# Patient Record
Sex: FEMALE | Race: BLACK OR AFRICAN AMERICAN | NOT HISPANIC OR LATINO | URBAN - METROPOLITAN AREA
[De-identification: names, ages, dates, MRNs, and addresses within clinical notes are randomized per-mention and may not be internally consistent; named-entity substitution may affect disease eponyms.]

---

## 2017-11-10 ENCOUNTER — EMERGENCY (EMERGENCY)
Facility: HOSPITAL | Age: 32
LOS: 1 days | Discharge: HOME | End: 2017-11-10
Attending: EMERGENCY MEDICINE
Payer: COMMERCIAL

## 2017-11-10 VITALS
DIASTOLIC BLOOD PRESSURE: 66 MMHG | TEMPERATURE: 98 F | OXYGEN SATURATION: 98 % | RESPIRATION RATE: 18 BRPM | HEART RATE: 72 BPM | SYSTOLIC BLOOD PRESSURE: 128 MMHG

## 2017-11-10 VITALS
OXYGEN SATURATION: 100 % | TEMPERATURE: 98 F | SYSTOLIC BLOOD PRESSURE: 146 MMHG | RESPIRATION RATE: 19 BRPM | DIASTOLIC BLOOD PRESSURE: 81 MMHG | HEART RATE: 89 BPM

## 2017-11-10 LAB
ALBUMIN SERPL ELPH-MCNC: 3.7 G/DL — SIGNIFICANT CHANGE UP (ref 3.5–5)
ALP SERPL-CCNC: 89 U/L — SIGNIFICANT CHANGE UP (ref 40–120)
ALT FLD-CCNC: 27 U/L DA — SIGNIFICANT CHANGE UP (ref 10–60)
ANION GAP SERPL CALC-SCNC: 7 MMOL/L — SIGNIFICANT CHANGE UP (ref 5–17)
AST SERPL-CCNC: 18 U/L — SIGNIFICANT CHANGE UP (ref 10–40)
BASOPHILS # BLD AUTO: 0.2 K/UL — SIGNIFICANT CHANGE UP (ref 0–0.2)
BASOPHILS NFR BLD AUTO: 2.2 % — HIGH (ref 0–2)
BILIRUB SERPL-MCNC: 0.2 MG/DL — SIGNIFICANT CHANGE UP (ref 0.2–1.2)
BUN SERPL-MCNC: 9 MG/DL — SIGNIFICANT CHANGE UP (ref 7–18)
CALCIUM SERPL-MCNC: 9.1 MG/DL — SIGNIFICANT CHANGE UP (ref 8.4–10.5)
CHLORIDE SERPL-SCNC: 101 MMOL/L — SIGNIFICANT CHANGE UP (ref 96–108)
CO2 SERPL-SCNC: 30 MMOL/L — SIGNIFICANT CHANGE UP (ref 22–31)
CREAT SERPL-MCNC: 0.74 MG/DL — SIGNIFICANT CHANGE UP (ref 0.5–1.3)
EOSINOPHIL # BLD AUTO: 0.3 K/UL — SIGNIFICANT CHANGE UP (ref 0–0.5)
EOSINOPHIL NFR BLD AUTO: 4.2 % — SIGNIFICANT CHANGE UP (ref 0–6)
GLUCOSE SERPL-MCNC: 96 MG/DL — SIGNIFICANT CHANGE UP (ref 70–99)
HCG UR QL: NEGATIVE — SIGNIFICANT CHANGE UP
HCT VFR BLD CALC: 37.9 % — SIGNIFICANT CHANGE UP (ref 34.5–45)
HGB BLD-MCNC: 12.1 G/DL — SIGNIFICANT CHANGE UP (ref 11.5–15.5)
LIDOCAIN IGE QN: 135 U/L — SIGNIFICANT CHANGE UP (ref 73–393)
LYMPHOCYTES # BLD AUTO: 3.1 K/UL — SIGNIFICANT CHANGE UP (ref 1–3.3)
LYMPHOCYTES # BLD AUTO: 40.4 % — SIGNIFICANT CHANGE UP (ref 13–44)
MCHC RBC-ENTMCNC: 28.5 PG — SIGNIFICANT CHANGE UP (ref 27–34)
MCHC RBC-ENTMCNC: 32 GM/DL — SIGNIFICANT CHANGE UP (ref 32–36)
MCV RBC AUTO: 88.9 FL — SIGNIFICANT CHANGE UP (ref 80–100)
MONOCYTES # BLD AUTO: 0.8 K/UL — SIGNIFICANT CHANGE UP (ref 0–0.9)
MONOCYTES NFR BLD AUTO: 10.4 % — SIGNIFICANT CHANGE UP (ref 2–14)
NEUTROPHILS # BLD AUTO: 3.3 K/UL — SIGNIFICANT CHANGE UP (ref 1.8–7.4)
NEUTROPHILS NFR BLD AUTO: 42.8 % — LOW (ref 43–77)
PLATELET # BLD AUTO: 311 K/UL — SIGNIFICANT CHANGE UP (ref 150–400)
POTASSIUM SERPL-MCNC: 4.1 MMOL/L — SIGNIFICANT CHANGE UP (ref 3.5–5.3)
POTASSIUM SERPL-SCNC: 4.1 MMOL/L — SIGNIFICANT CHANGE UP (ref 3.5–5.3)
PROT SERPL-MCNC: 8.2 G/DL — SIGNIFICANT CHANGE UP (ref 6–8.3)
RBC # BLD: 4.26 M/UL — SIGNIFICANT CHANGE UP (ref 3.8–5.2)
RBC # FLD: 13.1 % — SIGNIFICANT CHANGE UP (ref 10.3–14.5)
SODIUM SERPL-SCNC: 138 MMOL/L — SIGNIFICANT CHANGE UP (ref 135–145)
TROPONIN I SERPL-MCNC: <0.015 NG/ML — SIGNIFICANT CHANGE UP (ref 0–0.04)
WBC # BLD: 7.7 K/UL — SIGNIFICANT CHANGE UP (ref 3.8–10.5)
WBC # FLD AUTO: 7.7 K/UL — SIGNIFICANT CHANGE UP (ref 3.8–10.5)

## 2017-11-10 PROCEDURE — 99284 EMERGENCY DEPT VISIT MOD MDM: CPT | Mod: 25

## 2017-11-10 PROCEDURE — 99284 EMERGENCY DEPT VISIT MOD MDM: CPT

## 2017-11-10 PROCEDURE — 71020: CPT | Mod: 26

## 2017-11-10 PROCEDURE — 93010 ELECTROCARDIOGRAM REPORT: CPT

## 2017-11-10 PROCEDURE — 93005 ELECTROCARDIOGRAM TRACING: CPT

## 2017-11-10 PROCEDURE — 85027 COMPLETE CBC AUTOMATED: CPT

## 2017-11-10 PROCEDURE — 71046 X-RAY EXAM CHEST 2 VIEWS: CPT

## 2017-11-10 PROCEDURE — 96374 THER/PROPH/DIAG INJ IV PUSH: CPT

## 2017-11-10 PROCEDURE — 80053 COMPREHEN METABOLIC PANEL: CPT

## 2017-11-10 PROCEDURE — 81025 URINE PREGNANCY TEST: CPT

## 2017-11-10 PROCEDURE — 96375 TX/PRO/DX INJ NEW DRUG ADDON: CPT

## 2017-11-10 PROCEDURE — 83690 ASSAY OF LIPASE: CPT

## 2017-11-10 PROCEDURE — 84484 ASSAY OF TROPONIN QUANT: CPT

## 2017-11-10 RX ORDER — KETOROLAC TROMETHAMINE 30 MG/ML
30 SYRINGE (ML) INJECTION ONCE
Refills: 0 | Status: DISCONTINUED | OUTPATIENT
Start: 2017-11-10 | End: 2017-11-10

## 2017-11-10 RX ORDER — FAMOTIDINE 10 MG/ML
20 INJECTION INTRAVENOUS ONCE
Refills: 0 | Status: COMPLETED | OUTPATIENT
Start: 2017-11-10 | End: 2017-11-10

## 2017-11-10 RX ORDER — FAMOTIDINE 10 MG/ML
1 INJECTION INTRAVENOUS
Qty: 7 | Refills: 0
Start: 2017-11-10 | End: 2017-11-17

## 2017-11-10 RX ADMIN — Medication 30 MILLILITER(S): at 17:48

## 2017-11-10 RX ADMIN — FAMOTIDINE 20 MILLIGRAM(S): 10 INJECTION INTRAVENOUS at 17:49

## 2017-11-10 RX ADMIN — Medication 30 MILLIGRAM(S): at 18:43

## 2017-11-10 NOTE — ED PROVIDER NOTE - PROGRESS NOTE DETAILS
No improvement of chest pain with medications. Will give Toradol. Patient reports that has had a normal echo and stress test done in July 2017. Currently pain free. History and findings non-concerning for ACS. Patient already has GI and endoscopy follow up. Will give Pepcid Rx and patient can follow up on Monday with PMD. ECG NSR. CXR NAD. Pt is well appearing walking with steady gait, stable for discharge and follow up without fail with medical doctor. I had a detailed discussion with the patient and/or guardian regarding the historical points, exam findings, and any diagnostic results supporting the discharge diagnosis. Pt educated on care and need for follow up. Strict return instructions and red flag signs and symptoms discussed with patient. Questions answered. Pt shows understanding of discharge information and agrees to follow.

## 2017-11-10 NOTE — ED ADULT NURSE NOTE - OBJECTIVE STATEMENT
pt is here for chest pain.  Denied chest pain at this time but pain comes and goes 8/10.  pt calm at this time, denied sob, no N/V, denied headache.

## 2017-11-10 NOTE — ED PROVIDER NOTE - ATTENDING CONTRIBUTION TO CARE
Patient with atypical chest pain that is worse after eating food, had recent cardiac work up and stress test. Exam noted, RRR, lungs cta, no edema, no calf tenderness, pt is not tachy or hypoxic, abd soft, nt, no RUQ tenderness. Labs xray noted. Will dc with meds to follow up with pmd for further testing. Precautions reviewed.

## 2017-11-10 NOTE — ED PROVIDER NOTE - OBJECTIVE STATEMENT
33 y/o F pt w/ PMHx of HTN, DM presents to the ED c/o  continuos chest pain today. Pt reports that she feels pain when swallowing solids. Denies fever, chills, cough, palpitations, shortness of breath, birth control use, or any other complaints. NKDA. 31 y/o F pt w/ PMHx of HTN, DM, obesity, presents to the ED c/o continuous chest pain today. Pain is continuous with intermittent episodes of worsening pain lasting few seconds. No alleviating factors. Pt reports that she feels pain when swallowing solids. Denies fever, chills, cough, palpitations, shortness of breath, feeling of food getting stuck, dizziness, N/V, LE swelling/pain or any other complaints. NKDA.

## 2017-11-10 NOTE — ED PROVIDER NOTE - MEDICAL DECISION MAKING DETAILS
33 y/o F pt w/ chest pain. Well appearing, vitals normal and afebrile. Will obtain EKG, blood work, pain meds and reassess.

## 2017-11-11 ENCOUNTER — EMERGENCY (EMERGENCY)
Facility: HOSPITAL | Age: 32
LOS: 1 days | Discharge: HOME | End: 2017-11-11
Attending: EMERGENCY MEDICINE
Payer: COMMERCIAL

## 2017-11-11 VITALS
HEART RATE: 99 BPM | SYSTOLIC BLOOD PRESSURE: 125 MMHG | WEIGHT: 287.04 LBS | TEMPERATURE: 100 F | OXYGEN SATURATION: 98 % | RESPIRATION RATE: 16 BRPM | DIASTOLIC BLOOD PRESSURE: 72 MMHG

## 2017-11-11 DIAGNOSIS — R07.89 OTHER CHEST PAIN: ICD-10-CM

## 2017-11-11 DIAGNOSIS — E11.9 TYPE 2 DIABETES MELLITUS WITHOUT COMPLICATIONS: ICD-10-CM

## 2017-11-11 DIAGNOSIS — I10 ESSENTIAL (PRIMARY) HYPERTENSION: ICD-10-CM

## 2017-11-11 DIAGNOSIS — R07.9 CHEST PAIN, UNSPECIFIED: ICD-10-CM

## 2017-11-11 LAB
ALBUMIN SERPL ELPH-MCNC: 3.6 G/DL — SIGNIFICANT CHANGE UP (ref 3.5–5)
ALP SERPL-CCNC: 89 U/L — SIGNIFICANT CHANGE UP (ref 40–120)
ALT FLD-CCNC: 25 U/L DA — SIGNIFICANT CHANGE UP (ref 10–60)
ANION GAP SERPL CALC-SCNC: 7 MMOL/L — SIGNIFICANT CHANGE UP (ref 5–17)
APTT BLD: 32.3 SEC — SIGNIFICANT CHANGE UP (ref 27.5–37.4)
AST SERPL-CCNC: 14 U/L — SIGNIFICANT CHANGE UP (ref 10–40)
BASOPHILS # BLD AUTO: 0.1 K/UL — SIGNIFICANT CHANGE UP (ref 0–0.2)
BASOPHILS NFR BLD AUTO: 1.4 % — SIGNIFICANT CHANGE UP (ref 0–2)
BILIRUB SERPL-MCNC: 0.2 MG/DL — SIGNIFICANT CHANGE UP (ref 0.2–1.2)
BUN SERPL-MCNC: 10 MG/DL — SIGNIFICANT CHANGE UP (ref 7–18)
CALCIUM SERPL-MCNC: 9 MG/DL — SIGNIFICANT CHANGE UP (ref 8.4–10.5)
CHLORIDE SERPL-SCNC: 102 MMOL/L — SIGNIFICANT CHANGE UP (ref 96–108)
CO2 SERPL-SCNC: 30 MMOL/L — SIGNIFICANT CHANGE UP (ref 22–31)
CREAT SERPL-MCNC: 0.8 MG/DL — SIGNIFICANT CHANGE UP (ref 0.5–1.3)
EOSINOPHIL # BLD AUTO: 0.4 K/UL — SIGNIFICANT CHANGE UP (ref 0–0.5)
EOSINOPHIL NFR BLD AUTO: 4 % — SIGNIFICANT CHANGE UP (ref 0–6)
GLUCOSE SERPL-MCNC: 120 MG/DL — HIGH (ref 70–99)
HCT VFR BLD CALC: 38.1 % — SIGNIFICANT CHANGE UP (ref 34.5–45)
HGB BLD-MCNC: 12.1 G/DL — SIGNIFICANT CHANGE UP (ref 11.5–15.5)
INR BLD: 0.96 RATIO — SIGNIFICANT CHANGE UP (ref 0.88–1.16)
LIDOCAIN IGE QN: 85 U/L — SIGNIFICANT CHANGE UP (ref 73–393)
LYMPHOCYTES # BLD AUTO: 3.4 K/UL — HIGH (ref 1–3.3)
LYMPHOCYTES # BLD AUTO: 36.2 % — SIGNIFICANT CHANGE UP (ref 13–44)
MCHC RBC-ENTMCNC: 27.5 PG — SIGNIFICANT CHANGE UP (ref 27–34)
MCHC RBC-ENTMCNC: 31.6 GM/DL — LOW (ref 32–36)
MCV RBC AUTO: 87 FL — SIGNIFICANT CHANGE UP (ref 80–100)
MONOCYTES # BLD AUTO: 0.6 K/UL — SIGNIFICANT CHANGE UP (ref 0–0.9)
MONOCYTES NFR BLD AUTO: 6.7 % — SIGNIFICANT CHANGE UP (ref 2–14)
NEUTROPHILS # BLD AUTO: 4.9 K/UL — SIGNIFICANT CHANGE UP (ref 1.8–7.4)
NEUTROPHILS NFR BLD AUTO: 51.8 % — SIGNIFICANT CHANGE UP (ref 43–77)
PLATELET # BLD AUTO: 323 K/UL — SIGNIFICANT CHANGE UP (ref 150–400)
POTASSIUM SERPL-MCNC: 3.8 MMOL/L — SIGNIFICANT CHANGE UP (ref 3.5–5.3)
POTASSIUM SERPL-SCNC: 3.8 MMOL/L — SIGNIFICANT CHANGE UP (ref 3.5–5.3)
PROT SERPL-MCNC: 8.1 G/DL — SIGNIFICANT CHANGE UP (ref 6–8.3)
PROTHROM AB SERPL-ACNC: 10.4 SEC — SIGNIFICANT CHANGE UP (ref 9.8–12.7)
RBC # BLD: 4.38 M/UL — SIGNIFICANT CHANGE UP (ref 3.8–5.2)
RBC # FLD: 13 % — SIGNIFICANT CHANGE UP (ref 10.3–14.5)
SODIUM SERPL-SCNC: 139 MMOL/L — SIGNIFICANT CHANGE UP (ref 135–145)
TROPONIN I SERPL-MCNC: <0.015 NG/ML — SIGNIFICANT CHANGE UP (ref 0–0.04)
WBC # BLD: 9.4 K/UL — SIGNIFICANT CHANGE UP (ref 3.8–10.5)
WBC # FLD AUTO: 9.4 K/UL — SIGNIFICANT CHANGE UP (ref 3.8–10.5)

## 2017-11-11 PROCEDURE — 99285 EMERGENCY DEPT VISIT HI MDM: CPT

## 2017-11-11 RX ORDER — PANTOPRAZOLE SODIUM 20 MG/1
40 TABLET, DELAYED RELEASE ORAL ONCE
Refills: 0 | Status: COMPLETED | OUTPATIENT
Start: 2017-11-11 | End: 2017-11-11

## 2017-11-11 RX ORDER — LIDOCAINE 4 G/100G
10 CREAM TOPICAL ONCE
Refills: 0 | Status: COMPLETED | OUTPATIENT
Start: 2017-11-11 | End: 2017-11-11

## 2017-11-11 RX ADMIN — LIDOCAINE 10 MILLILITER(S): 4 CREAM TOPICAL at 22:12

## 2017-11-11 RX ADMIN — Medication 30 MILLILITER(S): at 22:12

## 2017-11-11 NOTE — ED PROVIDER NOTE - MEDICAL DECISION MAKING DETAILS
Labs, imaging, ekg, meds, observation. Labs, imaging, ekg, meds, observation.  ADDENDUM by Liza Johnson MD: Received signoff from Dr. Cameron. Prev healthy lady with chest burning associated with food, second presentation and uncomfortable, I was to ensure no pathology on CT and dispo per patient comfort. On my assessment pt is well appearing, reports unrelieved symptoms. Offered admission, which may include GI consult and scope, to which she initially agreed, but then later declined it and stated she would not stay as I was not able to ensure she would get further testing as an inpatient. Discharged with instructions for f/u.

## 2017-11-11 NOTE — ED PROVIDER NOTE - OBJECTIVE STATEMENT
32 year old female that has a history of diabetes and was seen in the ED yesterday returned because she feels pain in her mid chest that is worse when she swallows anything. She is able to swallow but the pain is so bad that she has been avoiding eating or drinking. No fever, no abd pain, no sob, no vomiting, no headache, no weakness, no fever, no chills, no history of GERD. She has had these symptoms for the last 2 days, She has not had steak or chicken before this pain began. 32 year old female that has a history of diabetes and was seen in the ED yesterday returned because she feels pain in her mid chest that is worse when she swallows anything. She is able to swallow but the pain is so bad that she has been avoiding eating or drinking. No fever, no abd pain, no sob, no vomiting, no headache, no weakness, no fever, no chills, no history of GERD. She has had these symptoms for the last 2 days, She has not had steak or chicken before this pain began. She had recent cardiac work up that included stress test.

## 2017-11-11 NOTE — ED PROVIDER NOTE - MUSCULOSKELETAL, MLM
Spine appears normal, range of motion is not limited, no muscle or joint tenderness, no edema, no calf tenderness

## 2017-11-12 VITALS
RESPIRATION RATE: 16 BRPM | HEART RATE: 89 BPM | DIASTOLIC BLOOD PRESSURE: 76 MMHG | TEMPERATURE: 98 F | OXYGEN SATURATION: 100 % | SYSTOLIC BLOOD PRESSURE: 133 MMHG

## 2017-11-12 PROCEDURE — 84484 ASSAY OF TROPONIN QUANT: CPT

## 2017-11-12 PROCEDURE — 96374 THER/PROPH/DIAG INJ IV PUSH: CPT

## 2017-11-12 PROCEDURE — 85379 FIBRIN DEGRADATION QUANT: CPT

## 2017-11-12 PROCEDURE — 99284 EMERGENCY DEPT VISIT MOD MDM: CPT | Mod: 25

## 2017-11-12 PROCEDURE — 71250 CT THORAX DX C-: CPT

## 2017-11-12 PROCEDURE — 80053 COMPREHEN METABOLIC PANEL: CPT

## 2017-11-12 PROCEDURE — 85027 COMPLETE CBC AUTOMATED: CPT

## 2017-11-12 PROCEDURE — 93005 ELECTROCARDIOGRAM TRACING: CPT

## 2017-11-12 PROCEDURE — 71250 CT THORAX DX C-: CPT | Mod: 26

## 2017-11-12 PROCEDURE — 83690 ASSAY OF LIPASE: CPT

## 2017-11-12 PROCEDURE — 85730 THROMBOPLASTIN TIME PARTIAL: CPT

## 2017-11-12 PROCEDURE — 85610 PROTHROMBIN TIME: CPT

## 2017-11-12 RX ADMIN — PANTOPRAZOLE SODIUM 40 MILLIGRAM(S): 20 TABLET, DELAYED RELEASE ORAL at 00:02

## 2019-11-05 ENCOUNTER — APPOINTMENT (OUTPATIENT)
Dept: ANTEPARTUM | Facility: CLINIC | Age: 34
End: 2019-11-05
Payer: COMMERCIAL

## 2019-11-05 ENCOUNTER — ASOB RESULT (OUTPATIENT)
Age: 34
End: 2019-11-05

## 2019-11-05 PROCEDURE — 76813 OB US NUCHAL MEAS 1 GEST: CPT

## 2019-11-05 PROCEDURE — 76801 OB US < 14 WKS SINGLE FETUS: CPT

## 2019-11-05 PROCEDURE — 36416 COLLJ CAPILLARY BLOOD SPEC: CPT

## 2019-11-15 ENCOUNTER — EMERGENCY (EMERGENCY)
Facility: HOSPITAL | Age: 34
LOS: 1 days | Discharge: HOME | End: 2019-11-15
Attending: STUDENT IN AN ORGANIZED HEALTH CARE EDUCATION/TRAINING PROGRAM
Payer: COMMERCIAL

## 2019-11-15 VITALS
DIASTOLIC BLOOD PRESSURE: 91 MMHG | TEMPERATURE: 98 F | HEART RATE: 79 BPM | SYSTOLIC BLOOD PRESSURE: 148 MMHG | RESPIRATION RATE: 16 BRPM

## 2019-11-15 LAB
ALBUMIN SERPL ELPH-MCNC: 4 G/DL — SIGNIFICANT CHANGE UP (ref 3.3–5)
ALP SERPL-CCNC: 52 U/L — SIGNIFICANT CHANGE UP (ref 40–120)
ALT FLD-CCNC: 8 U/L — SIGNIFICANT CHANGE UP (ref 4–33)
ANION GAP SERPL CALC-SCNC: 12 MMO/L — SIGNIFICANT CHANGE UP (ref 7–14)
APPEARANCE UR: CLEAR — SIGNIFICANT CHANGE UP
AST SERPL-CCNC: 11 U/L — SIGNIFICANT CHANGE UP (ref 4–32)
BILIRUB SERPL-MCNC: < 0.2 MG/DL — LOW (ref 0.2–1.2)
BILIRUB UR-MCNC: NEGATIVE — SIGNIFICANT CHANGE UP
BLOOD UR QL VISUAL: NEGATIVE — SIGNIFICANT CHANGE UP
BUN SERPL-MCNC: 10 MG/DL — SIGNIFICANT CHANGE UP (ref 7–23)
CALCIUM SERPL-MCNC: 9.7 MG/DL — SIGNIFICANT CHANGE UP (ref 8.4–10.5)
CHLORIDE SERPL-SCNC: 101 MMOL/L — SIGNIFICANT CHANGE UP (ref 98–107)
CO2 SERPL-SCNC: 23 MMOL/L — SIGNIFICANT CHANGE UP (ref 22–31)
COLOR SPEC: SIGNIFICANT CHANGE UP
CREAT SERPL-MCNC: 0.59 MG/DL — SIGNIFICANT CHANGE UP (ref 0.5–1.3)
GLUCOSE SERPL-MCNC: 82 MG/DL — SIGNIFICANT CHANGE UP (ref 70–99)
GLUCOSE UR-MCNC: NEGATIVE — SIGNIFICANT CHANGE UP
HCT VFR BLD CALC: 30.9 % — LOW (ref 34.5–45)
HGB BLD-MCNC: 10 G/DL — LOW (ref 11.5–15.5)
KETONES UR-MCNC: SIGNIFICANT CHANGE UP
LDH SERPL L TO P-CCNC: 125 U/L — LOW (ref 135–225)
LEUKOCYTE ESTERASE UR-ACNC: NEGATIVE — SIGNIFICANT CHANGE UP
MCHC RBC-ENTMCNC: 26.3 PG — LOW (ref 27–34)
MCHC RBC-ENTMCNC: 32.4 % — SIGNIFICANT CHANGE UP (ref 32–36)
MCV RBC AUTO: 81.3 FL — SIGNIFICANT CHANGE UP (ref 80–100)
NITRITE UR-MCNC: NEGATIVE — SIGNIFICANT CHANGE UP
NRBC # FLD: 0 K/UL — SIGNIFICANT CHANGE UP (ref 0–0)
PH UR: 6 — SIGNIFICANT CHANGE UP (ref 5–8)
PLATELET # BLD AUTO: 327 K/UL — SIGNIFICANT CHANGE UP (ref 150–400)
PMV BLD: 10.6 FL — SIGNIFICANT CHANGE UP (ref 7–13)
POTASSIUM SERPL-MCNC: 3.8 MMOL/L — SIGNIFICANT CHANGE UP (ref 3.5–5.3)
POTASSIUM SERPL-SCNC: 3.8 MMOL/L — SIGNIFICANT CHANGE UP (ref 3.5–5.3)
PROT SERPL-MCNC: 7.4 G/DL — SIGNIFICANT CHANGE UP (ref 6–8.3)
PROT UR-MCNC: NEGATIVE — SIGNIFICANT CHANGE UP
RBC # BLD: 3.8 M/UL — SIGNIFICANT CHANGE UP (ref 3.8–5.2)
RBC # FLD: 15 % — HIGH (ref 10.3–14.5)
SODIUM SERPL-SCNC: 136 MMOL/L — SIGNIFICANT CHANGE UP (ref 135–145)
SP GR SPEC: 1.02 — SIGNIFICANT CHANGE UP (ref 1–1.04)
URATE SERPL-MCNC: 5.2 MG/DL — SIGNIFICANT CHANGE UP (ref 2.5–7)
UROBILINOGEN FLD QL: NORMAL — SIGNIFICANT CHANGE UP
WBC # BLD: 8.28 K/UL — SIGNIFICANT CHANGE UP (ref 3.8–10.5)
WBC # FLD AUTO: 8.28 K/UL — SIGNIFICANT CHANGE UP (ref 3.8–10.5)

## 2019-11-15 PROCEDURE — 99283 EMERGENCY DEPT VISIT LOW MDM: CPT

## 2019-11-15 RX ORDER — HYDRALAZINE HCL 50 MG
1 TABLET ORAL
Qty: 30 | Refills: 0
Start: 2019-11-15 | End: 2019-12-14

## 2019-11-15 NOTE — ED PROVIDER NOTE - PROGRESS NOTE DETAILS
The patient has no active symptoms, normal LDH/Uric acid, no signs of proteinuria or UTI, patient explained results, unwilling to wait for form GYN consult evaluation. The patient wishes to f/u with her Primary OB, patient understands risks and benefits of foregoing formal consult evaluation. Patient to be prescribed hydralazine for HTN with plan for f/u.

## 2019-11-15 NOTE — ED PROVIDER NOTE - CLINICAL SUMMARY MEDICAL DECISION MAKING FREE TEXT BOX
33 y/o F 14 weeks pregnant p/w asymptomatic HTN. Since pt is less than 20 weeks gestation, it is very unlikely that she has preeclampsia. Will review basic labs, including LDH, uric acid and UA and will dispo likely w/ OB consult. 33 y/o F 14 weeks pregnant p/w asymptomatic HTN. Since pt is less than 20 weeks gestation, it is very unlikely that she has preeclampsia. Will review basic labs, including LDH, uric acid and UA and will dispo likely w/ OB consult.  Patient wishes to not wait for OB consult, no current symptoms at this time, labs wnl, low concern for pre eclampsia given lab results and lack of exam findings, patient to f/u with her Primary and understands strict return precautions.

## 2019-11-15 NOTE — ED ADULT NURSE NOTE - OBJECTIVE STATEMENT
33 yo female, , 13 weeks pregnant, c/o HTN. Pt was hypertensive prior to pregnancy , but meds switched once pt became pregnant. pt is taking Labetolol 1600mg daily with no improvement in BP. pt reports getting headaches when pressure goes too high. pt denies blurry vision. pt reports tingling to arms over last few days. pt reports SOB since pregnancy started. pt denies fevers, active chest pain, n/v/d. 20g iv insert to right ac. labs sent.

## 2019-11-15 NOTE — ED ADULT TRIAGE NOTE - CHIEF COMPLAINT QUOTE
13 weeks pregnant pt with hx of HTN states that her BP has been high since she found out that she was pregnant despite Labetalol TID.  Pt offers no complaints at this time, denies HA, CP or SOB.  PMH HTN, sickle cell trait, asthma

## 2019-11-15 NOTE — ED PROVIDER NOTE - PATIENT PORTAL LINK FT
You can access the FollowMyHealth Patient Portal offered by [facility] by registering at the following website: [email or web address]. By joining [employer]’s FollowMyHealth portal, you will also be able to view your health information using other applications (apps) compatible with our system.

## 2019-11-15 NOTE — ED PROVIDER NOTE - OBJECTIVE STATEMENT
33 y/o F, 14 weeks pregnant, p/w c/o high blood pressure. Pt was sent in by her primary OB doctor (Dr. Rachel) for concerns of high blood pressure. Pt states normally HTN is controlled but since pregnancy, has been transitioned to Labetalol and reports taking up to 1600mg daily. Pt's PMD sent her in for evaluation and workup. Denies any headache, nausea, vomiting, abdominal pain or vaginal bleeding. Pt has had a confirmatory IUP.

## 2019-11-17 LAB
BACTERIA UR CULT: SIGNIFICANT CHANGE UP
SPECIMEN SOURCE: SIGNIFICANT CHANGE UP

## 2019-11-22 ENCOUNTER — ASOB RESULT (OUTPATIENT)
Age: 34
End: 2019-11-22

## 2019-11-22 ENCOUNTER — APPOINTMENT (OUTPATIENT)
Dept: MATERNAL FETAL MEDICINE | Facility: CLINIC | Age: 34
End: 2019-11-22
Payer: COMMERCIAL

## 2019-11-22 VITALS — BODY MASS INDEX: 42.49 KG/M2 | WEIGHT: 255 LBS | HEIGHT: 65 IN

## 2019-11-22 DIAGNOSIS — Z87.42 PERSONAL HISTORY OF OTHER DISEASES OF THE FEMALE GENITAL TRACT: ICD-10-CM

## 2019-11-22 DIAGNOSIS — Z86.39 PERSONAL HISTORY OF OTHER ENDOCRINE, NUTRITIONAL AND METABOLIC DISEASE: ICD-10-CM

## 2019-11-22 PROCEDURE — G0108 DIAB MANAGE TRN  PER INDIV: CPT

## 2019-12-03 ENCOUNTER — APPOINTMENT (OUTPATIENT)
Dept: MATERNAL FETAL MEDICINE | Facility: CLINIC | Age: 34
End: 2019-12-03
Payer: COMMERCIAL

## 2019-12-03 ENCOUNTER — APPOINTMENT (OUTPATIENT)
Dept: ANTEPARTUM | Facility: CLINIC | Age: 34
End: 2019-12-03

## 2019-12-03 ENCOUNTER — ASOB RESULT (OUTPATIENT)
Age: 34
End: 2019-12-03

## 2019-12-03 VITALS
RESPIRATION RATE: 16 BRPM | BODY MASS INDEX: 42.9 KG/M2 | SYSTOLIC BLOOD PRESSURE: 120 MMHG | OXYGEN SATURATION: 98 % | HEIGHT: 65 IN | HEART RATE: 77 BPM | WEIGHT: 257.5 LBS | DIASTOLIC BLOOD PRESSURE: 70 MMHG

## 2019-12-03 DIAGNOSIS — N62 HYPERTROPHY OF BREAST: ICD-10-CM

## 2019-12-03 PROCEDURE — 99242 OFF/OP CONSLTJ NEW/EST SF 20: CPT | Mod: 25

## 2019-12-03 RX ORDER — METFORMIN HYDROCHLORIDE 500 MG/1
500 TABLET, COATED ORAL
Refills: 0 | Status: ACTIVE | COMMUNITY

## 2019-12-03 RX ORDER — FAMOTIDINE 20 MG/1
20 TABLET, FILM COATED ORAL
Refills: 0 | Status: ACTIVE | COMMUNITY

## 2019-12-03 RX ORDER — VITAMIN C, CALCIUM, IRON, VITAMIN D3, VITAMIN E, VITAMIN B1, VITAMIN B2, VITAMIN B3, VITAMIN B6, FOLIC ACID, IODINE, ZINC, COPPER, DOCUSATE SODIUM, DOCOSAHEXAENOIC ACID (DHA) 27-1-50 MG
KIT ORAL
Refills: 0 | Status: ACTIVE | COMMUNITY

## 2019-12-03 RX ORDER — NIFEDIPINE 30 MG/1
30 TABLET, FILM COATED, EXTENDED RELEASE ORAL
Refills: 0 | Status: ACTIVE | COMMUNITY

## 2019-12-03 RX ORDER — LABETALOL HYDROCHLORIDE 300 MG/1
TABLET, FILM COATED ORAL
Refills: 0 | Status: ACTIVE | COMMUNITY

## 2019-12-03 RX ORDER — FOLIC ACID 1 MG/1
1 TABLET ORAL
Refills: 0 | Status: ACTIVE | COMMUNITY

## 2019-12-03 RX ORDER — ASPIRIN 81 MG
81 TABLET, DELAYED RELEASE (ENTERIC COATED) ORAL
Refills: 0 | Status: ACTIVE | COMMUNITY

## 2019-12-07 ENCOUNTER — APPOINTMENT (OUTPATIENT)
Dept: ANTEPARTUM | Facility: CLINIC | Age: 34
End: 2019-12-07

## 2019-12-07 ENCOUNTER — APPOINTMENT (OUTPATIENT)
Dept: MATERNAL FETAL MEDICINE | Facility: CLINIC | Age: 34
End: 2019-12-07
Payer: COMMERCIAL

## 2019-12-07 ENCOUNTER — ASOB RESULT (OUTPATIENT)
Age: 34
End: 2019-12-07

## 2019-12-07 PROCEDURE — 99214 OFFICE O/P EST MOD 30 MIN: CPT

## 2019-12-16 ENCOUNTER — OUTPATIENT (OUTPATIENT)
Dept: INPATIENT UNIT | Facility: HOSPITAL | Age: 34
LOS: 1 days | Discharge: HOME | End: 2019-12-16
Payer: COMMERCIAL

## 2019-12-16 VITALS
DIASTOLIC BLOOD PRESSURE: 82 MMHG | SYSTOLIC BLOOD PRESSURE: 157 MMHG | TEMPERATURE: 98 F | HEART RATE: 88 BPM | RESPIRATION RATE: 16 BRPM

## 2019-12-16 VITALS — HEART RATE: 75 BPM | SYSTOLIC BLOOD PRESSURE: 118 MMHG | DIASTOLIC BLOOD PRESSURE: 58 MMHG

## 2019-12-16 DIAGNOSIS — Z3A.00 WEEKS OF GESTATION OF PREGNANCY NOT SPECIFIED: ICD-10-CM

## 2019-12-16 DIAGNOSIS — O26.899 OTHER SPECIFIED PREGNANCY RELATED CONDITIONS, UNSPECIFIED TRIMESTER: ICD-10-CM

## 2019-12-16 DIAGNOSIS — Z98.84 BARIATRIC SURGERY STATUS: Chronic | ICD-10-CM

## 2019-12-16 DIAGNOSIS — Z98.890 OTHER SPECIFIED POSTPROCEDURAL STATES: Chronic | ICD-10-CM

## 2019-12-16 LAB
APPEARANCE UR: CLEAR — SIGNIFICANT CHANGE UP
BILIRUB UR-MCNC: NEGATIVE — SIGNIFICANT CHANGE UP
BLOOD UR QL VISUAL: NEGATIVE — SIGNIFICANT CHANGE UP
COLOR SPEC: YELLOW — SIGNIFICANT CHANGE UP
GLUCOSE UR-MCNC: NEGATIVE — SIGNIFICANT CHANGE UP
KETONES UR-MCNC: NEGATIVE — SIGNIFICANT CHANGE UP
LEUKOCYTE ESTERASE UR-ACNC: NEGATIVE — SIGNIFICANT CHANGE UP
NITRITE UR-MCNC: NEGATIVE — SIGNIFICANT CHANGE UP
PH UR: 6 — SIGNIFICANT CHANGE UP (ref 5–8)
PROT UR-MCNC: NEGATIVE — SIGNIFICANT CHANGE UP
SP GR SPEC: 1.02 — SIGNIFICANT CHANGE UP (ref 1–1.04)
UROBILINOGEN FLD QL: NORMAL — SIGNIFICANT CHANGE UP

## 2019-12-16 PROCEDURE — 99215 OFFICE O/P EST HI 40 MIN: CPT | Mod: 25

## 2019-12-16 PROCEDURE — 76817 TRANSVAGINAL US OBSTETRIC: CPT | Mod: 26

## 2019-12-16 PROCEDURE — 76815 OB US LIMITED FETUS(S): CPT | Mod: 26

## 2019-12-16 NOTE — OB PROVIDER TRIAGE NOTE - HISTORY OF PRESENT ILLNESS
33yo -American female  @ 18.2 wks SLIUP with hx PCOS and CHTN here co two gushes of clear fluid at 1:00pm this afternoon after she got out of the shower. Pt denies VB/ctx's. No fever/chills/nausea/vomitting.    Pmhx-CHTN x several yrs; DMII that reversed after weight loss  Pshx/Hosp-bariatric sx/gastric sleeve; R rotator cuffs sx  Meds-PNV; metformin; labetalol 400mg BID; Procardia 30mg QD  NKDA  Past pb-denies  Gyn-hx abnl PAP with colpo        -PCOS

## 2019-12-16 NOTE — OB PROVIDER TRIAGE NOTE - NS_PLACENTALOCAL_OBGYN_ALL_OB
Avoid NSAIDs like Naproxen (Aleeve, Naprosyn) and Ibuprofen (Motrin, Advil)    Chronic Kidney Disease   AMBULATORY CARE:   Chronic kidney disease (CKD)  is the gradual and permanent loss of kidney function  Normally, the kidneys remove fluid, chemicals, and waste from your blood  These wastes are turned into urine by your kidneys  CKD may worsen over time and lead to kidney failure  Common symptoms include the following:   · Changes in how often you need to urinate    · Swelling in your arms, legs, or feet    · Shortness of breath    · Fatigue or weakness    · Bad or bitter taste in your mouth    · Nausea, vomiting, or loss of appetite  Seek care immediately if:   · You are confused and very drowsy  · You have a seizure  · You have shortness of breath  Contact your healthcare provider if:   · You suddenly gain or lose more weight than your healthcare provider has told you is okay  · You have itchy skin or a rash  · You urinate more or less than you normally do  · You have blood in your urine  · You have nausea and repeated vomiting  · You have fatigue or muscle weakness  · You have hiccups that will not stop  · You have questions or concerns about your condition or care  Treatment for CKD:  Medicines may be given to decrease blood pressure and get rid of extra fluid  You may also receive medicine to manage health conditions that may occur with CKD  Dialysis is a treatment to remove chemicals and waste from your blood when your kidneys can no longer do this  Surgery may be needed to create an arteriovenous fistula (AVF) in your arm or insert a catheter into your abdomen so that you can receive dialysis  A kidney transplant may be done if your CKD becomes severe  Manage CKD:   · Maintain a healthy weight  Ask your healthcare provider how much you should weigh  Ask him to help you create a weight loss plan if you are overweight      · Exercise 30 to 60 minutes a day, 4 to 7 times a week, or as directed  Ask about the best exercise plan for you  Regular exercise can help you manage CKD, high blood pressure, and diabetes  · Follow your healthcare provider's advice about what to eat and drink  He may tell you to eat food low in sodium (salt), potassium, phosphorus, or protein  You may need to see a dietitian if you need help planning meals  Ask how much liquid to drink each day and which liquids are best for you  · Limit alcohol  Ask how much alcohol is safe for you to drink  A drink of alcohol is 12 ounces of beer, 5 ounces of wine, or 1½ ounces of liquor  · Do not smoke  Nicotine and other chemicals in cigarettes and cigars can cause lung and kidney damage  Ask your healthcare provider for information if you currently smoke and need help to quit  E-cigarettes or smokeless tobacco still contain nicotine  Talk to your healthcare provider before you use these products  · Ask your healthcare provider if you need vaccines  Infections such as pneumonia, influenza, and hepatitis can be more harmful or more likely to occur in a person who has CKD  Vaccines reduce your risk of infection with these viruses  Follow up with your healthcare provider as directed:  Write down your questions so you remember to ask them during your visits  © 2017 [facility] Information is for End User's use only and may not be sold, redistributed or otherwise used for commercial purposes  All illustrations and images included in CareNotes® are the copyrighted property of [employer] , [employer]  or José Miguel Clay  The above information is an  only  It is not intended as medical advice for individual conditions or treatments  Talk to your doctor, nurse or pharmacist before following any medical regimen to see if it is safe and effective for you      DASH Eating Plan   AMBULATORY CARE:   The DASH (Dietary Approaches to Stop Hypertension) Eating Plan  is designed to help prevent or lower high blood pressure  It can also help to lower LDL (bad) cholesterol and decrease your risk for heart disease  The plan is low in sodium, sugar, unhealthy fats, and total fat  It is high in potassium, calcium, magnesium, and fiber  These nutrients are added when you eat more fruits, vegetables, and whole grains  Your sodium limit each day: Your dietitian will tell you how much sodium is safe for you to have each day  People with high blood pressure should have no more than 1,500 to 2,300 mg of sodium in a day  A teaspoon (tsp) of salt has 2,300 mg of sodium  This may seem like a difficult goal, but small changes to the foods you eat can make a big difference  Your healthcare provider or dietitian can help you create a meal plan that follows your sodium limit  How to limit sodium:   · Read food labels  Food labels can help you choose foods that are low in sodium  The amount of sodium is listed in milligrams (mg)  The % Daily Value (DV) column tells you how much of your daily needs are met by 1 serving of the food for each nutrient listed  Choose foods that have less than 5% of the DV of sodium  These foods are considered low in sodium  Foods that have 20% or more of the DV of sodium are considered high in sodium  Avoid foods that have more than 300 mg of sodium in each serving  Choose foods that say low-sodium, reduced-sodium, or no salt added on the food label  · Avoid salt  Do not salt food at the table, and add very little salt to foods during cooking  Use herbs and spices, such as onions, garlic, and salt-free seasonings to add flavor to foods  Try lemon or lime juice or vinegar to give foods a tart flavor  Use hot peppers or a small amount of hot pepper sauce to add a spicy flavor to foods  · Ask about salt substitutes  Ask your healthcare provider if you may use salt substitutes   Some salt substitutes have ingredients that can be harmful if you have certain health conditions  · Choose foods carefully at restaurants  Meals from restaurants, especially fast food restaurants, are often high in sodium  Some restaurants have nutrition information that tells you the amount of sodium in their foods  Ask to have your food prepared with less, or no salt  What you need to know about fats:   · Include healthy fats  Examples are unsaturated fats and omega-3 fatty acids  Unsaturated fats are found in soybean, canola, olive, or sunflower oil, and liquid and soft tub margarines  Omega-3 fatty acids are found in fatty fish, such as salmon, tuna, mackerel, and sardines  It is also found in flaxseed oil and ground flaxseed  · Avoid unhealthy fats  Do not eat unhealthy fats, such as saturated fats and trans fats  Saturated fats are found in foods that contain fat from animals  Examples are fatty meats, whole milk, butter, cream, and other dairy foods  It is also found in shortening, stick margarine, palm oil, and coconut oil  Trans fats are found in fried foods, crackers, chips, and baked goods made with margarine or shortening  Foods to include: With the DASH eating plan, you need to eat a certain number of servings from each food group  This will help you get enough of certain nutrients and limit others  The amount of servings you should eat depends on how many calories you need  Your dietitian can tell you how many calories you need  The number of servings listed next to the food groups below are for people who need about 2,000 calories each day    · Grains:  6 to 8 servings (3 of these servings should be whole-grain foods)    ¨ 1 slice of whole-grain bread     ¨ 1 ounce of dry cereal    ¨ ½ cup of cooked cereal, pasta, or brown rice    · Vegetables and fruits:  4 to 5 servings of fruits and 4 to 5 servings of vegetables    ¨ 1 medium fruit    ¨ ½ cup of frozen, canned (no added salt), or chopped fresh vegetables     ¨ ½ cup of fresh, frozen, dried, or canned fruit (canned in light syrup or fruit juice)    ¨ ½ cup of vegetable or fruit juice    · Dairy:  2 to 3 servings    ¨ 1 cup of nonfat (skim) or 1% milk    ¨ 1½ ounces of fat-free or low-fat cheese    ¨ 6 ounces of nonfat or low-fat yogurt    · Lean meat, poultry, and fish:  6 ounces or less    [employer] (chicken, turkey) with no skin    ¨ Fish (especially fatty fish, such as salmon, fresh tuna, or mackerel)    ¨ Lean beef and pork (loin, round, extra lean hamburger)    ¨ Egg whites and egg substitutes    · Nuts, seeds, and legumes:  4 to 5 servings each week    ¨ ½ cup of cooked beans and peas    ¨ 1½ ounces of unsalted nuts    ¨ 2 tablespoons of peanut butter or seeds    · Sweets and added sugars:  5 or less each week    ¨ 1 tablespoon of sugar, jelly, or jam    ¨ ½ cup of sorbet or gelatin    ¨ 1 cup of lemonade    · Fats:  2 to 3 servings each week    ¨ 1 teaspoon of soft margarine or vegetable oil    ¨ 1 tablespoon of mayonnaise    ¨ 2 tablespoons of salad dressing  Foods to avoid:   · Grains:      [employer], such as doughnuts, pastries, cookies, and biscuits (high in fat and sugar)    ¨ Mixes for cornbread and biscuits, packaged foods, such as bread stuffing, rice and pasta mixes, macaroni and cheese, and instant cereals (high in sodium)    · Fruits and vegetables:      ¨ Regular, canned vegetables (high in sodium)    ¨ Sauerkraut, pickled vegetables, and other foods prepared in brine (high in sodium)    ¨ Fried vegetables or vegetables in butter or high-fat sauces    ¨ Fruit in cream or butter sauce (high in fat)    · Dairy:      ¨ Whole milk, 2% milk, and cream (high in fat)    ¨ Regular cheese and processed cheese (high in fat and sodium)    · Meats and protein foods:      ¨ Smoked or cured meat, such as corned beef, velez, ham, hot dogs, and sausage (high in fat and sodium)    ¨ Canned beans and canned meats or spreads, such as potted meats, sardines, anchovies, and imitation seafood (high in sodium)    ¨ Deli or lunch meats, such as bologna, ham, turkey, and roast beef (high in sodium)    ¨ High-fat meat (T-bone steak, regular hamburger, and ribs)    ¨ Whole eggs and egg yolks (high in fat)    · Other:      ¨ Seasonings made with salt, such as garlic salt, celery salt, onion salt, seasoned salt, meat tenderizers, and monosodium glutamate (MSG)    ¨ Miso soup and canned or dried soup mixes (high in sodium)    ¨ Regular soy sauce, barbecue sauce, teriyaki sauce, steak sauce, Worcestershire sauce, and most flavored vinegars (high in sodium)    ¨ Regular condiments, such as mustard, ketchup, and salad dressings (high in sodium)    ¨ Gravy and sauces, such as Jeison or cheese sauces (high in sodium and fat)    ¨ Drinks high in sugar, such as soda or fruit drinks    [employer] foods, such as salted chips, popcorn, pretzels, pork rinds, salted crackers, and salted nuts    ¨ Frozen foods, such as dinners, entrees, vegetables with sauces, and breaded meats (high in sodium)  Other guidelines to follow:   · Maintain a healthy weight  Your risk for heart disease is higher if you are overweight  Your healthcare provider may suggest that you lose weight if you are overweight  You can lose weight by eating fewer calories and foods that have added sugars and fat  The DASH meal plan can help you do this  Decrease calories by eating smaller portions at each meal and fewer snacks  Ask your healthcare provider for more information about how to lose weight  · Exercise regularly  Regular exercise can help you reach or maintain a healthy weight  Regular exercise can also help decrease your blood pressure and improve your cholesterol levels  Get 30 minutes or more of moderate exercise each day of the week  To lose weight, get at least 60 minutes of exercise  Talk to your healthcare provider about the best exercise program for you  · Limit alcohol  Women should limit alcohol to 1 drink a day  Men should limit alcohol to 2 drinks a day   A drink of alcohol is 12 ounces of beer, 5 ounces of wine, or 1½ ounces of liquor  © 2017 [address] [employer] Information is for End User's use only and may not be sold, redistributed or otherwise used for commercial purposes  All illustrations and images included in CareNotes® are the copyrighted property of [employer] [facility]  [employer]  or José Miguel [employer]  The above information is an  only  It is not intended as medical advice for individual conditions or treatments  Talk to your doctor, nurse or pharmacist before following any medical regimen to see if it is safe and effective for you  Anterior

## 2019-12-16 NOTE — OB PROVIDER TRIAGE NOTE - NSOBPROVIDERNOTE_OBGYN_ALL_OB_FT
33yo -American female  @ 18.2 wks SLIUP with hx PCOS and CHTN here for rule out ROM  -SSE negative for rupture  -TVS is @ 4.51cm  -UA sent  -initial BP was elevated before exam; now is normalized after positive outcome 35yo -American female  @ 18.2 wks SLIUP with hx PCOS and CHTN here for rule out ROM  -SSE negative for rupture  -TVS is @ 4.51cm  -UA sent and is negative  -initial BP was elevated before exam; now is normalized after positive outcome  -pt was oscar Wilson. Pt was dc home with instructions to keep her Level II anatomy scan for .

## 2019-12-16 NOTE — OB PROVIDER TRIAGE NOTE - NSHPPHYSICALEXAM_GEN_ALL_CORE
Gen: A&O x 3; NAD; obese  Vitals: BP-115/67; P-77; T-36.9    Abd exam-soft and nontender; gravid  TAS-vtx; AAFI; SLIUP; anterior placenta  SSE-os appears closed; scant leukorrhea in posterior vault; Negative pooling/negative nitrazine/ negative ferning  TVS-4.51 cm with no funnel or dynamic change

## 2019-12-16 NOTE — OB RN TRIAGE NOTE - PSH
H/O bariatric surgery  2018 gastric sleeve 2018  History of rotator cuff surgery  right shoulder 2019

## 2019-12-16 NOTE — OB RN TRIAGE NOTE - PMH
Asthma  only related to URI  Hypertension  controlled by medication  PCOS (polycystic ovarian syndrome)

## 2019-12-17 ENCOUNTER — APPOINTMENT (OUTPATIENT)
Dept: MATERNAL FETAL MEDICINE | Facility: CLINIC | Age: 34
End: 2019-12-17

## 2019-12-23 PROBLEM — E28.2 POLYCYSTIC OVARIAN SYNDROME: Chronic | Status: ACTIVE | Noted: 2019-12-16

## 2019-12-23 PROBLEM — I10 ESSENTIAL (PRIMARY) HYPERTENSION: Chronic | Status: ACTIVE | Noted: 2019-12-16

## 2019-12-23 PROBLEM — J45.909 UNSPECIFIED ASTHMA, UNCOMPLICATED: Chronic | Status: ACTIVE | Noted: 2019-12-16

## 2019-12-24 ENCOUNTER — ASOB RESULT (OUTPATIENT)
Age: 34
End: 2019-12-24

## 2019-12-24 ENCOUNTER — APPOINTMENT (OUTPATIENT)
Dept: ANTEPARTUM | Facility: CLINIC | Age: 34
End: 2019-12-24
Payer: COMMERCIAL

## 2019-12-24 PROCEDURE — 76811 OB US DETAILED SNGL FETUS: CPT

## 2019-12-31 ENCOUNTER — ASOB RESULT (OUTPATIENT)
Age: 34
End: 2019-12-31

## 2019-12-31 ENCOUNTER — APPOINTMENT (OUTPATIENT)
Dept: MATERNAL FETAL MEDICINE | Facility: CLINIC | Age: 34
End: 2019-12-31

## 2019-12-31 ENCOUNTER — APPOINTMENT (OUTPATIENT)
Dept: ANTEPARTUM | Facility: CLINIC | Age: 34
End: 2019-12-31

## 2019-12-31 ENCOUNTER — APPOINTMENT (OUTPATIENT)
Dept: MATERNAL FETAL MEDICINE | Facility: CLINIC | Age: 34
End: 2019-12-31
Payer: COMMERCIAL

## 2019-12-31 VITALS
HEIGHT: 65 IN | WEIGHT: 266 LBS | DIASTOLIC BLOOD PRESSURE: 82 MMHG | HEART RATE: 84 BPM | SYSTOLIC BLOOD PRESSURE: 136 MMHG | BODY MASS INDEX: 44.32 KG/M2

## 2019-12-31 DIAGNOSIS — Z3A.16 16 WEEKS GESTATION OF PREGNANCY: ICD-10-CM

## 2019-12-31 PROCEDURE — 99214 OFFICE O/P EST MOD 30 MIN: CPT

## 2019-12-31 RX ORDER — LABETALOL HYDROCHLORIDE 200 MG/1
200 TABLET, FILM COATED ORAL
Qty: 60 | Refills: 2 | Status: ACTIVE | COMMUNITY
Start: 2019-12-31 | End: 1900-01-01

## 2019-12-31 RX ORDER — LABETALOL HYDROCHLORIDE 300 MG/1
300 TABLET, FILM COATED ORAL
Qty: 60 | Refills: 2 | Status: ACTIVE | COMMUNITY
Start: 2019-12-31 | End: 1900-01-01

## 2020-01-08 ENCOUNTER — APPOINTMENT (OUTPATIENT)
Dept: PEDIATRIC CARDIOLOGY | Facility: CLINIC | Age: 35
End: 2020-01-08
Payer: COMMERCIAL

## 2020-01-08 PROCEDURE — 76827 ECHO EXAM OF FETAL HEART: CPT

## 2020-01-08 PROCEDURE — 93325 DOPPLER ECHO COLOR FLOW MAPG: CPT | Mod: 59

## 2020-01-08 PROCEDURE — 76825 ECHO EXAM OF FETAL HEART: CPT

## 2020-01-08 PROCEDURE — 99241 OFFICE CONSULTATION NEW/ESTAB PATIENT 15 MIN: CPT | Mod: 25

## 2020-01-10 ENCOUNTER — APPOINTMENT (OUTPATIENT)
Dept: MATERNAL FETAL MEDICINE | Facility: CLINIC | Age: 35
End: 2020-01-10

## 2020-01-20 ENCOUNTER — APPOINTMENT (OUTPATIENT)
Dept: ANTEPARTUM | Facility: CLINIC | Age: 35
End: 2020-01-20
Payer: COMMERCIAL

## 2020-01-20 ENCOUNTER — APPOINTMENT (OUTPATIENT)
Dept: MATERNAL FETAL MEDICINE | Facility: CLINIC | Age: 35
End: 2020-01-20
Payer: COMMERCIAL

## 2020-01-20 ENCOUNTER — ASOB RESULT (OUTPATIENT)
Age: 35
End: 2020-01-20

## 2020-01-20 VITALS
HEIGHT: 65 IN | HEART RATE: 88 BPM | WEIGHT: 271 LBS | DIASTOLIC BLOOD PRESSURE: 78 MMHG | BODY MASS INDEX: 45.15 KG/M2 | SYSTOLIC BLOOD PRESSURE: 156 MMHG

## 2020-01-20 DIAGNOSIS — D57.3 SICKLE-CELL TRAIT: ICD-10-CM

## 2020-01-20 LAB
BASOPHILS # BLD AUTO: 0.02 K/UL
BASOPHILS NFR BLD AUTO: 0.2 %
EOSINOPHIL # BLD AUTO: 0.32 K/UL
EOSINOPHIL NFR BLD AUTO: 3.7 %
HCT VFR BLD CALC: 29.8 %
HGB BLD-MCNC: 9.8 G/DL
IMM GRANULOCYTES NFR BLD AUTO: 0.2 %
LYMPHOCYTES # BLD AUTO: 1.98 K/UL
LYMPHOCYTES NFR BLD AUTO: 22.8 %
MAN DIFF?: NORMAL
MCHC RBC-ENTMCNC: 27.7 PG
MCHC RBC-ENTMCNC: 32.9 GM/DL
MCV RBC AUTO: 84.2 FL
MONOCYTES # BLD AUTO: 0.75 K/UL
MONOCYTES NFR BLD AUTO: 8.6 %
NEUTROPHILS # BLD AUTO: 5.6 K/UL
NEUTROPHILS NFR BLD AUTO: 64.5 %
PLATELET # BLD AUTO: 331 K/UL
RBC # BLD: 3.54 M/UL
RBC # FLD: 13.4 %
WBC # FLD AUTO: 8.69 K/UL

## 2020-01-20 PROCEDURE — 76816 OB US FOLLOW-UP PER FETUS: CPT

## 2020-01-20 PROCEDURE — 99243 OFF/OP CNSLTJ NEW/EST LOW 30: CPT

## 2020-01-20 NOTE — DISCUSSION/SUMMARY
[FreeTextEntry1] : Please see the previous consultation for the patient's medical and obstetrical history. Patient is currently 23 weeks pregnant with type 2 diabetes, chronic hypertension, status post gastric sleeve surgery, Sickle Cell trait and maternal obesity. She is on metformin 500 mg twice a day, labetalol 500 mg twice a day, 30 mg of nifedipine twice a day and baby aspirin one tablet twice a day.\par \par The patient did not bring her diabetic flow sheets. She states that her fasting blood sugars are mostly below 90 and that the majority of her postprandial blood sugars were also found to be within normal limits. A growth scan was performed today which reveals a single viable intrauterine gestation with estimated fetal weight 1 lb. 7 oz. consistent with the 74th percentile. Vertex presentation with an anterior placenta is seen and a normal amniotic fluid index is noted. Vital signs today reveal a blood pressure 156/78, she weighs 271 pounds which is a 5 pound weight gain from the evaluation done on . This is consistent with a BMI of 45.10KG.\par \par Management of her pregnancy was discussed. At this time the patient will continue on her current medications and current dosages. A hemoglobin A1c was drawn today. In addition a CBC with platelet count and ferritin level were also drawn today. The patient is sickle cell trait and the father of this pregnancy is not available for testing. She will return in one month for a growth scan and follow up maternal fetal medicine consultation. She was advised should her fasting blood sugars be elevated and/or her postprandial blood sugars be elevated prior to consultation to call the office to discuss management. In addition she was advised to obtain a large cuff for home blood pressure monitoring. She was reassured that she is doing well with this pregnancy and at this time the pregnancy is going along appropriately. The possible increased risk for superimposed preeclampsia was discussed. In addition the possibility of a  delivery was also discussed. At this time she was reassured. All the above was discussed with the patient and all her questions were answered.\par \par I spent a total of 40 minutes of which greater than 50% was counseling and coordinating care.\par \par Recommendations;\par \par #1. Metformin 500 mg p.o. twice a day.\par #2. Labetalol 500 mg p.o. twice a day.\par #3. Nifedipine 30 mg p.o. twice a day.\par #4. Baby aspirin 81 mg p.o. twice a day.\par #5. Growth scan and maternal fetal medicine consultation one month is recommended.\par #6. Hemoglobin A1c drawn today.\par #7. CBC with platelet count and ferritin level drawn today.

## 2020-01-21 ENCOUNTER — APPOINTMENT (OUTPATIENT)
Dept: ANTEPARTUM | Facility: CLINIC | Age: 35
End: 2020-01-21

## 2020-01-21 LAB
ESTIMATED AVERAGE GLUCOSE: 105 MG/DL
FERRITIN SERPL-MCNC: 15 NG/ML
HBA1C MFR BLD HPLC: 5.3 %

## 2020-02-19 ENCOUNTER — APPOINTMENT (OUTPATIENT)
Dept: ANTEPARTUM | Facility: CLINIC | Age: 35
End: 2020-02-19

## 2020-02-19 ENCOUNTER — APPOINTMENT (OUTPATIENT)
Dept: MATERNAL FETAL MEDICINE | Facility: CLINIC | Age: 35
End: 2020-02-19
Payer: COMMERCIAL

## 2020-02-19 ENCOUNTER — ASOB RESULT (OUTPATIENT)
Age: 35
End: 2020-02-19

## 2020-02-19 ENCOUNTER — APPOINTMENT (OUTPATIENT)
Dept: ANTEPARTUM | Facility: CLINIC | Age: 35
End: 2020-02-19
Payer: COMMERCIAL

## 2020-02-19 ENCOUNTER — APPOINTMENT (OUTPATIENT)
Dept: MATERNAL FETAL MEDICINE | Facility: CLINIC | Age: 35
End: 2020-02-19

## 2020-02-19 VITALS
HEART RATE: 88 BPM | WEIGHT: 278.19 LBS | HEIGHT: 65 IN | RESPIRATION RATE: 18 BRPM | DIASTOLIC BLOOD PRESSURE: 70 MMHG | BODY MASS INDEX: 46.35 KG/M2 | SYSTOLIC BLOOD PRESSURE: 125 MMHG | OXYGEN SATURATION: 99 %

## 2020-02-19 PROCEDURE — 76816 OB US FOLLOW-UP PER FETUS: CPT

## 2020-02-19 PROCEDURE — 76820 UMBILICAL ARTERY ECHO: CPT

## 2020-02-19 PROCEDURE — 99244 OFF/OP CNSLTJ NEW/EST MOD 40: CPT

## 2020-02-19 RX ORDER — GLUC/MSM/COLGN2/HYAL/ANTIARTH3 375-375-20
TABLET ORAL
Refills: 0 | Status: ACTIVE | COMMUNITY

## 2020-02-19 NOTE — DATA REVIEWED
[FreeTextEntry1] : Sonogram today shows good interval growth at the 53rd percentile. \par \par Essence has been having difficulties getting supplies due to insurance issues. She seems to have it all sorted out, and understands the diet. She reports feeling well with no complaints of hypo of hyperglycemia. \par \par She is testing fasting vaslues only, and they range from .  She is tolerating the Metformin well\par \par Her BPs at home remain well controlled as well, and she denies any headache or visual changes.

## 2020-02-19 NOTE — DISCUSSION/SUMMARY
[FreeTextEntry1] : Continue metformin as ordered\par \par Continue Labetolol and procardia as ordered.\par \par Weekly fetal testing\par \par Followup appointment with medical nutrition in 2 weeks. \par \par Repeat sonogram for growth and MFM consultation in 4 weeks.

## 2020-02-19 NOTE — HISTORY OF PRESENT ILLNESS
[FreeTextEntry1] : Essence presents today for interval growth scan. She is taking Labetolol and Procardia for her hypertension, as well as Metformin for her diabetes.

## 2020-02-26 ENCOUNTER — APPOINTMENT (OUTPATIENT)
Dept: ANTEPARTUM | Facility: CLINIC | Age: 35
End: 2020-02-26
Payer: COMMERCIAL

## 2020-02-26 ENCOUNTER — ASOB RESULT (OUTPATIENT)
Age: 35
End: 2020-02-26

## 2020-02-26 PROCEDURE — 76818 FETAL BIOPHYS PROFILE W/NST: CPT

## 2020-03-04 ENCOUNTER — ASOB RESULT (OUTPATIENT)
Age: 35
End: 2020-03-04

## 2020-03-04 ENCOUNTER — APPOINTMENT (OUTPATIENT)
Dept: ANTEPARTUM | Facility: CLINIC | Age: 35
End: 2020-03-04
Payer: COMMERCIAL

## 2020-03-04 ENCOUNTER — APPOINTMENT (OUTPATIENT)
Dept: MATERNAL FETAL MEDICINE | Facility: CLINIC | Age: 35
End: 2020-03-04
Payer: COMMERCIAL

## 2020-03-04 VITALS — HEIGHT: 65 IN | BODY MASS INDEX: 46.65 KG/M2 | WEIGHT: 280 LBS

## 2020-03-04 DIAGNOSIS — Z90.3 ACQUIRED ABSENCE OF STOMACH [PART OF]: ICD-10-CM

## 2020-03-04 DIAGNOSIS — O24.419 GESTATIONAL DIABETES MELLITUS IN PREGNANCY, UNSPECIFIED CONTROL: ICD-10-CM

## 2020-03-04 PROCEDURE — 93976 VASCULAR STUDY: CPT

## 2020-03-04 PROCEDURE — 76818 FETAL BIOPHYS PROFILE W/NST: CPT

## 2020-03-04 PROCEDURE — 76820 UMBILICAL ARTERY ECHO: CPT

## 2020-03-04 PROCEDURE — G0108 DIAB MANAGE TRN  PER INDIV: CPT

## 2020-03-11 ENCOUNTER — APPOINTMENT (OUTPATIENT)
Dept: ANTEPARTUM | Facility: CLINIC | Age: 35
End: 2020-03-11
Payer: COMMERCIAL

## 2020-03-11 ENCOUNTER — ASOB RESULT (OUTPATIENT)
Age: 35
End: 2020-03-11

## 2020-03-11 PROCEDURE — 93976 VASCULAR STUDY: CPT

## 2020-03-11 PROCEDURE — 76818 FETAL BIOPHYS PROFILE W/NST: CPT

## 2020-03-11 PROCEDURE — 76820 UMBILICAL ARTERY ECHO: CPT

## 2020-03-18 ENCOUNTER — APPOINTMENT (OUTPATIENT)
Dept: ANTEPARTUM | Facility: CLINIC | Age: 35
End: 2020-03-18
Payer: COMMERCIAL

## 2020-03-18 ENCOUNTER — ASOB RESULT (OUTPATIENT)
Age: 35
End: 2020-03-18

## 2020-03-18 ENCOUNTER — APPOINTMENT (OUTPATIENT)
Dept: MATERNAL FETAL MEDICINE | Facility: CLINIC | Age: 35
End: 2020-03-18
Payer: COMMERCIAL

## 2020-03-18 VITALS
DIASTOLIC BLOOD PRESSURE: 70 MMHG | HEIGHT: 65 IN | HEART RATE: 90 BPM | BODY MASS INDEX: 46.48 KG/M2 | SYSTOLIC BLOOD PRESSURE: 126 MMHG | WEIGHT: 279 LBS

## 2020-03-18 DIAGNOSIS — O10.919 UNSPECIFIED PRE-EXISTING HYPERTENSION COMPLICATING PREGNANCY, UNSPECIFIED TRIMESTER: ICD-10-CM

## 2020-03-18 DIAGNOSIS — O24.819: ICD-10-CM

## 2020-03-18 DIAGNOSIS — E66.01 OBESITY COMPLICATING PREGNANCY, SECOND TRIMESTER: ICD-10-CM

## 2020-03-18 DIAGNOSIS — O09.512 SUPERVISION OF ELDERLY PRIMIGRAVIDA, SECOND TRIMESTER: ICD-10-CM

## 2020-03-18 DIAGNOSIS — Z86.79 PERSONAL HISTORY OF OTHER DISEASES OF THE CIRCULATORY SYSTEM: ICD-10-CM

## 2020-03-18 DIAGNOSIS — O99.212 OBESITY COMPLICATING PREGNANCY, SECOND TRIMESTER: ICD-10-CM

## 2020-03-18 PROCEDURE — 76820 UMBILICAL ARTERY ECHO: CPT

## 2020-03-18 PROCEDURE — 76816 OB US FOLLOW-UP PER FETUS: CPT

## 2020-03-18 PROCEDURE — 99213 OFFICE O/P EST LOW 20 MIN: CPT | Mod: 25

## 2020-03-18 PROCEDURE — 76818 FETAL BIOPHYS PROFILE W/NST: CPT

## 2020-03-18 NOTE — REASON FOR VISIT
[Nursing Assessment] : Nursing Assessment [FreeTextEntry2] : F/U [facility] to discuss T2DM and CHTN in pregnancy. BS attached to chart. Pt is currently taking Metformin 500mg PO BID.     Pt is not testing consistently and reports she has been "stress eating." Importance of testing and ADA diet encouraged. Support offered. Pt is currently taking Labetalol 500mg PO AM/HS 400mg Midday. And Procardia 30mg Daily. Pt denies HA/Visual disturbances/RUQ pain. No edema noted. Pt reports good FM. Pt reports occ lower abdominal pains. Pt denies ctx/cramping/vaginal bleeding/leaking.

## 2020-03-18 NOTE — DISCUSSION/SUMMARY
[FreeTextEntry1] : Continue weekly testing\par \par Continue current Metformin\par \par Continue current Labetolol/Procardia\par \par Medical nutrition consult is scheduled in 2 weeks. \par \par Delivery at 37-38 weeks.

## 2020-03-24 ENCOUNTER — EMERGENCY (EMERGENCY)
Facility: HOSPITAL | Age: 35
LOS: 1 days | Discharge: HOME | End: 2020-03-24
Attending: EMERGENCY MEDICINE | Admitting: EMERGENCY MEDICINE
Payer: COMMERCIAL

## 2020-03-24 VITALS
RESPIRATION RATE: 16 BRPM | HEART RATE: 117 BPM | SYSTOLIC BLOOD PRESSURE: 150 MMHG | TEMPERATURE: 99 F | DIASTOLIC BLOOD PRESSURE: 90 MMHG | OXYGEN SATURATION: 100 %

## 2020-03-24 VITALS
OXYGEN SATURATION: 100 % | DIASTOLIC BLOOD PRESSURE: 84 MMHG | TEMPERATURE: 99 F | HEART RATE: 97 BPM | SYSTOLIC BLOOD PRESSURE: 147 MMHG | RESPIRATION RATE: 18 BRPM

## 2020-03-24 DIAGNOSIS — R06.02 SHORTNESS OF BREATH: ICD-10-CM

## 2020-03-24 DIAGNOSIS — Z98.84 BARIATRIC SURGERY STATUS: Chronic | ICD-10-CM

## 2020-03-24 DIAGNOSIS — Z98.890 OTHER SPECIFIED POSTPROCEDURAL STATES: Chronic | ICD-10-CM

## 2020-03-24 LAB
ALBUMIN SERPL ELPH-MCNC: 3.4 G/DL — SIGNIFICANT CHANGE UP (ref 3.3–5)
ALP SERPL-CCNC: 114 U/L — SIGNIFICANT CHANGE UP (ref 40–120)
ALT FLD-CCNC: 7 U/L — SIGNIFICANT CHANGE UP (ref 4–33)
ANION GAP SERPL CALC-SCNC: 12 MMO/L — SIGNIFICANT CHANGE UP (ref 7–14)
APPEARANCE UR: CLEAR — SIGNIFICANT CHANGE UP
AST SERPL-CCNC: 12 U/L — SIGNIFICANT CHANGE UP (ref 4–32)
B PERT DNA SPEC QL NAA+PROBE: NOT DETECTED — SIGNIFICANT CHANGE UP
BASOPHILS # BLD AUTO: 0.02 K/UL — SIGNIFICANT CHANGE UP (ref 0–0.2)
BASOPHILS NFR BLD AUTO: 0.3 % — SIGNIFICANT CHANGE UP (ref 0–2)
BILIRUB SERPL-MCNC: < 0.2 MG/DL — LOW (ref 0.2–1.2)
BILIRUB UR-MCNC: NEGATIVE — SIGNIFICANT CHANGE UP
BLOOD UR QL VISUAL: NEGATIVE — SIGNIFICANT CHANGE UP
BUN SERPL-MCNC: 4 MG/DL — LOW (ref 7–23)
C PNEUM DNA SPEC QL NAA+PROBE: NOT DETECTED — SIGNIFICANT CHANGE UP
CALCIUM SERPL-MCNC: 9.8 MG/DL — SIGNIFICANT CHANGE UP (ref 8.4–10.5)
CHLORIDE SERPL-SCNC: 102 MMOL/L — SIGNIFICANT CHANGE UP (ref 98–107)
CO2 SERPL-SCNC: 20 MMOL/L — LOW (ref 22–31)
COLOR SPEC: SIGNIFICANT CHANGE UP
CREAT SERPL-MCNC: 0.61 MG/DL — SIGNIFICANT CHANGE UP (ref 0.5–1.3)
EOSINOPHIL # BLD AUTO: 0.09 K/UL — SIGNIFICANT CHANGE UP (ref 0–0.5)
EOSINOPHIL NFR BLD AUTO: 1.3 % — SIGNIFICANT CHANGE UP (ref 0–6)
FLUAV H1 2009 PAND RNA SPEC QL NAA+PROBE: NOT DETECTED — SIGNIFICANT CHANGE UP
FLUAV H1 RNA SPEC QL NAA+PROBE: NOT DETECTED — SIGNIFICANT CHANGE UP
FLUAV H3 RNA SPEC QL NAA+PROBE: NOT DETECTED — SIGNIFICANT CHANGE UP
FLUAV SUBTYP SPEC NAA+PROBE: NOT DETECTED — SIGNIFICANT CHANGE UP
FLUBV RNA SPEC QL NAA+PROBE: NOT DETECTED — SIGNIFICANT CHANGE UP
GLUCOSE SERPL-MCNC: 111 MG/DL — HIGH (ref 70–99)
GLUCOSE UR-MCNC: NEGATIVE — SIGNIFICANT CHANGE UP
HADV DNA SPEC QL NAA+PROBE: NOT DETECTED — SIGNIFICANT CHANGE UP
HCOV PNL SPEC NAA+PROBE: SIGNIFICANT CHANGE UP
HCT VFR BLD CALC: 30.6 % — LOW (ref 34.5–45)
HGB BLD-MCNC: 10.3 G/DL — LOW (ref 11.5–15.5)
HMPV RNA SPEC QL NAA+PROBE: NOT DETECTED — SIGNIFICANT CHANGE UP
HPIV1 RNA SPEC QL NAA+PROBE: NOT DETECTED — SIGNIFICANT CHANGE UP
HPIV2 RNA SPEC QL NAA+PROBE: NOT DETECTED — SIGNIFICANT CHANGE UP
HPIV3 RNA SPEC QL NAA+PROBE: NOT DETECTED — SIGNIFICANT CHANGE UP
HPIV4 RNA SPEC QL NAA+PROBE: NOT DETECTED — SIGNIFICANT CHANGE UP
IMM GRANULOCYTES NFR BLD AUTO: 0.4 % — SIGNIFICANT CHANGE UP (ref 0–1.5)
KETONES UR-MCNC: NEGATIVE — SIGNIFICANT CHANGE UP
LEUKOCYTE ESTERASE UR-ACNC: NEGATIVE — SIGNIFICANT CHANGE UP
LYMPHOCYTES # BLD AUTO: 0.65 K/UL — LOW (ref 1–3.3)
LYMPHOCYTES # BLD AUTO: 9.2 % — LOW (ref 13–44)
MCHC RBC-ENTMCNC: 28 PG — SIGNIFICANT CHANGE UP (ref 27–34)
MCHC RBC-ENTMCNC: 33.7 % — SIGNIFICANT CHANGE UP (ref 32–36)
MCV RBC AUTO: 83.2 FL — SIGNIFICANT CHANGE UP (ref 80–100)
MONOCYTES # BLD AUTO: 1.23 K/UL — HIGH (ref 0–0.9)
MONOCYTES NFR BLD AUTO: 17.4 % — HIGH (ref 2–14)
NEUTROPHILS # BLD AUTO: 5.05 K/UL — SIGNIFICANT CHANGE UP (ref 1.8–7.4)
NEUTROPHILS NFR BLD AUTO: 71.4 % — SIGNIFICANT CHANGE UP (ref 43–77)
NITRITE UR-MCNC: NEGATIVE — SIGNIFICANT CHANGE UP
NRBC # FLD: 0 K/UL — SIGNIFICANT CHANGE UP (ref 0–0)
PH UR: 7 — SIGNIFICANT CHANGE UP (ref 5–8)
PLATELET # BLD AUTO: 236 K/UL — SIGNIFICANT CHANGE UP (ref 150–400)
PMV BLD: 11 FL — SIGNIFICANT CHANGE UP (ref 7–13)
POTASSIUM SERPL-MCNC: 4 MMOL/L — SIGNIFICANT CHANGE UP (ref 3.5–5.3)
POTASSIUM SERPL-SCNC: 4 MMOL/L — SIGNIFICANT CHANGE UP (ref 3.5–5.3)
PROT SERPL-MCNC: 7.1 G/DL — SIGNIFICANT CHANGE UP (ref 6–8.3)
PROT UR-MCNC: NEGATIVE — SIGNIFICANT CHANGE UP
RBC # BLD: 3.68 M/UL — LOW (ref 3.8–5.2)
RBC # FLD: 14 % — SIGNIFICANT CHANGE UP (ref 10.3–14.5)
RSV RNA SPEC QL NAA+PROBE: NOT DETECTED — SIGNIFICANT CHANGE UP
RV+EV RNA SPEC QL NAA+PROBE: NOT DETECTED — SIGNIFICANT CHANGE UP
SODIUM SERPL-SCNC: 134 MMOL/L — LOW (ref 135–145)
SP GR SPEC: 1.01 — SIGNIFICANT CHANGE UP (ref 1–1.04)
UROBILINOGEN FLD QL: NORMAL — SIGNIFICANT CHANGE UP
WBC # BLD: 7.07 K/UL — SIGNIFICANT CHANGE UP (ref 3.8–10.5)
WBC # FLD AUTO: 7.07 K/UL — SIGNIFICANT CHANGE UP (ref 3.8–10.5)

## 2020-03-24 PROCEDURE — 99284 EMERGENCY DEPT VISIT MOD MDM: CPT

## 2020-03-24 PROCEDURE — 71045 X-RAY EXAM CHEST 1 VIEW: CPT | Mod: 26

## 2020-03-24 RX ORDER — ALBUTEROL 90 UG/1
4 AEROSOL, METERED ORAL ONCE
Refills: 0 | Status: COMPLETED | OUTPATIENT
Start: 2020-03-24 | End: 2020-03-24

## 2020-03-24 RX ORDER — ACETAMINOPHEN 500 MG
975 TABLET ORAL ONCE
Refills: 0 | Status: COMPLETED | OUTPATIENT
Start: 2020-03-24 | End: 2020-03-24

## 2020-03-24 RX ORDER — SODIUM CHLORIDE 9 MG/ML
1000 INJECTION INTRAMUSCULAR; INTRAVENOUS; SUBCUTANEOUS ONCE
Refills: 0 | Status: COMPLETED | OUTPATIENT
Start: 2020-03-24 | End: 2020-03-24

## 2020-03-24 RX ADMIN — SODIUM CHLORIDE 1000 MILLILITER(S): 9 INJECTION INTRAMUSCULAR; INTRAVENOUS; SUBCUTANEOUS at 06:00

## 2020-03-24 RX ADMIN — Medication 975 MILLIGRAM(S): at 05:10

## 2020-03-24 RX ADMIN — Medication 975 MILLIGRAM(S): at 06:00

## 2020-03-24 RX ADMIN — ALBUTEROL 4 PUFF(S): 90 AEROSOL, METERED ORAL at 05:10

## 2020-03-24 RX ADMIN — SODIUM CHLORIDE 1000 MILLILITER(S): 9 INJECTION INTRAMUSCULAR; INTRAVENOUS; SUBCUTANEOUS at 05:10

## 2020-03-24 NOTE — ED PROVIDER NOTE - CAS EDP CONSULT REGARDING 1

## 2020-03-24 NOTE — ED ADULT NURSE NOTE - HOW OFTEN DO YOU HAVE A DRINK CONTAINING ALCOHOL?
Test Reason : 

Blood Pressure : ***/*** mmHG

Vent. Rate : 076 BPM     Atrial Rate : 076 BPM

   P-R Int : 160 ms          QRS Dur : 094 ms

    QT Int : 376 ms       P-R-T Axes : 081 082 073 degrees

   QTc Int : 423 ms

 

SINUS RHYTHM WITH PREMATURE ATRIAL COMPLEXES

OTHERWISE NORMAL ECG

NO PREVIOUS ECGS AVAILABLE

Confirmed by ANTOINETTE CASTRO MD ([id number]) on 4/8/2017 4:13:12 PM

 

Referred By:             Confirmed By:ANTOINETTE CASTRO MD Never

## 2020-03-24 NOTE — CONSULT NOTE ADULT - SUBJECTIVE AND OBJECTIVE BOX
R2 GYN ED CONSULT NOTE    SUBJECTIVE:    33yo  @ 32wks pmh of cHTN, Asthsma, GDMA1, PCOS presenting w/ fever at home to 101.2, SOB, cough, and abd pain. The cough started a few days ago. Fever happened last night. She gets epigastric/RUQ/LLQ pain for a few seconds and then it goes away. she said she doesn't have difficulty breathing at rest just when ambulating. Endorse good FM. Denies LOF/Ctx/VB. she is otherwise asymptomatic.    Pt denies fever, chills, nausea, vomiting, diarrhea, headache, constipation, dizzyness, syncope, chest pain, palpitations, dysuria, urgency, frequency, vaginal bleeding/discharge/odor/pain/itching, breast lumps/bumps, dyspareunia.    Primary OB/GYN: Scarantino  OBH: G1  GYNH: denies hx of fibroids, abnl paps, sti  +PCOS  PMSH: Asthma, cHTN, gastric sleeve, rotator cuff surgery  MEDS: labetalol, Procardia, Albuterol PRN  ALL: nkda  SOCH: denies t/e/d; safe at home  FAMH: denies fam hx of breast/uterine/ovarian/colon cancer  ROS: negative except per hpi    OBJECTIVE:    VITAL SIGNS:  Vital Signs Last 24 Hrs  T(C): 37.3 (24 Mar 2020 05:27), Max: 37.3 (24 Mar 2020 03:17)  T(F): 99.2 (24 Mar 2020 05:27), Max: 99.2 (24 Mar 2020 05:27)  HR: 97 (24 Mar 2020 05:27) (97 - 117)  BP: 147/84 (24 Mar 2020 05:27) (147/84 - 150/90)  BP(mean): --  RR: 18 (24 Mar 2020 05:27) (16 - 18)  SpO2: 100% (24 Mar 2020 05:27) (100% - 100%)    CAPILLARY BLOOD GLUCOSE      PHYSICAL EXAM:  GEN: NAD, A&Ox3  ABD: soft, NT, gravid uterus, no guarding/rebound  EXT: WWP, no edema/TTP    LABS:                        10.3   7.07  )-----------( 236      ( 24 Mar 2020 05:14 )             30.6   baso 0.3    eos 1.3    imm gran 0.4    lymph 9.2    mono 17.4   poly 71.4           134<L>  |  102  |  4<L>  ----------------------------<  111<H>  4.0   |  20<L>  |  0.61    Ca    9.8      24 Mar 2020 05:14    TPro  7.1  /  Alb  3.4  /  TBili  < 0.2<L>  /  DBili  x   /  AST  12  /  ALT  7   /  AlkPhos  114        Urinalysis Basic - ( 24 Mar 2020 05:14 )    Color: LIGHT YELLOW / Appearance: CLEAR / S.013 / pH: 7.0  Gluc: NEGATIVE / Ketone: NEGATIVE  / Bili: NEGATIVE / Urobili: NORMAL   Blood: NEGATIVE / Protein: NEGATIVE / Nitrite: NEGATIVE   Leuk Esterase: NEGATIVE / RBC: x / WBC x   Sq Epi: x / Non Sq Epi: x / Bacteria: x

## 2020-03-24 NOTE — CONSULT NOTE ADULT - ASSESSMENT
ASSESSMENT: Pt is a 33YO  @ 32wks presenting w/ fever, SOB, cough, vague abdominal pain. Vitals have been stable. patient satting well on RA. HELLP Labs wnl. Pain is likely from reflux vs. gravid uterus. No obstetrical complaints at this time.

## 2020-03-24 NOTE — ED PROVIDER NOTE - PHYSICAL EXAMINATION
General: A&Ox3, well nourished, no acute distress  HENT: NC/AT. Posterior oropharynx clear. Patent airway  Eyes: PERRL, EOMI  CV: RRR, no m/r/g. 2+ peripheral pulses. Extremities are warm and well perfused.  Respiratory: CTAB no w/r/r. No respiratory distress.  Abdominal: gravid just 5 cm below the costal margin, minimal RLQ ttp, no rebound, guarding, or rigidity. No CVA ttp.  Neuro: No focal deficits  Skin: no rashes  Psych: normal mood and affect

## 2020-03-24 NOTE — ED PROVIDER NOTE - NSFOLLOWUPINSTRUCTIONS_ED_ALL_ED_FT
Hospital Discharge Follow-Up 
  
  
[facility] and discharged on 1/3/19 for CAD, left heart cath, ESRD, DM. The patient attended an appointment with Dr. Bryon Suresh on 1/9/19. No medication changes noted. Patient was referred for a sleep study. Goals Addressed This Visit's Progress  Attends follow-up appointments as directed. On track Plan:  Monitor for attendance at apts and assist as needed to schedule 1/9/19-attended apt with Dr. Bryon Suresh Please call your obgyn for follow up. Take a copy of your results with you. Please return if symptoms worsen or if you have nausea/vomiting/ difficulty staying hydrated or worsening right sided pain/ vaginal bleeding/ or any new concerns.     You can take acetaminophen (aka tylenol, 650mg every 4-6 hours) for pain. Do not exceed 4000 mg acetaminophen (tylenol) in a 24 hour period. Be aware if any of your other medications contain acetaminophen so as not to exceed the maximum daily dose.    We are recommending a 14 day quarantine. At the end of that time, you must meet all of the following criteria:  - You have been without fever for at least 24 hours, without the use of medication to suppress fever  - Your other symptoms are improving  - If you tested positive for COVID-19, it has been at least 7 days since that test was performed    If any of the above are not true for you after the 14 day period (e.g. continued fevers) call your primary care doctor or the emergency department for additional guidance.    You should restrict the number of people in your home except for those who must absolutely be present (caregivers, children, spouse, etc). Pay special attention to avoid contact with those over the age of 65 or with significant chronic medical conditions (see below).    Illness severe enough to warrant to hospitalization can happen sometimes, even in people who were initially well enough to monitor themselves at home. You should return to the hospital immediately if you experience worsening shortness of breath, confusion, or any other new or concerning symptoms. If you are over the age of 65, or have chronic medical conditions such as chronic heart disease, asthma, COPD, kidney or liver problems, cancer, or take any drugs that suppress the immune system, you should have a low threshold for returning to the emergency department.    Acute Abdominal Pain    WHAT YOU NEED TO KNOW:    The cause of your abdominal pain may not be found. If a cause is found, treatment will depend on what the cause is.     DISCHARGE INSTRUCTIONS:    Return to the emergency department if:     You vomit blood or cannot stop vomiting.      You have blood in your bowel movement or it looks like tar.       You have bleeding from your rectum.       Your abdomen is larger than usual, more painful, and hard.       You have severe pain in your abdomen.       You stop passing gas and having bowel movements.       You feel weak, dizzy, or faint.    Contact your healthcare provider if:     You have a fever.      You have new signs and symptoms.      Your symptoms do not get better with treatment.       You have questions or concerns about your condition or care.    Medicines may be given to decrease pain, treat an infection, and manage your symptoms. Take your medicine as directed. Call your healthcare provider if you think your medicine is not helping or if you have side effects. Tell him if you are allergic to any medicine. Keep a list of the medicines, vitamins, and herbs you take. Include the amounts, and when and why you take them. Bring the list or the pill bottles to follow-up visits. Carry your medicine list with you in case of an emergency.    Manage your symptoms:     Apply heat on your abdomen for 20 to 30 minutes every 2 hours for as many days as directed. Heat helps decrease pain and muscle spasms.       Manage your stress. Stress may cause abdominal pain. Your healthcare provider may recommend relaxation techniques and deep breathing exercises to help decrease your stress. Your healthcare provider may recommend you talk to someone about your stress or anxiety, such as a counselor or a trusted friend. Get plenty of sleep and exercise regularly.       Limit or do not drink alcohol. Alcohol can make your abdominal pain worse. Ask your healthcare provider if it is safe for you to drink alcohol. Also ask how much is safe for you to drink.       Do not smoke. Nicotine and other chemicals in cigarettes can damage your esophagus and stomach. Ask your healthcare provider for information if you currently smoke and need help to quit. E-cigarettes or smokeless tobacco still contain nicotine. Talk to your healthcare provider before you use these products.     Make changes to the food you eat as directed: Do not eat foods that cause abdominal pain or other symptoms. Eat small meals more often.     Eat more high-fiber foods if you are constipated. High-fiber foods include fruits, vegetables, whole-grain foods, and legumes.       Do not eat foods that cause gas if you have bloating. Examples include broccoli, cabbage, and cauliflower. Do not drink soda or carbonated drinks, because these may also cause gas.       Do not eat foods or drinks that contain sorbitol or fructose if you have diarrhea and bloating. Some examples are fruit juices, candy, jelly, and sugar-free gum.       Do not eat high-fat foods, such as fried foods, cheeseburgers, hot dogs, and desserts.      Limit or do not drink caffeine. Caffeine may make symptoms, such as heart burn or nausea, worse.       Drink plenty of liquids to prevent dehydration from diarrhea or vomiting. Ask your healthcare provider how much liquid to drink each day and which liquids are best for you.     Follow up with your healthcare provider as directed: Write down your questions so you remember to ask them during your visits.

## 2020-03-24 NOTE — ED PROVIDER NOTE - ADDITIONAL NOTES AND INSTRUCTIONS:
Patient has been evaluated by a physician and determined that she should be self quarantined and abstain from work for 14 days from when respiratory symptoms and fever have improved. At minimum 2 weeks.

## 2020-03-24 NOTE — ED ADULT TRIAGE NOTE - CHIEF COMPLAINT QUOTE
alert 32 weeks pregnant  generalized sharp abd pain also c/o fever wheezing and back pain  hx gestational diabetes HTN asthma   spoke to Yee in L and D to be seen in ED

## 2020-03-24 NOTE — CONSULT NOTE ADULT - PROBLEM SELECTOR RECOMMENDATION 9
RECOMMENDATIONS:  -Hypertension in pregnancy labs  -CXR  -monitor vitals  -patient will need NSt  -If NST reactive, the patient is cleared for discharge from OB perspective.  -f/u as scheduled w/ OBGYN    Paul Rivas PGY-2 d/w Dr. Wilson

## 2020-03-24 NOTE — ED PROVIDER NOTE - PATIENT PORTAL LINK FT
You can access the FollowMyHealth Patient Portal offered by [facility] by registering at the following website: [email or web address]. By joining [employer]’s FollowMyHealth portal, you will also be able to view your health information using other applications (apps) compatible with our system.

## 2020-03-24 NOTE — ED PROVIDER NOTE - PROGRESS NOTE DETAILS
Jonathan Weil, PGY3 - bedside ultrasound: FHR 140s, positive movement. Davis PGY3: pt without ttp, abd soft, appropriately gravid, states her pain is pressurelike/achey only at the right lower quadrant when her bladder fills up, otherwise has also b/l low back pain, suspect possible pressure from the uterus on the bladder, will check a bedside post void, ucx pending in lab, obgyn coming down to eval her. Davis PGY3: pt well appearing, still no reproducible pain, does not need any meds at this time, eval'd by obgyn, cleared for dc as NST normal, pt states she has hx of htn, noted bp high and given pre-eclampsia precautions, all ppwkl and work note provided, pt endorsed understanding of return precautions

## 2020-03-24 NOTE — ED PROVIDER NOTE - CLINICAL SUMMARY MEDICAL DECISION MAKING FREE TEXT BOX
Jonathan Weil, PGY3 - probable viral respiratory tract infection, COVID included on the differential, but w/o signs of significant pulmonary compromise. Plan for labs, RVP, COVID swab, CXR, then to L&D for eval of abd pain if no concerning findings.

## 2020-03-24 NOTE — ED ADULT NURSE NOTE - OBJECTIVE STATEMENT
Pt presents to the ED AxOx4, ambulatory, c/o asthma exacerbation, back pain, and fevers. Pt 32 weeks pregnant. Pt states that she started becoming short of breath yesterday morning and it was not getting better. Pt states that she had a slight fever that started this afternoon around 1pm. As per pt, she began having lower back pain that started on Sunday. Pt states that she has been having a decrease in urine output and feels as though she has a UTI. Denies foul smelling urine or pain upon urination. Denies chest pain, nausea, vomiting, diarrhea. Respirations even and unlabored,. Abdomen soft and nontender. Skin warm, dry and intact. 20 gauge IV placed in left antecubital. Blood drawn and sent to lab. Comfort measures provided. Awaiting further orders. Will continue to monitor.

## 2020-03-24 NOTE — ED PROVIDER NOTE - OBJECTIVE STATEMENT
34F hx HTN  @ 32wks gestation c/b gestation DM p/w complaints of abdominal pain, fever, and dyspnea. For the past two days she has had an intermittent sharp non-focal abdominal pain, localizing to several distinct areas in the LUQ, epigastrum, and RUQ, lasting seconds at a time, but also w/ more constant RLQ pain. For the same period of time she has had a fever, cough, and wheezing. +Hx asthma, tried her home inhaler a few times as well. 34F hx HTN  @ 32wks gestation c/b gestation DM p/w complaints of abdominal pain, fever, and dyspnea. For the past two days she has had an intermittent sharp non-focal abdominal pain, localizing to several distinct areas in the LUQ, epigastrum, and RUQ, lasting seconds at a time, but also w/ more constant RLQ pain. For the same period of time she has had a fever, cough, and wheezing. +Hx asthma, tried her home inhaler a few times as well.    Attendinyo female presents with cough, fever, runny nose, congestion.  also with intermittent abdominal pain which is in different areas of the abdomen.

## 2020-03-25 ENCOUNTER — APPOINTMENT (OUTPATIENT)
Dept: ANTEPARTUM | Facility: CLINIC | Age: 35
End: 2020-03-25

## 2020-03-25 LAB
CULTURE RESULTS: SIGNIFICANT CHANGE UP
SARS-COV-2 RNA SPEC QL NAA+PROBE: DETECTED
SPECIMEN SOURCE: SIGNIFICANT CHANGE UP

## 2020-03-26 ENCOUNTER — TRANSCRIPTION ENCOUNTER (OUTPATIENT)
Age: 35
End: 2020-03-26

## 2020-03-26 NOTE — ED POST DISCHARGE NOTE - RESULT SUMMARY
BRET Peacock: UCX contaminated, pt is pregnant. Pt also tested positive for COVID. Spoke w/ pt, feeling better, no shortness of breath or fever, mild cough. WIll repeat her UA/UC w/ her PMD in a week.

## 2020-04-01 ENCOUNTER — APPOINTMENT (OUTPATIENT)
Dept: ANTEPARTUM | Facility: CLINIC | Age: 35
End: 2020-04-01

## 2020-04-01 ENCOUNTER — APPOINTMENT (OUTPATIENT)
Dept: MATERNAL FETAL MEDICINE | Facility: CLINIC | Age: 35
End: 2020-04-01

## 2020-04-08 ENCOUNTER — APPOINTMENT (OUTPATIENT)
Dept: ANTEPARTUM | Facility: CLINIC | Age: 35
End: 2020-04-08

## 2020-04-14 ENCOUNTER — EMERGENCY (EMERGENCY)
Facility: HOSPITAL | Age: 35
LOS: 1 days | Discharge: HOME | End: 2020-04-14
Admitting: EMERGENCY MEDICINE
Payer: COMMERCIAL

## 2020-04-14 VITALS
RESPIRATION RATE: 16 BRPM | SYSTOLIC BLOOD PRESSURE: 143 MMHG | OXYGEN SATURATION: 100 % | TEMPERATURE: 99 F | HEART RATE: 80 BPM | DIASTOLIC BLOOD PRESSURE: 81 MMHG

## 2020-04-14 DIAGNOSIS — Z98.890 OTHER SPECIFIED POSTPROCEDURAL STATES: Chronic | ICD-10-CM

## 2020-04-14 DIAGNOSIS — Z98.84 BARIATRIC SURGERY STATUS: Chronic | ICD-10-CM

## 2020-04-14 PROCEDURE — 99282 EMERGENCY DEPT VISIT SF MDM: CPT

## 2020-04-14 NOTE — ED PROVIDER NOTE - OBJECTIVE STATEMENT
33 y/o F requesting return to work note. States all of her symptoms resolved 4/10. Diagnosed on 3/24. Denies fever, chills, bodyaches, cough, chest pain, abdominal pain, nausea, vomiting, diarrhea. Appears well. O2 sat 100% on RA. Afebrile.

## 2020-04-14 NOTE — ED PROVIDER NOTE - CLINICAL SUMMARY MEDICAL DECISION MAKING FREE TEXT BOX
33 y/o F requesting return to work note. States all of her symptoms resolved 4/10. Diagnosed on 3/24. Denies fever, chills, bodyaches, cough, chest pain, abdominal pain, nausea, vomiting, diarrhea. Appears well. O2 sat 100% on RA. Afebrile. Return to work note provided.

## 2020-04-14 NOTE — ED PROVIDER NOTE - PATIENT PORTAL LINK FT
You can access the FollowMyHealth Patient Portal offered by  by registering at the following website: [email or web address]. By joining [employer]’s FollowMyHealth portal, you will also be able to view your health information using other applications (apps) compatible with our system.

## 2020-04-14 NOTE — ED PROVIDER NOTE - CARE PLAN
Principal Discharge DX:	COVID-19 virus detected  Secondary Diagnosis:	Examination for, medical, general

## 2020-04-14 NOTE — ED PROVIDER NOTE - NSFOLLOWUPINSTRUCTIONS_ED_ALL_ED_FT
Follow up with your PCP as needed.   Wear a mask while at work for 2 more weeks.  Wash your hands.  Worsening, continued or ANY new concerning symptoms return to the emergency department.

## 2020-04-15 ENCOUNTER — APPOINTMENT (OUTPATIENT)
Dept: ANTEPARTUM | Facility: CLINIC | Age: 35
End: 2020-04-15

## 2020-04-16 ENCOUNTER — APPOINTMENT (OUTPATIENT)
Dept: ANTEPARTUM | Facility: HOSPITAL | Age: 35
End: 2020-04-16

## 2020-04-16 ENCOUNTER — ASOB RESULT (OUTPATIENT)
Age: 35
End: 2020-04-16

## 2020-04-16 ENCOUNTER — APPOINTMENT (OUTPATIENT)
Dept: ANTEPARTUM | Facility: CLINIC | Age: 35
End: 2020-04-16
Payer: COMMERCIAL

## 2020-04-16 ENCOUNTER — OUTPATIENT (OUTPATIENT)
Dept: OUTPATIENT SERVICES | Facility: HOSPITAL | Age: 35
LOS: 1 days | End: 2020-04-16

## 2020-04-16 DIAGNOSIS — Z98.890 OTHER SPECIFIED POSTPROCEDURAL STATES: Chronic | ICD-10-CM

## 2020-04-16 DIAGNOSIS — Z98.84 BARIATRIC SURGERY STATUS: Chronic | ICD-10-CM

## 2020-04-16 PROCEDURE — 76818 FETAL BIOPHYS PROFILE W/NST: CPT | Mod: 26

## 2020-04-16 PROCEDURE — 76816 OB US FOLLOW-UP PER FETUS: CPT

## 2020-04-22 ENCOUNTER — APPOINTMENT (OUTPATIENT)
Dept: ANTEPARTUM | Facility: HOSPITAL | Age: 35
End: 2020-04-22

## 2020-04-22 ENCOUNTER — APPOINTMENT (OUTPATIENT)
Dept: ANTEPARTUM | Facility: CLINIC | Age: 35
End: 2020-04-22

## 2020-04-22 ENCOUNTER — OUTPATIENT (OUTPATIENT)
Dept: OUTPATIENT SERVICES | Facility: HOSPITAL | Age: 35
LOS: 1 days | End: 2020-04-22

## 2020-04-22 ENCOUNTER — ASOB RESULT (OUTPATIENT)
Age: 35
End: 2020-04-22

## 2020-04-22 ENCOUNTER — APPOINTMENT (OUTPATIENT)
Dept: ANTEPARTUM | Facility: CLINIC | Age: 35
End: 2020-04-22
Payer: COMMERCIAL

## 2020-04-22 DIAGNOSIS — Z98.890 OTHER SPECIFIED POSTPROCEDURAL STATES: Chronic | ICD-10-CM

## 2020-04-22 DIAGNOSIS — Z98.84 BARIATRIC SURGERY STATUS: Chronic | ICD-10-CM

## 2020-04-22 PROCEDURE — 76819 FETAL BIOPHYS PROFIL W/O NST: CPT

## 2020-04-23 ENCOUNTER — APPOINTMENT (OUTPATIENT)
Dept: ANTEPARTUM | Facility: HOSPITAL | Age: 35
End: 2020-04-23

## 2020-04-23 ENCOUNTER — APPOINTMENT (OUTPATIENT)
Dept: ANTEPARTUM | Facility: CLINIC | Age: 35
End: 2020-04-23

## 2020-04-26 ENCOUNTER — INPATIENT (INPATIENT)
Facility: HOSPITAL | Age: 35
LOS: 4 days | Discharge: HOME HEALTH | End: 2020-05-01
Attending: OBSTETRICS & GYNECOLOGY | Admitting: OBSTETRICS & GYNECOLOGY
Payer: COMMERCIAL

## 2020-04-26 VITALS — HEART RATE: 86 BPM | DIASTOLIC BLOOD PRESSURE: 100 MMHG | SYSTOLIC BLOOD PRESSURE: 188 MMHG

## 2020-04-26 DIAGNOSIS — O13.9 GESTATIONAL [PREGNANCY-INDUCED] HYPERTENSION WITHOUT SIGNIFICANT PROTEINURIA, UNSPECIFIED TRIMESTER: ICD-10-CM

## 2020-04-26 DIAGNOSIS — Z98.890 OTHER SPECIFIED POSTPROCEDURAL STATES: Chronic | ICD-10-CM

## 2020-04-26 DIAGNOSIS — Z98.84 BARIATRIC SURGERY STATUS: Chronic | ICD-10-CM

## 2020-04-26 LAB
ALBUMIN SERPL ELPH-MCNC: 3.7 G/DL — SIGNIFICANT CHANGE UP (ref 3.3–5)
ALP SERPL-CCNC: 176 U/L — HIGH (ref 40–120)
ALT FLD-CCNC: 10 U/L — SIGNIFICANT CHANGE UP (ref 4–33)
ANION GAP SERPL CALC-SCNC: 15 MMO/L — HIGH (ref 7–14)
APPEARANCE UR: CLEAR — SIGNIFICANT CHANGE UP
APTT BLD: 27.7 SEC — SIGNIFICANT CHANGE UP (ref 27.5–36.3)
AST SERPL-CCNC: 16 U/L — SIGNIFICANT CHANGE UP (ref 4–32)
BASOPHILS # BLD AUTO: 0.02 K/UL — SIGNIFICANT CHANGE UP (ref 0–0.2)
BASOPHILS NFR BLD AUTO: 0.2 % — SIGNIFICANT CHANGE UP (ref 0–2)
BILIRUB SERPL-MCNC: 0.2 MG/DL — SIGNIFICANT CHANGE UP (ref 0.2–1.2)
BILIRUB UR-MCNC: NEGATIVE — SIGNIFICANT CHANGE UP
BLD GP AB SCN SERPL QL: NEGATIVE — SIGNIFICANT CHANGE UP
BLOOD UR QL VISUAL: NEGATIVE — SIGNIFICANT CHANGE UP
BUN SERPL-MCNC: 8 MG/DL — SIGNIFICANT CHANGE UP (ref 7–23)
CALCIUM SERPL-MCNC: 9.8 MG/DL — SIGNIFICANT CHANGE UP (ref 8.4–10.5)
CHLORIDE SERPL-SCNC: 103 MMOL/L — SIGNIFICANT CHANGE UP (ref 98–107)
CO2 SERPL-SCNC: 20 MMOL/L — LOW (ref 22–31)
COLOR SPEC: SIGNIFICANT CHANGE UP
CREAT ?TM UR-MCNC: 57 MG/DL — SIGNIFICANT CHANGE UP
CREAT SERPL-MCNC: 0.62 MG/DL — SIGNIFICANT CHANGE UP (ref 0.5–1.3)
EOSINOPHIL # BLD AUTO: 0.34 K/UL — SIGNIFICANT CHANGE UP (ref 0–0.5)
EOSINOPHIL NFR BLD AUTO: 3.9 % — SIGNIFICANT CHANGE UP (ref 0–6)
FIBRINOGEN PPP-MCNC: 682 MG/DL — HIGH (ref 300–520)
GLUCOSE BLDC GLUCOMTR-MCNC: 111 MG/DL — HIGH (ref 70–99)
GLUCOSE BLDC GLUCOMTR-MCNC: 76 MG/DL — SIGNIFICANT CHANGE UP (ref 70–99)
GLUCOSE SERPL-MCNC: 73 MG/DL — SIGNIFICANT CHANGE UP (ref 70–99)
GLUCOSE UR-MCNC: NEGATIVE — SIGNIFICANT CHANGE UP
HCT VFR BLD CALC: 34.6 % — SIGNIFICANT CHANGE UP (ref 34.5–45)
HGB BLD-MCNC: 11.7 G/DL — SIGNIFICANT CHANGE UP (ref 11.5–15.5)
IMM GRANULOCYTES NFR BLD AUTO: 0.9 % — SIGNIFICANT CHANGE UP (ref 0–1.5)
INR BLD: 0.92 — SIGNIFICANT CHANGE UP (ref 0.88–1.17)
KETONES UR-MCNC: HIGH
LDH SERPL L TO P-CCNC: 193 U/L — SIGNIFICANT CHANGE UP (ref 135–225)
LEUKOCYTE ESTERASE UR-ACNC: NEGATIVE — SIGNIFICANT CHANGE UP
LYMPHOCYTES # BLD AUTO: 2.21 K/UL — SIGNIFICANT CHANGE UP (ref 1–3.3)
LYMPHOCYTES # BLD AUTO: 25 % — SIGNIFICANT CHANGE UP (ref 13–44)
MCHC RBC-ENTMCNC: 28.1 PG — SIGNIFICANT CHANGE UP (ref 27–34)
MCHC RBC-ENTMCNC: 33.8 % — SIGNIFICANT CHANGE UP (ref 32–36)
MCV RBC AUTO: 83 FL — SIGNIFICANT CHANGE UP (ref 80–100)
MONOCYTES # BLD AUTO: 0.77 K/UL — SIGNIFICANT CHANGE UP (ref 0–0.9)
MONOCYTES NFR BLD AUTO: 8.7 % — SIGNIFICANT CHANGE UP (ref 2–14)
NEUTROPHILS # BLD AUTO: 5.41 K/UL — SIGNIFICANT CHANGE UP (ref 1.8–7.4)
NEUTROPHILS NFR BLD AUTO: 61.3 % — SIGNIFICANT CHANGE UP (ref 43–77)
NITRITE UR-MCNC: NEGATIVE — SIGNIFICANT CHANGE UP
NRBC # FLD: 0 K/UL — SIGNIFICANT CHANGE UP (ref 0–0)
PH UR: 6.5 — SIGNIFICANT CHANGE UP (ref 5–8)
PLATELET # BLD AUTO: 237 K/UL — SIGNIFICANT CHANGE UP (ref 150–400)
PMV BLD: 11.7 FL — SIGNIFICANT CHANGE UP (ref 7–13)
POTASSIUM SERPL-MCNC: 4.2 MMOL/L — SIGNIFICANT CHANGE UP (ref 3.5–5.3)
POTASSIUM SERPL-SCNC: 4.2 MMOL/L — SIGNIFICANT CHANGE UP (ref 3.5–5.3)
PROT SERPL-MCNC: 7.9 G/DL — SIGNIFICANT CHANGE UP (ref 6–8.3)
PROT UR-MCNC: 9.3 MG/DL — SIGNIFICANT CHANGE UP
PROT UR-MCNC: NEGATIVE — SIGNIFICANT CHANGE UP
PROTHROM AB SERPL-ACNC: 10.6 SEC — SIGNIFICANT CHANGE UP (ref 9.8–13.1)
RBC # BLD: 4.17 M/UL — SIGNIFICANT CHANGE UP (ref 3.8–5.2)
RBC # FLD: 15 % — HIGH (ref 10.3–14.5)
RH IG SCN BLD-IMP: POSITIVE — SIGNIFICANT CHANGE UP
RH IG SCN BLD-IMP: POSITIVE — SIGNIFICANT CHANGE UP
SODIUM SERPL-SCNC: 138 MMOL/L — SIGNIFICANT CHANGE UP (ref 135–145)
SP GR SPEC: 1.01 — SIGNIFICANT CHANGE UP (ref 1–1.04)
URATE SERPL-MCNC: 6.7 MG/DL — SIGNIFICANT CHANGE UP (ref 2.5–7)
UROBILINOGEN FLD QL: NORMAL — SIGNIFICANT CHANGE UP
WBC # BLD: 8.83 K/UL — SIGNIFICANT CHANGE UP (ref 3.8–10.5)
WBC # FLD AUTO: 8.83 K/UL — SIGNIFICANT CHANGE UP (ref 3.8–10.5)

## 2020-04-26 RX ORDER — HYDRALAZINE HCL 50 MG
10 TABLET ORAL ONCE
Refills: 0 | Status: COMPLETED | OUTPATIENT
Start: 2020-04-26 | End: 2020-04-26

## 2020-04-26 RX ORDER — LABETALOL HCL 100 MG
500 TABLET ORAL EVERY 8 HOURS
Refills: 0 | Status: DISCONTINUED | OUTPATIENT
Start: 2020-04-27 | End: 2020-04-28

## 2020-04-26 RX ORDER — INSULIN HUMAN 100 [IU]/ML
1 INJECTION, SOLUTION SUBCUTANEOUS
Qty: 100 | Refills: 0 | Status: DISCONTINUED | OUTPATIENT
Start: 2020-04-26 | End: 2020-04-26

## 2020-04-26 RX ORDER — LABETALOL HCL 100 MG
400 TABLET ORAL
Refills: 0 | Status: DISCONTINUED | OUTPATIENT
Start: 2020-04-26 | End: 2020-04-26

## 2020-04-26 RX ORDER — NIFEDIPINE 30 MG
30 TABLET, EXTENDED RELEASE 24 HR ORAL AT BEDTIME
Refills: 0 | Status: DISCONTINUED | OUTPATIENT
Start: 2020-04-26 | End: 2020-04-29

## 2020-04-26 RX ORDER — OXYTOCIN 10 UNIT/ML
333.33 VIAL (ML) INJECTION
Qty: 20 | Refills: 0 | Status: DISCONTINUED | OUTPATIENT
Start: 2020-04-26 | End: 2020-04-29

## 2020-04-26 RX ORDER — LABETALOL HCL 100 MG
500 TABLET ORAL EVERY 12 HOURS
Refills: 0 | Status: DISCONTINUED | OUTPATIENT
Start: 2020-04-26 | End: 2020-04-26

## 2020-04-26 RX ORDER — SODIUM CHLORIDE 9 MG/ML
1000 INJECTION, SOLUTION INTRAVENOUS
Refills: 0 | Status: DISCONTINUED | OUTPATIENT
Start: 2020-04-26 | End: 2020-04-29

## 2020-04-26 RX ORDER — SODIUM CHLORIDE 9 MG/ML
1000 INJECTION INTRAMUSCULAR; INTRAVENOUS; SUBCUTANEOUS
Refills: 0 | Status: DISCONTINUED | OUTPATIENT
Start: 2020-04-26 | End: 2020-04-29

## 2020-04-26 RX ORDER — MAGNESIUM SULFATE 500 MG/ML
2 VIAL (ML) INJECTION
Qty: 40 | Refills: 0 | Status: DISCONTINUED | OUTPATIENT
Start: 2020-04-26 | End: 2020-04-29

## 2020-04-26 RX ORDER — MAGNESIUM SULFATE 500 MG/ML
4 VIAL (ML) INJECTION ONCE
Refills: 0 | Status: COMPLETED | OUTPATIENT
Start: 2020-04-26 | End: 2020-04-26

## 2020-04-26 RX ORDER — ALBUTEROL 90 UG/1
2 AEROSOL, METERED ORAL EVERY 6 HOURS
Refills: 0 | Status: DISCONTINUED | OUTPATIENT
Start: 2020-04-26 | End: 2020-04-29

## 2020-04-26 RX ADMIN — Medication 400 MILLIGRAM(S): at 16:39

## 2020-04-26 RX ADMIN — Medication 50 GM/HR: at 23:19

## 2020-04-26 RX ADMIN — Medication 300 GRAM(S): at 16:45

## 2020-04-26 RX ADMIN — SODIUM CHLORIDE 50 MILLILITER(S): 9 INJECTION INTRAMUSCULAR; INTRAVENOUS; SUBCUTANEOUS at 16:56

## 2020-04-26 RX ADMIN — Medication 10 MILLIGRAM(S): at 16:42

## 2020-04-26 RX ADMIN — Medication 30 MILLIGRAM(S): at 23:20

## 2020-04-26 RX ADMIN — Medication 500 MILLIGRAM(S): at 23:21

## 2020-04-26 RX ADMIN — Medication 50 GM/HR: at 17:08

## 2020-04-26 NOTE — OB PROVIDER H&P - HISTORY OF PRESENT ILLNESS
Patient is a 35yo G1 at 37w1d with h/o asthma, cHTN, T2DM and COVID infection presenting for scheduled induction due to super-imposed PEC with polyhydramnios and unstable fetal lie. Patient states on Thursday she had an ultrasound in which fetus was vertex and EMA was 26. Patient has cHTN for which she has been taking labetalol 500 BID (10AM/PM), labetalol 400 (4PM), procardia XL 30mg (10PM), however she was diagnosed with super-imposed PEC during pregnancy due to an elevated 24hr urine protein collection (322mg, 350mg on two occasions). Patient was diagnosed with T2DM 2 years ago and glucose levels were well-controlled with lifestyle changes. However, she has been taking metformin 500mg BID for PCOS. Patient had symptomatic COVID infection on 3/26 but has been asymptomatic for over 2 weeks. Patient reports good FM, denies VB, LOF or CTX.    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), chlamydia infection s/p treatment with test of cure 2019, denies h/o fibroids  PMH: T2DM, cHTN, asthma  PSH: gastric sleeve 4/2018, rotator cuff repair 6/2019  Meds: labetalol 500mg BID (10AM/PM) and 400mg once a day (4PM), procardia XL 30 (10PM), metformin 500mg BID, famotidine, zyrtec  All: NKDA  Social: denies alcohol, drug, tobacco use in pregnancy    Vitals: /100, HR 84, O2 99% RA  SSE: no herpes lesions, physiologic discharge  SVE: 0/0/-3  EFM: 140bpm/mod simone/-decels  TOCO: no ctx  Sono: vertex

## 2020-04-26 NOTE — OB PROVIDER H&P - NSHPPHYSICALEXAM_GEN_ALL_CORE
Vitals: /100, HR 84, O2 99% RA  SSE: no herpes lesions, physiologic discharge  SVE: 0/0/-3  EFM: 140bpm/mod simone/-decels  TOCO: no ctx  Sono: vertex

## 2020-04-26 NOTE — CHART NOTE - NSCHARTNOTEFT_GEN_A_CORE
R2 Progress Note    Patient seen and examined at bedside for abdominal sono. Patient c/o feeling large fetal movement, concerned no longer vertex presentation.    NAD  Vital Signs Last 24 Hrs  T(C): 37 (26 Apr 2020 18:29), Max: 37 (26 Apr 2020 18:29)  T(F): 98.6 (26 Apr 2020 18:29), Max: 98.6 (26 Apr 2020 18:29)  HR: 83 (26 Apr 2020 19:57) (73 - 103)  BP: 142/76 (26 Apr 2020 19:55) (119/56 - 188/100)  BP(mean): --  RR: 16 (26 Apr 2020 18:29) (16 - 16)  SpO2: 94% (26 Apr 2020 19:54) (92% - 99%)    SVE deferred  /mod variability/+accels/-decels, discontinuous  TOCO ctx irreg  BSS vertex    HELLP Labs: 04-26-20 @ 16:36                 11.7    8.83 >----------------< 237                      34.6      Fibrinogen: 682.0  PT: 10.6  pTT: 27.7  INR: 0.92  AST: 16  ALT: 10  Creatinine: 0.62  LDH: 193  Uric Acid: 6.7        P/C Ratio 04-26-20 @ 17:50  Urine Protein: 9.3  Urine Creatinine: 57.00    P/C: 0.16    POCT  Blood Glucose (04.26.20 @ 16:48)    POCT Blood Glucose.: 76: RN Notified Readback mg/dL        A/P 34y G 1P0 at 37w1d IOL T2DM, siPEC, poly     - labor: c/w vaginal cytotec, consider cervical balloon. Adjust toco to assess contraction pattern  - fetus: cat 1 FHT, discontinuous. Adjust doppler.  - gbs: neg  - pain: no complaints  - covid: pos, asymptomatic, VS wnl. C/w droplet precautions.  - siPEC: BPs currently well-controlled. C/w labetalol, procardia. HELLP labs wnl 430p, repeat q shift  - T2DM: Euglycemic. C/w FSG and rotating fluids per protocol  - Unstable lie: vertex confirmed 8p      Virginia White PGY2  [id number]

## 2020-04-26 NOTE — OB RN PATIENT PROFILE - CURRENT PREGNANCY COMPLICATIONS, OB PROFILE
Abnormal Amniotic Fluid Volume/Unstable Lie/Preeclampsia/Hypertensive Disorder/Gestational Diabetes/Maternal Medical Condition/Polyhydramnios

## 2020-04-26 NOTE — OB PROVIDER H&P - PROBLEM SELECTOR PLAN 1
- Admit to L&D  - BP on admission in the severe range, hydralazine 10mg IVP administered. Patient meets criteria for siPEC with severe features and will be started on magnesium gtt.  - Will continue home BP meds  - HELLP labs q6h  - Fingerstick on admission 77, will discuss if patient needs to be on insulin gtt per protocol for T2DM  - Plan to scan patient with exams given unstable lie and polyhydramnios  - IOL with vaginal cytotec and cervical balloon  - Monitor O2 sat given COVID positive status    D/w Dr. Steve Branch, PGY1

## 2020-04-26 NOTE — OB RN PATIENT PROFILE - NAME OF FATHER, OB PROFILE
not involved pt reports recent troubles in relationship with FOB, unsure what level of future involvement will be

## 2020-04-26 NOTE — OB PROVIDER H&P - CURRENT PREGNANCY COMPLICATIONS, OB PROFILE
Abnormal Amniotic Fluid Volume/Hypertensive Disorder/Preeclampsia/Unstable Lie/Maternal Medical Condition/Polyhydramnios

## 2020-04-26 NOTE — CHART NOTE - NSCHARTNOTEFT_GEN_A_CORE
NP note    Pt seen for placement of vaginal cytotec #1    HR: 91 (26 Apr 2020 17:26) (86 - 103)  BP: 135/78 (26 Apr 2020 17:25) (135/78 - 188/100)  BP(mean): --  SpO2: 97% (26 Apr 2020 17:26) (93% - 99%)  /moderate variability/+ accels/no decels  [place] q2-5min  SVE 0/0/-3    Vaginal cytotec #1 placed without incident. Pt tolerated well.     -Re-evaluate in 4 hours  -Assess for possible cervical balloon placement next exam    RANDY ramirez

## 2020-04-26 NOTE — OB PROVIDER H&P - PMH
Asthma  only related to URI  COVID-19 virus infection    Diabetes mellitus, type 2    Hypertension  controlled by medication  PCOS (polycystic ovarian syndrome)

## 2020-04-26 NOTE — OB PROVIDER H&P - ASSESSMENT
A/P: Patient is a 35yo G1 at 37w1d with h/o asthma, cHTN, T2DM and COVID infection presenting for scheduled induction due to super-imposed PEC with polyhydramnios and unstable fetal lie.

## 2020-04-26 NOTE — OB PROVIDER H&P - NS_OBGYNHISTORY_OBGYN_ALL_OB_FT
Pob: current  Pgyn: +HSV (last outbreak 2019), chlamydia infection s/p treatment with test of cure 2019, denies h/o fibroids

## 2020-04-27 LAB
ALBUMIN SERPL ELPH-MCNC: 3.5 G/DL — SIGNIFICANT CHANGE UP (ref 3.3–5)
ALBUMIN SERPL ELPH-MCNC: 3.6 G/DL — SIGNIFICANT CHANGE UP (ref 3.3–5)
ALBUMIN SERPL ELPH-MCNC: 3.6 G/DL — SIGNIFICANT CHANGE UP (ref 3.3–5)
ALBUMIN SERPL ELPH-MCNC: 3.8 G/DL — SIGNIFICANT CHANGE UP (ref 3.3–5)
ALP SERPL-CCNC: 168 U/L — HIGH (ref 40–120)
ALP SERPL-CCNC: 172 U/L — HIGH (ref 40–120)
ALP SERPL-CCNC: 174 U/L — HIGH (ref 40–120)
ALP SERPL-CCNC: 178 U/L — HIGH (ref 40–120)
ALT FLD-CCNC: 11 U/L — SIGNIFICANT CHANGE UP (ref 4–33)
ALT FLD-CCNC: 12 U/L — SIGNIFICANT CHANGE UP (ref 4–33)
ALT FLD-CCNC: 12 U/L — SIGNIFICANT CHANGE UP (ref 4–33)
ALT FLD-CCNC: 8 U/L — SIGNIFICANT CHANGE UP (ref 4–33)
ANION GAP SERPL CALC-SCNC: 12 MMO/L — SIGNIFICANT CHANGE UP (ref 7–14)
ANION GAP SERPL CALC-SCNC: 12 MMO/L — SIGNIFICANT CHANGE UP (ref 7–14)
ANION GAP SERPL CALC-SCNC: 14 MMO/L — SIGNIFICANT CHANGE UP (ref 7–14)
ANION GAP SERPL CALC-SCNC: 14 MMO/L — SIGNIFICANT CHANGE UP (ref 7–14)
APTT BLD: 26.2 SEC — LOW (ref 27.5–36.3)
APTT BLD: 27.1 SEC — LOW (ref 27.5–36.3)
APTT BLD: 28.2 SEC — SIGNIFICANT CHANGE UP (ref 27.5–36.3)
APTT BLD: 28.6 SEC — SIGNIFICANT CHANGE UP (ref 27.5–36.3)
AST SERPL-CCNC: 14 U/L — SIGNIFICANT CHANGE UP (ref 4–32)
AST SERPL-CCNC: 18 U/L — SIGNIFICANT CHANGE UP (ref 4–32)
BASOPHILS # BLD AUTO: 0.01 K/UL — SIGNIFICANT CHANGE UP (ref 0–0.2)
BASOPHILS # BLD AUTO: 0.01 K/UL — SIGNIFICANT CHANGE UP (ref 0–0.2)
BASOPHILS # BLD AUTO: 0.03 K/UL — SIGNIFICANT CHANGE UP (ref 0–0.2)
BASOPHILS # BLD AUTO: 0.03 K/UL — SIGNIFICANT CHANGE UP (ref 0–0.2)
BASOPHILS NFR BLD AUTO: 0.1 % — SIGNIFICANT CHANGE UP (ref 0–2)
BASOPHILS NFR BLD AUTO: 0.1 % — SIGNIFICANT CHANGE UP (ref 0–2)
BASOPHILS NFR BLD AUTO: 0.3 % — SIGNIFICANT CHANGE UP (ref 0–2)
BASOPHILS NFR BLD AUTO: 0.3 % — SIGNIFICANT CHANGE UP (ref 0–2)
BILIRUB SERPL-MCNC: 0.2 MG/DL — SIGNIFICANT CHANGE UP (ref 0.2–1.2)
BILIRUB SERPL-MCNC: 0.2 MG/DL — SIGNIFICANT CHANGE UP (ref 0.2–1.2)
BILIRUB SERPL-MCNC: < 0.2 MG/DL — LOW (ref 0.2–1.2)
BILIRUB SERPL-MCNC: < 0.2 MG/DL — LOW (ref 0.2–1.2)
BUN SERPL-MCNC: 7 MG/DL — SIGNIFICANT CHANGE UP (ref 7–23)
BUN SERPL-MCNC: 8 MG/DL — SIGNIFICANT CHANGE UP (ref 7–23)
CALCIUM SERPL-MCNC: 8.5 MG/DL — SIGNIFICANT CHANGE UP (ref 8.4–10.5)
CALCIUM SERPL-MCNC: 8.6 MG/DL — SIGNIFICANT CHANGE UP (ref 8.4–10.5)
CALCIUM SERPL-MCNC: 8.6 MG/DL — SIGNIFICANT CHANGE UP (ref 8.4–10.5)
CALCIUM SERPL-MCNC: 9 MG/DL — SIGNIFICANT CHANGE UP (ref 8.4–10.5)
CHLORIDE SERPL-SCNC: 100 MMOL/L — SIGNIFICANT CHANGE UP (ref 98–107)
CHLORIDE SERPL-SCNC: 100 MMOL/L — SIGNIFICANT CHANGE UP (ref 98–107)
CHLORIDE SERPL-SCNC: 101 MMOL/L — SIGNIFICANT CHANGE UP (ref 98–107)
CHLORIDE SERPL-SCNC: 102 MMOL/L — SIGNIFICANT CHANGE UP (ref 98–107)
CO2 SERPL-SCNC: 18 MMOL/L — LOW (ref 22–31)
CO2 SERPL-SCNC: 19 MMOL/L — LOW (ref 22–31)
CO2 SERPL-SCNC: 19 MMOL/L — LOW (ref 22–31)
CO2 SERPL-SCNC: 20 MMOL/L — LOW (ref 22–31)
CREAT SERPL-MCNC: 0.6 MG/DL — SIGNIFICANT CHANGE UP (ref 0.5–1.3)
CREAT SERPL-MCNC: 0.65 MG/DL — SIGNIFICANT CHANGE UP (ref 0.5–1.3)
CREAT SERPL-MCNC: 0.66 MG/DL — SIGNIFICANT CHANGE UP (ref 0.5–1.3)
CREAT SERPL-MCNC: 0.71 MG/DL — SIGNIFICANT CHANGE UP (ref 0.5–1.3)
EOSINOPHIL # BLD AUTO: 0.1 K/UL — SIGNIFICANT CHANGE UP (ref 0–0.5)
EOSINOPHIL # BLD AUTO: 0.13 K/UL — SIGNIFICANT CHANGE UP (ref 0–0.5)
EOSINOPHIL # BLD AUTO: 0.14 K/UL — SIGNIFICANT CHANGE UP (ref 0–0.5)
EOSINOPHIL # BLD AUTO: 0.22 K/UL — SIGNIFICANT CHANGE UP (ref 0–0.5)
EOSINOPHIL NFR BLD AUTO: 1 % — SIGNIFICANT CHANGE UP (ref 0–6)
EOSINOPHIL NFR BLD AUTO: 1.4 % — SIGNIFICANT CHANGE UP (ref 0–6)
EOSINOPHIL NFR BLD AUTO: 1.5 % — SIGNIFICANT CHANGE UP (ref 0–6)
EOSINOPHIL NFR BLD AUTO: 2.5 % — SIGNIFICANT CHANGE UP (ref 0–6)
FIBRINOGEN PPP-MCNC: 619 MG/DL — HIGH (ref 300–520)
FIBRINOGEN PPP-MCNC: 621 MG/DL — HIGH (ref 300–520)
FIBRINOGEN PPP-MCNC: 652 MG/DL — HIGH (ref 300–520)
GLUCOSE BLDC GLUCOMTR-MCNC: 102 MG/DL — HIGH (ref 70–99)
GLUCOSE BLDC GLUCOMTR-MCNC: 103 MG/DL — HIGH (ref 70–99)
GLUCOSE BLDC GLUCOMTR-MCNC: 111 MG/DL — HIGH (ref 70–99)
GLUCOSE BLDC GLUCOMTR-MCNC: 114 MG/DL — HIGH (ref 70–99)
GLUCOSE BLDC GLUCOMTR-MCNC: 95 MG/DL — SIGNIFICANT CHANGE UP (ref 70–99)
GLUCOSE BLDC GLUCOMTR-MCNC: 96 MG/DL — SIGNIFICANT CHANGE UP (ref 70–99)
GLUCOSE SERPL-MCNC: 101 MG/DL — HIGH (ref 70–99)
GLUCOSE SERPL-MCNC: 107 MG/DL — HIGH (ref 70–99)
GLUCOSE SERPL-MCNC: 113 MG/DL — HIGH (ref 70–99)
GLUCOSE SERPL-MCNC: 90 MG/DL — SIGNIFICANT CHANGE UP (ref 70–99)
HCT VFR BLD CALC: 31.9 % — LOW (ref 34.5–45)
HCT VFR BLD CALC: 32.1 % — LOW (ref 34.5–45)
HCT VFR BLD CALC: 32.1 % — LOW (ref 34.5–45)
HCT VFR BLD CALC: 32.2 % — LOW (ref 34.5–45)
HGB BLD-MCNC: 10.8 G/DL — LOW (ref 11.5–15.5)
HGB BLD-MCNC: 10.9 G/DL — LOW (ref 11.5–15.5)
HGB BLD-MCNC: 10.9 G/DL — LOW (ref 11.5–15.5)
HGB BLD-MCNC: 11 G/DL — LOW (ref 11.5–15.5)
IMM GRANULOCYTES NFR BLD AUTO: 0.5 % — SIGNIFICANT CHANGE UP (ref 0–1.5)
IMM GRANULOCYTES NFR BLD AUTO: 0.6 % — SIGNIFICANT CHANGE UP (ref 0–1.5)
IMM GRANULOCYTES NFR BLD AUTO: 0.7 % — SIGNIFICANT CHANGE UP (ref 0–1.5)
IMM GRANULOCYTES NFR BLD AUTO: 0.8 % — SIGNIFICANT CHANGE UP (ref 0–1.5)
INR BLD: 0.9 — SIGNIFICANT CHANGE UP (ref 0.88–1.17)
INR BLD: 0.91 — SIGNIFICANT CHANGE UP (ref 0.88–1.17)
INR BLD: 0.92 — SIGNIFICANT CHANGE UP (ref 0.88–1.17)
LDH SERPL L TO P-CCNC: 151 U/L — SIGNIFICANT CHANGE UP (ref 135–225)
LDH SERPL L TO P-CCNC: 156 U/L — SIGNIFICANT CHANGE UP (ref 135–225)
LDH SERPL L TO P-CCNC: 201 U/L — SIGNIFICANT CHANGE UP (ref 135–225)
LDH SERPL L TO P-CCNC: 238 U/L — HIGH (ref 135–225)
LYMPHOCYTES # BLD AUTO: 1.4 K/UL — SIGNIFICANT CHANGE UP (ref 1–3.3)
LYMPHOCYTES # BLD AUTO: 1.45 K/UL — SIGNIFICANT CHANGE UP (ref 1–3.3)
LYMPHOCYTES # BLD AUTO: 1.52 K/UL — SIGNIFICANT CHANGE UP (ref 1–3.3)
LYMPHOCYTES # BLD AUTO: 1.87 K/UL — SIGNIFICANT CHANGE UP (ref 1–3.3)
LYMPHOCYTES # BLD AUTO: 14.6 % — SIGNIFICANT CHANGE UP (ref 13–44)
LYMPHOCYTES # BLD AUTO: 15.9 % — SIGNIFICANT CHANGE UP (ref 13–44)
LYMPHOCYTES # BLD AUTO: 16.1 % — SIGNIFICANT CHANGE UP (ref 13–44)
LYMPHOCYTES # BLD AUTO: 21.6 % — SIGNIFICANT CHANGE UP (ref 13–44)
MAGNESIUM SERPL-MCNC: 4.4 MG/DL — HIGH (ref 1.6–2.6)
MAGNESIUM SERPL-MCNC: 5 MG/DL — HIGH (ref 1.6–2.6)
MAGNESIUM SERPL-MCNC: 5.7 MG/DL — HIGH (ref 1.6–2.6)
MAGNESIUM SERPL-MCNC: 5.8 MG/DL — HIGH (ref 1.6–2.6)
MCHC RBC-ENTMCNC: 28.3 PG — SIGNIFICANT CHANGE UP (ref 27–34)
MCHC RBC-ENTMCNC: 28.3 PG — SIGNIFICANT CHANGE UP (ref 27–34)
MCHC RBC-ENTMCNC: 28.4 PG — SIGNIFICANT CHANGE UP (ref 27–34)
MCHC RBC-ENTMCNC: 28.6 PG — SIGNIFICANT CHANGE UP (ref 27–34)
MCHC RBC-ENTMCNC: 33.6 % — SIGNIFICANT CHANGE UP (ref 32–36)
MCHC RBC-ENTMCNC: 34 % — SIGNIFICANT CHANGE UP (ref 32–36)
MCHC RBC-ENTMCNC: 34.2 % — SIGNIFICANT CHANGE UP (ref 32–36)
MCHC RBC-ENTMCNC: 34.2 % — SIGNIFICANT CHANGE UP (ref 32–36)
MCV RBC AUTO: 83.1 FL — SIGNIFICANT CHANGE UP (ref 80–100)
MCV RBC AUTO: 83.4 FL — SIGNIFICANT CHANGE UP (ref 80–100)
MCV RBC AUTO: 83.9 FL — SIGNIFICANT CHANGE UP (ref 80–100)
MCV RBC AUTO: 84 FL — SIGNIFICANT CHANGE UP (ref 80–100)
MONOCYTES # BLD AUTO: 0.71 K/UL — SIGNIFICANT CHANGE UP (ref 0–0.9)
MONOCYTES # BLD AUTO: 0.75 K/UL — SIGNIFICANT CHANGE UP (ref 0–0.9)
MONOCYTES # BLD AUTO: 0.91 K/UL — HIGH (ref 0–0.9)
MONOCYTES # BLD AUTO: 0.97 K/UL — HIGH (ref 0–0.9)
MONOCYTES NFR BLD AUTO: 8.2 % — SIGNIFICANT CHANGE UP (ref 2–14)
MONOCYTES NFR BLD AUTO: 8.7 % — SIGNIFICANT CHANGE UP (ref 2–14)
MONOCYTES NFR BLD AUTO: 9.5 % — SIGNIFICANT CHANGE UP (ref 2–14)
MONOCYTES NFR BLD AUTO: 9.8 % — SIGNIFICANT CHANGE UP (ref 2–14)
NEUTROPHILS # BLD AUTO: 5.72 K/UL — SIGNIFICANT CHANGE UP (ref 1.8–7.4)
NEUTROPHILS # BLD AUTO: 6.38 K/UL — SIGNIFICANT CHANGE UP (ref 1.8–7.4)
NEUTROPHILS # BLD AUTO: 6.97 K/UL — SIGNIFICANT CHANGE UP (ref 1.8–7.4)
NEUTROPHILS # BLD AUTO: 7.28 K/UL — SIGNIFICANT CHANGE UP (ref 1.8–7.4)
NEUTROPHILS NFR BLD AUTO: 66.1 % — SIGNIFICANT CHANGE UP (ref 43–77)
NEUTROPHILS NFR BLD AUTO: 73 % — SIGNIFICANT CHANGE UP (ref 43–77)
NEUTROPHILS NFR BLD AUTO: 73.2 % — SIGNIFICANT CHANGE UP (ref 43–77)
NEUTROPHILS NFR BLD AUTO: 73.5 % — SIGNIFICANT CHANGE UP (ref 43–77)
NRBC # FLD: 0 K/UL — SIGNIFICANT CHANGE UP (ref 0–0)
PLATELET # BLD AUTO: 231 K/UL — SIGNIFICANT CHANGE UP (ref 150–400)
PLATELET # BLD AUTO: 234 K/UL — SIGNIFICANT CHANGE UP (ref 150–400)
PLATELET # BLD AUTO: 235 K/UL — SIGNIFICANT CHANGE UP (ref 150–400)
PLATELET # BLD AUTO: 238 K/UL — SIGNIFICANT CHANGE UP (ref 150–400)
PMV BLD: 11.1 FL — SIGNIFICANT CHANGE UP (ref 7–13)
PMV BLD: 11.4 FL — SIGNIFICANT CHANGE UP (ref 7–13)
PMV BLD: 11.5 FL — SIGNIFICANT CHANGE UP (ref 7–13)
PMV BLD: 11.5 FL — SIGNIFICANT CHANGE UP (ref 7–13)
POTASSIUM SERPL-MCNC: 4 MMOL/L — SIGNIFICANT CHANGE UP (ref 3.5–5.3)
POTASSIUM SERPL-MCNC: 4.1 MMOL/L — SIGNIFICANT CHANGE UP (ref 3.5–5.3)
POTASSIUM SERPL-MCNC: 4.2 MMOL/L — SIGNIFICANT CHANGE UP (ref 3.5–5.3)
POTASSIUM SERPL-MCNC: 4.3 MMOL/L — SIGNIFICANT CHANGE UP (ref 3.5–5.3)
POTASSIUM SERPL-SCNC: 4 MMOL/L — SIGNIFICANT CHANGE UP (ref 3.5–5.3)
POTASSIUM SERPL-SCNC: 4.1 MMOL/L — SIGNIFICANT CHANGE UP (ref 3.5–5.3)
POTASSIUM SERPL-SCNC: 4.2 MMOL/L — SIGNIFICANT CHANGE UP (ref 3.5–5.3)
POTASSIUM SERPL-SCNC: 4.3 MMOL/L — SIGNIFICANT CHANGE UP (ref 3.5–5.3)
PROT SERPL-MCNC: 7.2 G/DL — SIGNIFICANT CHANGE UP (ref 6–8.3)
PROT SERPL-MCNC: 7.5 G/DL — SIGNIFICANT CHANGE UP (ref 6–8.3)
PROT SERPL-MCNC: 7.6 G/DL — SIGNIFICANT CHANGE UP (ref 6–8.3)
PROT SERPL-MCNC: 7.6 G/DL — SIGNIFICANT CHANGE UP (ref 6–8.3)
PROTHROM AB SERPL-ACNC: 10.3 SEC — SIGNIFICANT CHANGE UP (ref 9.8–13.1)
PROTHROM AB SERPL-ACNC: 10.4 SEC — SIGNIFICANT CHANGE UP (ref 9.8–13.1)
PROTHROM AB SERPL-ACNC: 10.5 SEC — SIGNIFICANT CHANGE UP (ref 9.8–13.1)
RBC # BLD: 3.82 M/UL — SIGNIFICANT CHANGE UP (ref 3.8–5.2)
RBC # BLD: 3.84 M/UL — SIGNIFICANT CHANGE UP (ref 3.8–5.2)
RBC # BLD: 3.84 M/UL — SIGNIFICANT CHANGE UP (ref 3.8–5.2)
RBC # BLD: 3.85 M/UL — SIGNIFICANT CHANGE UP (ref 3.8–5.2)
RBC # FLD: 14.8 % — HIGH (ref 10.3–14.5)
RBC # FLD: 14.9 % — HIGH (ref 10.3–14.5)
RBC # FLD: 14.9 % — HIGH (ref 10.3–14.5)
RBC # FLD: 15.1 % — HIGH (ref 10.3–14.5)
SARS-COV-2 RNA SPEC QL NAA+PROBE: DETECTED
SODIUM SERPL-SCNC: 131 MMOL/L — LOW (ref 135–145)
SODIUM SERPL-SCNC: 132 MMOL/L — LOW (ref 135–145)
SODIUM SERPL-SCNC: 133 MMOL/L — LOW (ref 135–145)
SODIUM SERPL-SCNC: 135 MMOL/L — SIGNIFICANT CHANGE UP (ref 135–145)
T PALLIDUM AB TITR SER: NEGATIVE — SIGNIFICANT CHANGE UP
URATE SERPL-MCNC: 7.1 MG/DL — HIGH (ref 2.5–7)
URATE SERPL-MCNC: 7.7 MG/DL — HIGH (ref 2.5–7)
URATE SERPL-MCNC: 8.5 MG/DL — HIGH (ref 2.5–7)
WBC # BLD: 8.66 K/UL — SIGNIFICANT CHANGE UP (ref 3.8–10.5)
WBC # BLD: 8.71 K/UL — SIGNIFICANT CHANGE UP (ref 3.8–10.5)
WBC # BLD: 9.56 K/UL — SIGNIFICANT CHANGE UP (ref 3.8–10.5)
WBC # BLD: 9.91 K/UL — SIGNIFICANT CHANGE UP (ref 3.8–10.5)
WBC # FLD AUTO: 8.66 K/UL — SIGNIFICANT CHANGE UP (ref 3.8–10.5)
WBC # FLD AUTO: 8.71 K/UL — SIGNIFICANT CHANGE UP (ref 3.8–10.5)
WBC # FLD AUTO: 9.56 K/UL — SIGNIFICANT CHANGE UP (ref 3.8–10.5)
WBC # FLD AUTO: 9.91 K/UL — SIGNIFICANT CHANGE UP (ref 3.8–10.5)

## 2020-04-27 RX ADMIN — Medication 500 MILLIGRAM(S): at 15:22

## 2020-04-27 RX ADMIN — Medication 500 MILLIGRAM(S): at 07:25

## 2020-04-27 RX ADMIN — Medication 30 MILLIGRAM(S): at 23:22

## 2020-04-27 RX ADMIN — Medication 500 MILLIGRAM(S): at 23:22

## 2020-04-27 RX ADMIN — Medication 50 GM/HR: at 07:26

## 2020-04-27 NOTE — CHART NOTE - NSCHARTNOTEFT_GEN_A_CORE
R1 Progress Note    Pt checked for placement of CB and repeat COVID swab.    Vital Signs Last 24 Hrs  T(C): 37 (26 Apr 2020 18:29), Max: 37 (26 Apr 2020 18:29)  T(F): 98.6 (26 Apr 2020 18:29), Max: 98.6 (26 Apr 2020 18:29)  HR: 85 (27 Apr 2020 00:02) (73 - 111)  BP: 148/80 (26 Apr 2020 23:40) (119/56 - 188/100)  RR: 16 (26 Apr 2020 18:29) (16 - 16)  SpO2: 99% (27 Apr 2020 00:02) (92% - 99%)    /mod/+accel/-decel  [place] irreg  VE 0.5/50/-3    CB 60/60 cc sterile saline in uterine/vaginal balloons placed without incident  switched to PO cytotec 2/2 inability to administer vaginal cytotec per protocol  COVID swab collected    c/w plan per previously d/w Dr. Fabian Green PGY-1

## 2020-04-27 NOTE — CHART NOTE - NSCHARTNOTEFT_GEN_A_CORE
R2 Tracing Note    Vital Signs Last 24 Hrs  T(C): 37.1 (2020 23:21), Max: 37.1 (2020 23:21)  T(F): 98.78 (2020 23:21), Max: 98.78 (2020 23:21)  HR: 86 (2020 23:40) (67 - 120)  BP: 148/75 (2020 23:24) (89/42 - 177/82)  BP(mean): --  RR: --  SpO2: 97% (2020 23:36) (89% - 99%)    SVE deferred  /mod variability/+accels/-decels  TOCO ctx q2-3m    POCT Blood Glucose.: 114 mg/dL (20 @ 17:01)  POCT Blood Glucose.: 103: MD Notified Readback mg/dL (20 @ 21:32)    A/P 34y  at 37w2d IOL T2DM and siPEC     - labor: c/w PO cytotec, CB to be removed at 12a (24h). Further plan pending exam at that time  - fetus: cat 1 FHT, cont toco/efm  - gbs: neg  - pain: no complaints  - covid: pos, asymptomatic. Vitals wnl.  - T2DM: euglycemic. C/w rotating fluids and FSG per protocol  - siPEC: c/w Mg for seizure prophylaxis. HELLP labs q6h currently wnl. BPs mild range with oral antihypertensives    Marcela Rhodes PGY2  [id number]

## 2020-04-27 NOTE — CHART NOTE - NSCHARTNOTEFT_GEN_A_CORE
Confirmed VTX presentation.    Nguyen Green PGY-1 Confirmed VTX presentation.    Seen w/Yee Chavez PGY-4  Nguyen Green PGY-1

## 2020-04-27 NOTE — CHART NOTE - NSCHARTNOTEFT_GEN_A_CORE
R1 OB Progress Note    Patient seen and evaluated at bedside.  Denies complaints.  Comfortable w/ epidural.      T(C): 37.0 (04-27-20 @ 09:00), Max: 37 (04-26-20 @ 18:29)  HR: 85 (04-27-20 @ 11:51) (67 - 120)  BP: 136/65 (04-27-20 @ 11:40) (89/42 - 188/100)  RR: 16 (04-26-20 @ 18:29) (16 - 16)  SpO2: 97% (04-27-20 @ 11:51) (89% - 99%)    SVE: 1/80/-3, Balloon intact  EFM: 125, mod simone, + accels,- decels JOCELYN  [place]:  q4-5m  BSUS: vertex      A/P 34y P0 admitted for IOL for siPEC currently on Mg and also has T2DM  -Labor: Currently on PO cytotec with cervical balloon in place  -Fetus: JOCELYN  -GBS neg  -Analgesia: epidural effect    Demetris Justice PGY1  d/w

## 2020-04-28 ENCOUNTER — TRANSCRIPTION ENCOUNTER (OUTPATIENT)
Age: 35
End: 2020-04-28

## 2020-04-28 LAB
ALBUMIN SERPL ELPH-MCNC: 3.3 G/DL — SIGNIFICANT CHANGE UP (ref 3.3–5)
ALBUMIN SERPL ELPH-MCNC: 3.4 G/DL — SIGNIFICANT CHANGE UP (ref 3.3–5)
ALBUMIN SERPL ELPH-MCNC: 3.4 G/DL — SIGNIFICANT CHANGE UP (ref 3.3–5)
ALBUMIN SERPL ELPH-MCNC: 3.5 G/DL — SIGNIFICANT CHANGE UP (ref 3.3–5)
ALP SERPL-CCNC: 179 U/L — HIGH (ref 40–120)
ALP SERPL-CCNC: 181 U/L — HIGH (ref 40–120)
ALP SERPL-CCNC: 182 U/L — HIGH (ref 40–120)
ALP SERPL-CCNC: 183 U/L — HIGH (ref 40–120)
ALT FLD-CCNC: 11 U/L — SIGNIFICANT CHANGE UP (ref 4–33)
ALT FLD-CCNC: 11 U/L — SIGNIFICANT CHANGE UP (ref 4–33)
ALT FLD-CCNC: 12 U/L — SIGNIFICANT CHANGE UP (ref 4–33)
ALT FLD-CCNC: 12 U/L — SIGNIFICANT CHANGE UP (ref 4–33)
ANION GAP SERPL CALC-SCNC: 12 MMO/L — SIGNIFICANT CHANGE UP (ref 7–14)
ANION GAP SERPL CALC-SCNC: 12 MMO/L — SIGNIFICANT CHANGE UP (ref 7–14)
ANION GAP SERPL CALC-SCNC: 13 MMO/L — SIGNIFICANT CHANGE UP (ref 7–14)
ANION GAP SERPL CALC-SCNC: 17 MMO/L — HIGH (ref 7–14)
APTT BLD: 24.4 SEC — LOW (ref 27.5–36.3)
APTT BLD: 24.8 SEC — LOW (ref 27.5–36.3)
APTT BLD: 26.7 SEC — LOW (ref 27.5–36.3)
APTT BLD: 27.3 SEC — LOW (ref 27.5–36.3)
AST SERPL-CCNC: 15 U/L — SIGNIFICANT CHANGE UP (ref 4–32)
AST SERPL-CCNC: 17 U/L — SIGNIFICANT CHANGE UP (ref 4–32)
BASOPHILS # BLD AUTO: 0.01 K/UL — SIGNIFICANT CHANGE UP (ref 0–0.2)
BASOPHILS # BLD AUTO: 0.02 K/UL — SIGNIFICANT CHANGE UP (ref 0–0.2)
BASOPHILS # BLD AUTO: 0.02 K/UL — SIGNIFICANT CHANGE UP (ref 0–0.2)
BASOPHILS # BLD AUTO: 0.03 K/UL — SIGNIFICANT CHANGE UP (ref 0–0.2)
BASOPHILS NFR BLD AUTO: 0.1 % — SIGNIFICANT CHANGE UP (ref 0–2)
BASOPHILS NFR BLD AUTO: 0.2 % — SIGNIFICANT CHANGE UP (ref 0–2)
BASOPHILS NFR BLD AUTO: 0.2 % — SIGNIFICANT CHANGE UP (ref 0–2)
BASOPHILS NFR BLD AUTO: 0.3 % — SIGNIFICANT CHANGE UP (ref 0–2)
BILIRUB SERPL-MCNC: 0.2 MG/DL — SIGNIFICANT CHANGE UP (ref 0.2–1.2)
BILIRUB SERPL-MCNC: 0.2 MG/DL — SIGNIFICANT CHANGE UP (ref 0.2–1.2)
BILIRUB SERPL-MCNC: 0.3 MG/DL — SIGNIFICANT CHANGE UP (ref 0.2–1.2)
BILIRUB SERPL-MCNC: 0.3 MG/DL — SIGNIFICANT CHANGE UP (ref 0.2–1.2)
BUN SERPL-MCNC: 11 MG/DL — SIGNIFICANT CHANGE UP (ref 7–23)
BUN SERPL-MCNC: 11 MG/DL — SIGNIFICANT CHANGE UP (ref 7–23)
BUN SERPL-MCNC: 13 MG/DL — SIGNIFICANT CHANGE UP (ref 7–23)
BUN SERPL-MCNC: 9 MG/DL — SIGNIFICANT CHANGE UP (ref 7–23)
CALCIUM SERPL-MCNC: 8.8 MG/DL — SIGNIFICANT CHANGE UP (ref 8.4–10.5)
CALCIUM SERPL-MCNC: 9 MG/DL — SIGNIFICANT CHANGE UP (ref 8.4–10.5)
CALCIUM SERPL-MCNC: 9 MG/DL — SIGNIFICANT CHANGE UP (ref 8.4–10.5)
CALCIUM SERPL-MCNC: 9.2 MG/DL — SIGNIFICANT CHANGE UP (ref 8.4–10.5)
CHLORIDE SERPL-SCNC: 101 MMOL/L — SIGNIFICANT CHANGE UP (ref 98–107)
CHLORIDE SERPL-SCNC: 102 MMOL/L — SIGNIFICANT CHANGE UP (ref 98–107)
CO2 SERPL-SCNC: 16 MMOL/L — LOW (ref 22–31)
CO2 SERPL-SCNC: 18 MMOL/L — LOW (ref 22–31)
CO2 SERPL-SCNC: 18 MMOL/L — LOW (ref 22–31)
CO2 SERPL-SCNC: 19 MMOL/L — LOW (ref 22–31)
CREAT SERPL-MCNC: 0.76 MG/DL — SIGNIFICANT CHANGE UP (ref 0.5–1.3)
CREAT SERPL-MCNC: 0.81 MG/DL — SIGNIFICANT CHANGE UP (ref 0.5–1.3)
CREAT SERPL-MCNC: 0.85 MG/DL — SIGNIFICANT CHANGE UP (ref 0.5–1.3)
CREAT SERPL-MCNC: 0.94 MG/DL — SIGNIFICANT CHANGE UP (ref 0.5–1.3)
EOSINOPHIL # BLD AUTO: 0.08 K/UL — SIGNIFICANT CHANGE UP (ref 0–0.5)
EOSINOPHIL # BLD AUTO: 0.11 K/UL — SIGNIFICANT CHANGE UP (ref 0–0.5)
EOSINOPHIL # BLD AUTO: 0.11 K/UL — SIGNIFICANT CHANGE UP (ref 0–0.5)
EOSINOPHIL # BLD AUTO: 0.12 K/UL — SIGNIFICANT CHANGE UP (ref 0–0.5)
EOSINOPHIL NFR BLD AUTO: 0.8 % — SIGNIFICANT CHANGE UP (ref 0–6)
EOSINOPHIL NFR BLD AUTO: 1.1 % — SIGNIFICANT CHANGE UP (ref 0–6)
EOSINOPHIL NFR BLD AUTO: 1.2 % — SIGNIFICANT CHANGE UP (ref 0–6)
EOSINOPHIL NFR BLD AUTO: 1.3 % — SIGNIFICANT CHANGE UP (ref 0–6)
FIBRINOGEN PPP-MCNC: 667 MG/DL — HIGH (ref 300–520)
FIBRINOGEN PPP-MCNC: 676 MG/DL — HIGH (ref 300–520)
FIBRINOGEN PPP-MCNC: 741 MG/DL — HIGH (ref 300–520)
FIBRINOGEN PPP-MCNC: 776 MG/DL — HIGH (ref 300–520)
GLUCOSE BLDC GLUCOMTR-MCNC: 103 MG/DL — HIGH (ref 70–99)
GLUCOSE BLDC GLUCOMTR-MCNC: 112 MG/DL — HIGH (ref 70–99)
GLUCOSE BLDC GLUCOMTR-MCNC: 117 MG/DL — HIGH (ref 70–99)
GLUCOSE BLDC GLUCOMTR-MCNC: 96 MG/DL — SIGNIFICANT CHANGE UP (ref 70–99)
GLUCOSE BLDC GLUCOMTR-MCNC: 96 MG/DL — SIGNIFICANT CHANGE UP (ref 70–99)
GLUCOSE BLDC GLUCOMTR-MCNC: 98 MG/DL — SIGNIFICANT CHANGE UP (ref 70–99)
GLUCOSE SERPL-MCNC: 102 MG/DL — HIGH (ref 70–99)
GLUCOSE SERPL-MCNC: 104 MG/DL — HIGH (ref 70–99)
GLUCOSE SERPL-MCNC: 93 MG/DL — SIGNIFICANT CHANGE UP (ref 70–99)
GLUCOSE SERPL-MCNC: 96 MG/DL — SIGNIFICANT CHANGE UP (ref 70–99)
HCT VFR BLD CALC: 32.3 % — LOW (ref 34.5–45)
HCT VFR BLD CALC: 32.3 % — LOW (ref 34.5–45)
HCT VFR BLD CALC: 32.6 % — LOW (ref 34.5–45)
HCT VFR BLD CALC: 32.7 % — LOW (ref 34.5–45)
HGB BLD-MCNC: 10.8 G/DL — LOW (ref 11.5–15.5)
HGB BLD-MCNC: 10.8 G/DL — LOW (ref 11.5–15.5)
HGB BLD-MCNC: 11 G/DL — LOW (ref 11.5–15.5)
HGB BLD-MCNC: 11.2 G/DL — LOW (ref 11.5–15.5)
IMM GRANULOCYTES NFR BLD AUTO: 0.4 % — SIGNIFICANT CHANGE UP (ref 0–1.5)
IMM GRANULOCYTES NFR BLD AUTO: 0.5 % — SIGNIFICANT CHANGE UP (ref 0–1.5)
IMM GRANULOCYTES NFR BLD AUTO: 0.6 % — SIGNIFICANT CHANGE UP (ref 0–1.5)
IMM GRANULOCYTES NFR BLD AUTO: 0.7 % — SIGNIFICANT CHANGE UP (ref 0–1.5)
INR BLD: 0.88 — SIGNIFICANT CHANGE UP (ref 0.88–1.17)
INR BLD: 0.89 — SIGNIFICANT CHANGE UP (ref 0.88–1.17)
INR BLD: 0.89 — SIGNIFICANT CHANGE UP (ref 0.88–1.17)
INR BLD: 0.91 — SIGNIFICANT CHANGE UP (ref 0.88–1.17)
LDH SERPL L TO P-CCNC: 171 U/L — SIGNIFICANT CHANGE UP (ref 135–225)
LDH SERPL L TO P-CCNC: 176 U/L — SIGNIFICANT CHANGE UP (ref 135–225)
LDH SERPL L TO P-CCNC: 179 U/L — SIGNIFICANT CHANGE UP (ref 135–225)
LDH SERPL L TO P-CCNC: 199 U/L — SIGNIFICANT CHANGE UP (ref 135–225)
LYMPHOCYTES # BLD AUTO: 1.4 K/UL — SIGNIFICANT CHANGE UP (ref 1–3.3)
LYMPHOCYTES # BLD AUTO: 1.51 K/UL — SIGNIFICANT CHANGE UP (ref 1–3.3)
LYMPHOCYTES # BLD AUTO: 1.53 K/UL — SIGNIFICANT CHANGE UP (ref 1–3.3)
LYMPHOCYTES # BLD AUTO: 1.54 K/UL — SIGNIFICANT CHANGE UP (ref 1–3.3)
LYMPHOCYTES # BLD AUTO: 14.8 % — SIGNIFICANT CHANGE UP (ref 13–44)
LYMPHOCYTES # BLD AUTO: 15.5 % — SIGNIFICANT CHANGE UP (ref 13–44)
LYMPHOCYTES # BLD AUTO: 15.7 % — SIGNIFICANT CHANGE UP (ref 13–44)
LYMPHOCYTES # BLD AUTO: 16.5 % — SIGNIFICANT CHANGE UP (ref 13–44)
MAGNESIUM SERPL-MCNC: 6.5 MG/DL — HIGH (ref 1.6–2.6)
MAGNESIUM SERPL-MCNC: 6.5 MG/DL — HIGH (ref 1.6–2.6)
MAGNESIUM SERPL-MCNC: 6.7 MG/DL — HIGH (ref 1.6–2.6)
MAGNESIUM SERPL-MCNC: 6.7 MG/DL — HIGH (ref 1.6–2.6)
MCHC RBC-ENTMCNC: 27.9 PG — SIGNIFICANT CHANGE UP (ref 27–34)
MCHC RBC-ENTMCNC: 28.1 PG — SIGNIFICANT CHANGE UP (ref 27–34)
MCHC RBC-ENTMCNC: 28.3 PG — SIGNIFICANT CHANGE UP (ref 27–34)
MCHC RBC-ENTMCNC: 28.7 PG — SIGNIFICANT CHANGE UP (ref 27–34)
MCHC RBC-ENTMCNC: 33.4 % — SIGNIFICANT CHANGE UP (ref 32–36)
MCHC RBC-ENTMCNC: 33.4 % — SIGNIFICANT CHANGE UP (ref 32–36)
MCHC RBC-ENTMCNC: 33.7 % — SIGNIFICANT CHANGE UP (ref 32–36)
MCHC RBC-ENTMCNC: 34.3 % — SIGNIFICANT CHANGE UP (ref 32–36)
MCV RBC AUTO: 83.5 FL — SIGNIFICANT CHANGE UP (ref 80–100)
MCV RBC AUTO: 83.8 FL — SIGNIFICANT CHANGE UP (ref 80–100)
MCV RBC AUTO: 83.8 FL — SIGNIFICANT CHANGE UP (ref 80–100)
MCV RBC AUTO: 83.9 FL — SIGNIFICANT CHANGE UP (ref 80–100)
MONOCYTES # BLD AUTO: 0.95 K/UL — HIGH (ref 0–0.9)
MONOCYTES # BLD AUTO: 0.99 K/UL — HIGH (ref 0–0.9)
MONOCYTES # BLD AUTO: 1.06 K/UL — HIGH (ref 0–0.9)
MONOCYTES # BLD AUTO: 1.13 K/UL — HIGH (ref 0–0.9)
MONOCYTES NFR BLD AUTO: 10.1 % — SIGNIFICANT CHANGE UP (ref 2–14)
MONOCYTES NFR BLD AUTO: 10.5 % — SIGNIFICANT CHANGE UP (ref 2–14)
MONOCYTES NFR BLD AUTO: 11.1 % — SIGNIFICANT CHANGE UP (ref 2–14)
MONOCYTES NFR BLD AUTO: 11.4 % — SIGNIFICANT CHANGE UP (ref 2–14)
NEUTROPHILS # BLD AUTO: 6.48 K/UL — SIGNIFICANT CHANGE UP (ref 1.8–7.4)
NEUTROPHILS # BLD AUTO: 6.49 K/UL — SIGNIFICANT CHANGE UP (ref 1.8–7.4)
NEUTROPHILS # BLD AUTO: 7.07 K/UL — SIGNIFICANT CHANGE UP (ref 1.8–7.4)
NEUTROPHILS # BLD AUTO: 7.37 K/UL — SIGNIFICANT CHANGE UP (ref 1.8–7.4)
NEUTROPHILS NFR BLD AUTO: 70.1 % — SIGNIFICANT CHANGE UP (ref 43–77)
NEUTROPHILS NFR BLD AUTO: 72 % — SIGNIFICANT CHANGE UP (ref 43–77)
NEUTROPHILS NFR BLD AUTO: 72.4 % — SIGNIFICANT CHANGE UP (ref 43–77)
NEUTROPHILS NFR BLD AUTO: 72.5 % — SIGNIFICANT CHANGE UP (ref 43–77)
NRBC # FLD: 0 K/UL — SIGNIFICANT CHANGE UP (ref 0–0)
PLATELET # BLD AUTO: 234 K/UL — SIGNIFICANT CHANGE UP (ref 150–400)
PLATELET # BLD AUTO: 234 K/UL — SIGNIFICANT CHANGE UP (ref 150–400)
PLATELET # BLD AUTO: 236 K/UL — SIGNIFICANT CHANGE UP (ref 150–400)
PLATELET # BLD AUTO: 244 K/UL — SIGNIFICANT CHANGE UP (ref 150–400)
PMV BLD: 11.4 FL — SIGNIFICANT CHANGE UP (ref 7–13)
PMV BLD: 11.5 FL — SIGNIFICANT CHANGE UP (ref 7–13)
PMV BLD: 11.6 FL — SIGNIFICANT CHANGE UP (ref 7–13)
PMV BLD: 11.7 FL — SIGNIFICANT CHANGE UP (ref 7–13)
POTASSIUM SERPL-MCNC: 4 MMOL/L — SIGNIFICANT CHANGE UP (ref 3.5–5.3)
POTASSIUM SERPL-MCNC: 4.1 MMOL/L — SIGNIFICANT CHANGE UP (ref 3.5–5.3)
POTASSIUM SERPL-MCNC: 4.1 MMOL/L — SIGNIFICANT CHANGE UP (ref 3.5–5.3)
POTASSIUM SERPL-MCNC: 4.2 MMOL/L — SIGNIFICANT CHANGE UP (ref 3.5–5.3)
POTASSIUM SERPL-SCNC: 4 MMOL/L — SIGNIFICANT CHANGE UP (ref 3.5–5.3)
POTASSIUM SERPL-SCNC: 4.1 MMOL/L — SIGNIFICANT CHANGE UP (ref 3.5–5.3)
POTASSIUM SERPL-SCNC: 4.1 MMOL/L — SIGNIFICANT CHANGE UP (ref 3.5–5.3)
POTASSIUM SERPL-SCNC: 4.2 MMOL/L — SIGNIFICANT CHANGE UP (ref 3.5–5.3)
PROT SERPL-MCNC: 7.2 G/DL — SIGNIFICANT CHANGE UP (ref 6–8.3)
PROT SERPL-MCNC: 7.2 G/DL — SIGNIFICANT CHANGE UP (ref 6–8.3)
PROT SERPL-MCNC: 7.5 G/DL — SIGNIFICANT CHANGE UP (ref 6–8.3)
PROT SERPL-MCNC: 7.5 G/DL — SIGNIFICANT CHANGE UP (ref 6–8.3)
PROTHROM AB SERPL-ACNC: 10 SEC — SIGNIFICANT CHANGE UP (ref 9.8–13.1)
PROTHROM AB SERPL-ACNC: 10.1 SEC — SIGNIFICANT CHANGE UP (ref 9.8–13.1)
PROTHROM AB SERPL-ACNC: 10.2 SEC — SIGNIFICANT CHANGE UP (ref 9.8–13.1)
PROTHROM AB SERPL-ACNC: 10.4 SEC — SIGNIFICANT CHANGE UP (ref 9.8–13.1)
RBC # BLD: 3.85 M/UL — SIGNIFICANT CHANGE UP (ref 3.8–5.2)
RBC # BLD: 3.87 M/UL — SIGNIFICANT CHANGE UP (ref 3.8–5.2)
RBC # BLD: 3.89 M/UL — SIGNIFICANT CHANGE UP (ref 3.8–5.2)
RBC # BLD: 3.9 M/UL — SIGNIFICANT CHANGE UP (ref 3.8–5.2)
RBC # FLD: 15.1 % — HIGH (ref 10.3–14.5)
RBC # FLD: 15.1 % — HIGH (ref 10.3–14.5)
RBC # FLD: 15.2 % — HIGH (ref 10.3–14.5)
RBC # FLD: 15.3 % — HIGH (ref 10.3–14.5)
SODIUM SERPL-SCNC: 131 MMOL/L — LOW (ref 135–145)
SODIUM SERPL-SCNC: 132 MMOL/L — LOW (ref 135–145)
SODIUM SERPL-SCNC: 133 MMOL/L — LOW (ref 135–145)
SODIUM SERPL-SCNC: 134 MMOL/L — LOW (ref 135–145)
URATE SERPL-MCNC: 8.4 MG/DL — HIGH (ref 2.5–7)
URATE SERPL-MCNC: 8.9 MG/DL — HIGH (ref 2.5–7)
URATE SERPL-MCNC: 9.1 MG/DL — HIGH (ref 2.5–7)
URATE SERPL-MCNC: 9.7 MG/DL — HIGH (ref 2.5–7)
WBC # BLD: 10.17 K/UL — SIGNIFICANT CHANGE UP (ref 3.8–10.5)
WBC # BLD: 9.02 K/UL — SIGNIFICANT CHANGE UP (ref 3.8–10.5)
WBC # BLD: 9.26 K/UL — SIGNIFICANT CHANGE UP (ref 3.8–10.5)
WBC # BLD: 9.78 K/UL — SIGNIFICANT CHANGE UP (ref 3.8–10.5)
WBC # FLD AUTO: 10.17 K/UL — SIGNIFICANT CHANGE UP (ref 3.8–10.5)
WBC # FLD AUTO: 9.02 K/UL — SIGNIFICANT CHANGE UP (ref 3.8–10.5)
WBC # FLD AUTO: 9.26 K/UL — SIGNIFICANT CHANGE UP (ref 3.8–10.5)
WBC # FLD AUTO: 9.78 K/UL — SIGNIFICANT CHANGE UP (ref 3.8–10.5)

## 2020-04-28 RX ORDER — OXYTOCIN 10 UNIT/ML
2 VIAL (ML) INJECTION
Qty: 30 | Refills: 0 | Status: DISCONTINUED | OUTPATIENT
Start: 2020-04-28 | End: 2020-04-28

## 2020-04-28 RX ORDER — LABETALOL HCL 100 MG
500 TABLET ORAL EVERY 8 HOURS
Refills: 0 | Status: DISCONTINUED | OUTPATIENT
Start: 2020-04-28 | End: 2020-04-29

## 2020-04-28 RX ADMIN — Medication 50 GM/HR: at 07:15

## 2020-04-28 RX ADMIN — SODIUM CHLORIDE 50 MILLILITER(S): 9 INJECTION INTRAMUSCULAR; INTRAVENOUS; SUBCUTANEOUS at 17:38

## 2020-04-28 RX ADMIN — Medication 500 MILLIGRAM(S): at 07:47

## 2020-04-28 RX ADMIN — Medication 30 MILLIGRAM(S): at 21:51

## 2020-04-28 RX ADMIN — Medication 500 MILLIGRAM(S): at 21:50

## 2020-04-28 RX ADMIN — SODIUM CHLORIDE 50 MILLILITER(S): 9 INJECTION, SOLUTION INTRAVENOUS at 13:37

## 2020-04-28 RX ADMIN — Medication 500 MILLIGRAM(S): at 15:45

## 2020-04-28 RX ADMIN — Medication 2 MILLIUNIT(S)/MIN: at 07:37

## 2020-04-28 RX ADMIN — SODIUM CHLORIDE 50 MILLILITER(S): 9 INJECTION INTRAMUSCULAR; INTRAVENOUS; SUBCUTANEOUS at 09:43

## 2020-04-28 NOTE — CHART NOTE - NSCHARTNOTEFT_GEN_A_CORE
Patient seen at bedside   Patient without complaints   Tracing category 1   [place]: 3-4/10   VE: 2.5/50/-3  Patient to restart vaginal cytotec   Reassess as needed   Top off as needed

## 2020-04-28 NOTE — CHART NOTE - NSCHARTNOTEFT_GEN_A_CORE
R1 OB Progress Note    Patient seen and evaluated at bedside.   Denies HA, blurry vision, epigastric pain, n/v.       T(C): 36.8 (04-28-20 @ 11:33), Max: 37.1 (04-27-20 @ 23:21)  HR: 81 (04-28-20 @ 14:41) (68 - 105)  BP: 145/78 (04-28-20 @ 14:40) (106/59 - 177/82)  RR: --  SpO2: 100% (04-28-20 @ 14:41) (90% - 100%)    HELLP labs reviewed  H/H: 10.8/32.3  Plt: 234  Coags: wnl  Cr: rising up to 0.82  AST/ALT: 15/12  LDH: 171  Uric Acid:  9.1     UOP: 240 past hour  SVE: 2.5/50/-3  EFM: 125, mod simone, + accels, - decels JOCELYN   [place]: irregular    A/P 34y P0 admitted for IOL for severe PEC as well as T2DM  -Labor: spPO/cervical balloon. Had VC#2 placed at this time  -Fetus: JOCELYN  -sPEC:   BP currently wnl.   Continue to monitor BP per routine.  Continue Mg for seizure ppx.   Last Mg level   6.7    - within theraputic range.   HELLP labs q6 hours (most recent wnl)- next set at  5p    .  Continue PO labetolol   500TID  and Procardia 30  .  Continue strict I&O's.  UOP currently adequate.    -GBS  neg    Demetris Justice PGY1 R1 OB Progress Note    Patient seen and evaluated at bedside.   Denies HA, blurry vision, epigastric pain, n/v.       T(C): 36.8 (04-28-20 @ 11:33), Max: 37.1 (04-27-20 @ 23:21)  HR: 81 (04-28-20 @ 14:41) (68 - 105)  BP: 145/78 (04-28-20 @ 14:40) (106/59 - 177/82)  RR: --  SpO2: 100% (04-28-20 @ 14:41) (90% - 100%)    HELLP labs reviewed  H/H: 10.8/32.3  Plt: 234  Coags: wnl  Cr: rising up to 0.82  AST/ALT: 15/12  LDH: 171  Uric Acid:  9.1     UOP: 240 past hour  SVE: 2.5/50/-3  EFM: 125, mod simone, + accels, - decels JOCELYN   [place]: irregular  BSUS: vertex    A/P 34y P0 admitted for IOL for severe PEC as well as T2DM  -Labor: spPO/cervical balloon. Had VC#2 placed at this time  -Fetus: JOCELYN  -sPEC:   BP currently wnl.   Continue to monitor BP per routine.  Continue Mg for seizure ppx.   Last Mg level   6.7    - within theraputic range.   HELLP labs q6 hours (most recent wnl)- next set at  5p    .  Continue PO labetolol   500TID  and Procardia 30  .  Continue strict I&O's.  UOP currently adequate.    -GBS  neg    Demetris Justice PGY1

## 2020-04-28 NOTE — OB PROVIDER IHI INDUCTION/AUGMENTATION NOTE - NS_OBIHIINDICATION_OBGYN_ALL_OB
Failureto dilate  Failure to descend  Inadequate uterine contraction/Failure to dilate/Failure to descend/Inadequate uterine contraction

## 2020-04-28 NOTE — CHART NOTE - NSCHARTNOTEFT_GEN_A_CORE
R1 Progress Note    Pt checked for placement of next VCyto.  Pt without complaints. Denies HA/vision changes/RUQ or epigastric pain.    Vital Signs Last 24 Hrs  T(C): 36.7 (28 Apr 2020 15:30), Max: 37.1 (27 Apr 2020 23:21)  T(F): 98.06 (28 Apr 2020 15:30), Max: 98.78 (27 Apr 2020 23:21)  HR: 76 (28 Apr 2020 19:56) (68 - 100)  BP: 155/80 (28 Apr 2020 19:54) (104/52 - 170/80)  SpO2: 99% (28 Apr 2020 19:56) (90% - 100%)    I&O's Detail    27 Apr 2020 07:01  -  28 Apr 2020 07:00  --------------------------------------------------------  IN:    dextrose 5% + sodium chloride 0.9%.: 200 mL    magnesium sulfate  Infusion: 1150 mL    sodium chloride 0.9%.: 950 mL  Total IN: 2300 mL    OUT:    Indwelling Catheter - Urethral: 3660 mL  Total OUT: 3660 mL    Total NET: -1360 mL      28 Apr 2020 07:01  -  28 Apr 2020 20:03  --------------------------------------------------------  IN:    dextrose 5% + sodium chloride 0.9%.: 350 mL    magnesium sulfate  Infusion: 600 mL    sodium chloride 0.9%.: 250 mL  Total IN: 1200 mL    OUT:    Indwelling Catheter - Urethral: 2080 mL  Total OUT: 2080 mL    Total NET: -880 mL    /mod/-accel/-decel  [place] irreg  VE 3.5/50/-3, unchanged               10.8   9.02  )-----------( 236      ( 04-28 @ 17:45 )             32.3     04-28 @ 17:45    134  |  101  |  13  ----------------------------<  104  4.2   |  16  |  0.94    Ca    9.2      04-28 @ 17:45  Mg     6.5     04-28 @ 17:45    TPro  7.2  /  Alb  3.3  /  TBili  0.3  /  DBili  x   /  AST  15  /  ALT  11  /  AlkPhos  181  04-28 @ 17:45    PT/INR - ( 04-28 @ 17:45 )   PT: 10.1 SEC;   INR: 0.89     PTT - ( 04-28 @ 17:45 )  PTT:24.4 SEC    Uric Acid: (04-28 @ 17:45)  9.7      Fibrinogen: (04-28 @ 17:45)  741.0    LDH: (04-28 @ 17:45)  199        #IOL  - VC #3 placed without incident  - s/p PO, CB, pit  - fetus cat 1  - epi in place    #siPEC  - Cr noted to be increased to 0.94  - C/w Magnesium infusion for seizure ppx  - q6 Mg level/HELLP labs  - Monitor BP  - C/w Labetalol 500 TID  - s/p Hydral 10 4/26  - Monitor UOP    #DM2  - C/w q4 FS in latent labor  - rotating fluids    #COVID+ x2 (last confirmed 4/27)  - Monitor VS    #PPX  - SCDs in place, functional    D/w Dr. Pricilla Green PGY-1

## 2020-04-28 NOTE — CHART NOTE - NSCHARTNOTEFT_GEN_A_CORE
R1 Progress Note    Pt checked for cervical change after completing PO course.    Vital Signs Last 24 Hrs  T(C): 37.1 (28 Apr 2020 03:42), Max: 37.1 (27 Apr 2020 23:21)  HR: 85 (28 Apr 2020 05:46) (68 - 105)  BP: 134/92 (28 Apr 2020 05:40) (106/59 - 177/82)  SpO2: 100% (28 Apr 2020 05:51) (89% - 100%)    /mod/discontinuous  [place] q2-4min  VE 3.5/50/-3      #IOL  - 4pit  - s/p PO, CB, VCx1  - readjust monitors  - epi in place    #siPEC  - C/w Magnesium infusion for seizure ppx  - q6 Mg level/HELLP labs  - Monitor BP  - C/w Labetalol 500 TID  - s/p Hydral 10 4/26  - Monitor UOP    #DM2  - C/w q4 FS in latent labor  - rotating fluids    #COVID+ x2 (last confirmed 4/27)  - Monitor VS    #PPX  - SCDs in place, functional    D/w Dr. Steve Green PGY-1

## 2020-04-28 NOTE — OB PROVIDER IHI INDUCTION/AUGMENTATION NOTE - NS_CHECKALL_OBGYN_ALL_OB
Contractions pattern was reviewed/H&P was completed/FHR was reviewed/Order was written/Induction / Augmentation was discussed
Order was written/FHR was reviewed/H&P was completed/Contractions pattern was reviewed/Induction / Augmentation was discussed

## 2020-04-28 NOTE — CHART NOTE - NSCHARTNOTEFT_GEN_A_CORE
R1 Progress Note    Pt checked for removal of CB and confirm presentation on bedside TAUS.   Pt without HA, vision changes, RUQ or epigastric pain. Adequate pain control with epidural.    Vital Signs Last 24 Hrs  T(C): 37.1 (27 Apr 2020 23:21), Max: 37.1 (27 Apr 2020 23:21)  HR: 85 (28 Apr 2020 00:16) (67 - 120)  BP: 144/80 (28 Apr 2020 00:09) (89/42 - 177/82)  SpO2: 98% (28 Apr 2020 00:16) (89% - 99%)    /mod/+accel/-decel  [place] q3-4min  VE 3/50/-3    FS @2130 - 103    #IOL  - CB removed without incident after 24hrs placement  - complete PO course, reassess for potential VC course vs pitocin  - s/p VCx1    #siPEC  - C/w Magnesium infusion for seizure ppx  - q6 Mg level  - Monitor BP  - C/w Labetalol 500 TID  - s/p Hydral 10 4/26  - Monitor UOP    #DM2  - C/w q4 FS in latent labor  - rotating fluids    #PPX  - SCDs in place, functional    D/w Dr. Josh Green PGY-1 R1 Progress Note    Pt checked for removal of CB and confirm presentation on bedside TAUS.   Pt without HA, vision changes, RUQ or epigastric pain. Adequate pain control with epidural.    Vital Signs Last 24 Hrs  T(C): 37.1 (27 Apr 2020 23:21), Max: 37.1 (27 Apr 2020 23:21)  HR: 85 (28 Apr 2020 00:16) (67 - 120)  BP: 144/80 (28 Apr 2020 00:09) (89/42 - 177/82)  SpO2: 98% (28 Apr 2020 00:16) (89% - 99%), RA    /mod/+accel/-decel  [place] q3-4min  VE 3/50/-3    FS @2130 - 103               10.9   9.56  )-----------( 238      ( 04-27 @ 17:00 )             32.1     04-27 @ 17:00    132  |  100  |  8   ----------------------------<  113  4.3   |  18  |  0.71    Ca    8.6      04-27 @ 17:00  Mg     5.8     04-27 @ 17:00    TPro  7.5  /  Alb  3.5  /  TBili  0.2  /  DBili  x   /  AST  18  /  ALT  12  /  AlkPhos  178  04-27 @ 17:00    PT/INR - ( 04-27 @ 18:00 )   PT: 10.5 SEC;   INR: 0.92     PTT - ( 04-27 @ 18:00 )  PTT:26.2 SEC    Uric Acid: (04-27 @ 18:00)  --       Fibrinogen: (04-27 @ 18:00)  652.0    LDH: (04-27 @ 18:00)  --         #IOL  - CB removed without incident after 24hrs placement  - complete PO course, reassess for potential VC course vs pitocin  - s/p VCx1    #siPEC  - C/w Magnesium infusion for seizure ppx  - q6 Mg level/HELLP labs  - Monitor BP  - C/w Labetalol 500 TID  - s/p Hydral 10 4/26  - Monitor UOP    #DM2  - C/w q4 FS in latent labor  - rotating fluids    #COVID+ x2 (last confirmed 4/27)  - Monitor VS    #PPX  - SCDs in place, functional    D/w Dr. Josh Green PGY-1 R1 Progress Note    Pt checked for removal of CB and confirm presentation on bedside TAUS.   Pt without HA, vision changes, RUQ or epigastric pain. Adequate pain control with epidural.    Vital Signs Last 24 Hrs  T(C): 37.1 (27 Apr 2020 23:21), Max: 37.1 (27 Apr 2020 23:21)  HR: 85 (28 Apr 2020 00:16) (67 - 120)  BP: 144/80 (28 Apr 2020 00:09) (89/42 - 177/82)  SpO2: 98% (28 Apr 2020 00:16) (89% - 99%), RA    /mod/+accel/-decel  [place] q3-4min  VE 3/50/-3    FS @2130 - 103               10.9   9.56  )-----------( 238      ( 04-27 @ 17:00 )             32.1     04-27 @ 17:00    132  |  100  |  8   ----------------------------<  113  4.3   |  18  |  0.71    Ca    8.6      04-27 @ 17:00  Mg     5.8     04-27 @ 17:00    TPro  7.5  /  Alb  3.5  /  TBili  0.2  /  DBili  x   /  AST  18  /  ALT  12  /  AlkPhos  178  04-27 @ 17:00    PT/INR - ( 04-27 @ 18:00 )   PT: 10.5 SEC;   INR: 0.92     PTT - ( 04-27 @ 18:00 )  PTT:26.2 SEC    Uric Acid: (04-27 @ 18:00)  --       Fibrinogen: (04-27 @ 18:00)  652.0    LDH: (04-27 @ 18:00)  --         #IOL  - CB removed without incident after 24hrs placement  - complete PO course, reassess for potential VC course vs pitocin  - s/p VCx1  - fetus cat 1  - epi in place    #siPEC  - C/w Magnesium infusion for seizure ppx  - q6 Mg level/HELLP labs  - Monitor BP  - C/w Labetalol 500 TID  - s/p Hydral 10 4/26  - Monitor UOP    #DM2  - C/w q4 FS in latent labor  - rotating fluids    #COVID+ x2 (last confirmed 4/27)  - Monitor VS    #PPX  - SCDs in place, functional    D/w Dr. Josh Green PGY-1 R1 Progress Note    Pt checked for removal of CB and confirm presentation on bedside TAUS.   Pt without HA, vision changes, RUQ or epigastric pain. Adequate pain control with epidural.    Vital Signs Last 24 Hrs  T(C): 37.1 (27 Apr 2020 23:21), Max: 37.1 (27 Apr 2020 23:21)  HR: 85 (28 Apr 2020 00:16) (67 - 120)  BP: 144/80 (28 Apr 2020 00:09) (89/42 - 177/82)  SpO2: 98% (28 Apr 2020 00:16) (89% - 99%), RA    /mod/+accel/-decel  [place] q3-4min  VE 3/50/-3    FS @2130 - 103               10.9   9.56  )-----------( 238      ( 04-27 @ 17:00 )             32.1     04-27 @ 17:00    132  |  100  |  8   ----------------------------<  113  4.3   |  18  |  0.71    Ca    8.6      04-27 @ 17:00  Mg     5.8     04-27 @ 17:00    TPro  7.5  /  Alb  3.5  /  TBili  0.2  /  DBili  x   /  AST  18  /  ALT  12  /  AlkPhos  178  04-27 @ 17:00    PT/INR - ( 04-27 @ 18:00 )   PT: 10.5 SEC;   INR: 0.92     PTT - ( 04-27 @ 18:00 )  PTT:26.2 SEC    Uric Acid: (04-27 @ 18:00)  --       Fibrinogen: (04-27 @ 18:00)  652.0    LDH: (04-27 @ 18:00)  --       TAUS - VTX      #IOL  - CB removed without incident after 24hrs placement  - complete PO course, reassess for potential VC course vs pitocin  - s/p VCx1  - fetus cat 1  - epi in place    #siPEC  - C/w Magnesium infusion for seizure ppx  - q6 Mg level/HELLP labs  - Monitor BP  - C/w Labetalol 500 TID  - s/p Hydral 10 4/26  - Monitor UOP    #DM2  - C/w q4 FS in latent labor  - rotating fluids    #COVID+ x2 (last confirmed 4/27)  - Monitor VS    #PPX  - SCDs in place, functional    D/w Dr. Josh Green PGY-1

## 2020-04-29 ENCOUNTER — TRANSCRIPTION ENCOUNTER (OUTPATIENT)
Age: 35
End: 2020-04-29

## 2020-04-29 ENCOUNTER — APPOINTMENT (OUTPATIENT)
Dept: ANTEPARTUM | Facility: CLINIC | Age: 35
End: 2020-04-29

## 2020-04-29 DIAGNOSIS — O10.013 PRE-EXISTING ESSENTIAL HYPERTENSION COMPLICATING PREGNANCY, THIRD TRIMESTER: ICD-10-CM

## 2020-04-29 DIAGNOSIS — O09.523 SUPERVISION OF ELDERLY MULTIGRAVIDA, THIRD TRIMESTER: ICD-10-CM

## 2020-04-29 DIAGNOSIS — O24.113 PRE-EXISTING TYPE 2 DIABETES MELLITUS, IN PREGNANCY, THIRD TRIMESTER: ICD-10-CM

## 2020-04-29 LAB
ALBUMIN SERPL ELPH-MCNC: 3.5 G/DL — SIGNIFICANT CHANGE UP (ref 3.3–5)
ALBUMIN SERPL ELPH-MCNC: 3.5 G/DL — SIGNIFICANT CHANGE UP (ref 3.3–5)
ALP SERPL-CCNC: 174 U/L — HIGH (ref 40–120)
ALP SERPL-CCNC: 194 U/L — HIGH (ref 40–120)
ALT FLD-CCNC: 12 U/L — SIGNIFICANT CHANGE UP (ref 4–33)
ALT FLD-CCNC: 13 U/L — SIGNIFICANT CHANGE UP (ref 4–33)
ANION GAP SERPL CALC-SCNC: 15 MMO/L — HIGH (ref 7–14)
ANION GAP SERPL CALC-SCNC: 17 MMO/L — HIGH (ref 7–14)
APTT BLD: 25.9 SEC — LOW (ref 27.5–36.3)
APTT BLD: 27.5 SEC — SIGNIFICANT CHANGE UP (ref 27.5–36.3)
AST SERPL-CCNC: 15 U/L — SIGNIFICANT CHANGE UP (ref 4–32)
AST SERPL-CCNC: 15 U/L — SIGNIFICANT CHANGE UP (ref 4–32)
BASOPHILS # BLD AUTO: 0.02 K/UL — SIGNIFICANT CHANGE UP (ref 0–0.2)
BASOPHILS # BLD AUTO: 0.03 K/UL — SIGNIFICANT CHANGE UP (ref 0–0.2)
BASOPHILS NFR BLD AUTO: 0.2 % — SIGNIFICANT CHANGE UP (ref 0–2)
BASOPHILS NFR BLD AUTO: 0.4 % — SIGNIFICANT CHANGE UP (ref 0–2)
BILIRUB SERPL-MCNC: 0.3 MG/DL — SIGNIFICANT CHANGE UP (ref 0.2–1.2)
BILIRUB SERPL-MCNC: 0.4 MG/DL — SIGNIFICANT CHANGE UP (ref 0.2–1.2)
BUN SERPL-MCNC: 13 MG/DL — SIGNIFICANT CHANGE UP (ref 7–23)
BUN SERPL-MCNC: 15 MG/DL — SIGNIFICANT CHANGE UP (ref 7–23)
CALCIUM SERPL-MCNC: 8.9 MG/DL — SIGNIFICANT CHANGE UP (ref 8.4–10.5)
CALCIUM SERPL-MCNC: 8.9 MG/DL — SIGNIFICANT CHANGE UP (ref 8.4–10.5)
CHLORIDE SERPL-SCNC: 100 MMOL/L — SIGNIFICANT CHANGE UP (ref 98–107)
CHLORIDE SERPL-SCNC: 101 MMOL/L — SIGNIFICANT CHANGE UP (ref 98–107)
CO2 SERPL-SCNC: 17 MMOL/L — LOW (ref 22–31)
CO2 SERPL-SCNC: 17 MMOL/L — LOW (ref 22–31)
CREAT SERPL-MCNC: 0.85 MG/DL — SIGNIFICANT CHANGE UP (ref 0.5–1.3)
CREAT SERPL-MCNC: 1.03 MG/DL — SIGNIFICANT CHANGE UP (ref 0.5–1.3)
EOSINOPHIL # BLD AUTO: 0.14 K/UL — SIGNIFICANT CHANGE UP (ref 0–0.5)
EOSINOPHIL # BLD AUTO: 0.15 K/UL — SIGNIFICANT CHANGE UP (ref 0–0.5)
EOSINOPHIL NFR BLD AUTO: 1.6 % — SIGNIFICANT CHANGE UP (ref 0–6)
EOSINOPHIL NFR BLD AUTO: 1.8 % — SIGNIFICANT CHANGE UP (ref 0–6)
FIBRINOGEN PPP-MCNC: 695 MG/DL — HIGH (ref 300–520)
FIBRINOGEN PPP-MCNC: 771 MG/DL — HIGH (ref 300–520)
GLUCOSE BLDC GLUCOMTR-MCNC: 105 MG/DL — HIGH (ref 70–99)
GLUCOSE BLDC GLUCOMTR-MCNC: 107 MG/DL — HIGH (ref 70–99)
GLUCOSE BLDC GLUCOMTR-MCNC: 122 MG/DL — HIGH (ref 70–99)
GLUCOSE BLDC GLUCOMTR-MCNC: 91 MG/DL — SIGNIFICANT CHANGE UP (ref 70–99)
GLUCOSE SERPL-MCNC: 87 MG/DL — SIGNIFICANT CHANGE UP (ref 70–99)
GLUCOSE SERPL-MCNC: 96 MG/DL — SIGNIFICANT CHANGE UP (ref 70–99)
HCT VFR BLD CALC: 31.3 % — LOW (ref 34.5–45)
HCT VFR BLD CALC: 33.2 % — LOW (ref 34.5–45)
HGB BLD-MCNC: 10.7 G/DL — LOW (ref 11.5–15.5)
HGB BLD-MCNC: 11.4 G/DL — LOW (ref 11.5–15.5)
IMM GRANULOCYTES NFR BLD AUTO: 0.5 % — SIGNIFICANT CHANGE UP (ref 0–1.5)
IMM GRANULOCYTES NFR BLD AUTO: 0.5 % — SIGNIFICANT CHANGE UP (ref 0–1.5)
INR BLD: 0.89 — SIGNIFICANT CHANGE UP (ref 0.88–1.17)
INR BLD: 0.9 — SIGNIFICANT CHANGE UP (ref 0.88–1.17)
LDH SERPL L TO P-CCNC: 174 U/L — SIGNIFICANT CHANGE UP (ref 135–225)
LDH SERPL L TO P-CCNC: 177 U/L — SIGNIFICANT CHANGE UP (ref 135–225)
LYMPHOCYTES # BLD AUTO: 1.45 K/UL — SIGNIFICANT CHANGE UP (ref 1–3.3)
LYMPHOCYTES # BLD AUTO: 1.6 K/UL — SIGNIFICANT CHANGE UP (ref 1–3.3)
LYMPHOCYTES # BLD AUTO: 17.2 % — SIGNIFICANT CHANGE UP (ref 13–44)
LYMPHOCYTES # BLD AUTO: 18.6 % — SIGNIFICANT CHANGE UP (ref 13–44)
MAGNESIUM SERPL-MCNC: 4.7 MG/DL — HIGH (ref 1.6–2.6)
MAGNESIUM SERPL-MCNC: 5.7 MG/DL — HIGH (ref 1.6–2.6)
MAGNESIUM SERPL-MCNC: 6.9 MG/DL — HIGH (ref 1.6–2.6)
MAGNESIUM SERPL-MCNC: 7.1 MG/DL — CRITICAL HIGH (ref 1.6–2.6)
MCHC RBC-ENTMCNC: 28.9 PG — SIGNIFICANT CHANGE UP (ref 27–34)
MCHC RBC-ENTMCNC: 28.9 PG — SIGNIFICANT CHANGE UP (ref 27–34)
MCHC RBC-ENTMCNC: 34.2 % — SIGNIFICANT CHANGE UP (ref 32–36)
MCHC RBC-ENTMCNC: 34.3 % — SIGNIFICANT CHANGE UP (ref 32–36)
MCV RBC AUTO: 84.1 FL — SIGNIFICANT CHANGE UP (ref 80–100)
MCV RBC AUTO: 84.6 FL — SIGNIFICANT CHANGE UP (ref 80–100)
MONOCYTES # BLD AUTO: 0.92 K/UL — HIGH (ref 0–0.9)
MONOCYTES # BLD AUTO: 1.17 K/UL — HIGH (ref 0–0.9)
MONOCYTES NFR BLD AUTO: 10.7 % — SIGNIFICANT CHANGE UP (ref 2–14)
MONOCYTES NFR BLD AUTO: 13.9 % — SIGNIFICANT CHANGE UP (ref 2–14)
NEUTROPHILS # BLD AUTO: 5.58 K/UL — SIGNIFICANT CHANGE UP (ref 1.8–7.4)
NEUTROPHILS # BLD AUTO: 5.88 K/UL — SIGNIFICANT CHANGE UP (ref 1.8–7.4)
NEUTROPHILS NFR BLD AUTO: 66.2 % — SIGNIFICANT CHANGE UP (ref 43–77)
NEUTROPHILS NFR BLD AUTO: 68.4 % — SIGNIFICANT CHANGE UP (ref 43–77)
NRBC # FLD: 0 K/UL — SIGNIFICANT CHANGE UP (ref 0–0)
NRBC # FLD: 0 K/UL — SIGNIFICANT CHANGE UP (ref 0–0)
PLATELET # BLD AUTO: 236 K/UL — SIGNIFICANT CHANGE UP (ref 150–400)
PLATELET # BLD AUTO: 250 K/UL — SIGNIFICANT CHANGE UP (ref 150–400)
PMV BLD: 11.8 FL — SIGNIFICANT CHANGE UP (ref 7–13)
PMV BLD: 11.9 FL — SIGNIFICANT CHANGE UP (ref 7–13)
POTASSIUM SERPL-MCNC: 4.3 MMOL/L — SIGNIFICANT CHANGE UP (ref 3.5–5.3)
POTASSIUM SERPL-MCNC: 4.4 MMOL/L — SIGNIFICANT CHANGE UP (ref 3.5–5.3)
POTASSIUM SERPL-SCNC: 4.3 MMOL/L — SIGNIFICANT CHANGE UP (ref 3.5–5.3)
POTASSIUM SERPL-SCNC: 4.4 MMOL/L — SIGNIFICANT CHANGE UP (ref 3.5–5.3)
PROT SERPL-MCNC: 7.3 G/DL — SIGNIFICANT CHANGE UP (ref 6–8.3)
PROT SERPL-MCNC: 7.7 G/DL — SIGNIFICANT CHANGE UP (ref 6–8.3)
PROTHROM AB SERPL-ACNC: 10.1 SEC — SIGNIFICANT CHANGE UP (ref 9.8–13.1)
PROTHROM AB SERPL-ACNC: 10.2 SEC — SIGNIFICANT CHANGE UP (ref 9.8–13.1)
RBC # BLD: 3.7 M/UL — LOW (ref 3.8–5.2)
RBC # BLD: 3.95 M/UL — SIGNIFICANT CHANGE UP (ref 3.8–5.2)
RBC # FLD: 15.1 % — HIGH (ref 10.3–14.5)
RBC # FLD: 15.4 % — HIGH (ref 10.3–14.5)
SODIUM SERPL-SCNC: 133 MMOL/L — LOW (ref 135–145)
SODIUM SERPL-SCNC: 134 MMOL/L — LOW (ref 135–145)
URATE SERPL-MCNC: 10.4 MG/DL — HIGH (ref 2.5–7)
URATE SERPL-MCNC: 9.6 MG/DL — HIGH (ref 2.5–7)
WBC # BLD: 8.42 K/UL — SIGNIFICANT CHANGE UP (ref 3.8–10.5)
WBC # BLD: 8.6 K/UL — SIGNIFICANT CHANGE UP (ref 3.8–10.5)
WBC # FLD AUTO: 8.42 K/UL — SIGNIFICANT CHANGE UP (ref 3.8–10.5)
WBC # FLD AUTO: 8.6 K/UL — SIGNIFICANT CHANGE UP (ref 3.8–10.5)

## 2020-04-29 RX ORDER — DEXTROSE 50 % IN WATER 50 %
25 SYRINGE (ML) INTRAVENOUS ONCE
Refills: 0 | Status: DISCONTINUED | OUTPATIENT
Start: 2020-04-29 | End: 2020-05-01

## 2020-04-29 RX ORDER — TETANUS TOXOID, REDUCED DIPHTHERIA TOXOID AND ACELLULAR PERTUSSIS VACCINE, ADSORBED 5; 2.5; 8; 8; 2.5 [IU]/.5ML; [IU]/.5ML; UG/.5ML; UG/.5ML; UG/.5ML
0.5 SUSPENSION INTRAMUSCULAR ONCE
Refills: 0 | Status: DISCONTINUED | OUTPATIENT
Start: 2020-04-29 | End: 2020-05-01

## 2020-04-29 RX ORDER — SODIUM CHLORIDE 9 MG/ML
1000 INJECTION, SOLUTION INTRAVENOUS
Refills: 0 | Status: DISCONTINUED | OUTPATIENT
Start: 2020-04-29 | End: 2020-04-30

## 2020-04-29 RX ORDER — HYDROMORPHONE HYDROCHLORIDE 2 MG/ML
1 INJECTION INTRAMUSCULAR; INTRAVENOUS; SUBCUTANEOUS
Refills: 0 | Status: DISCONTINUED | OUTPATIENT
Start: 2020-04-29 | End: 2020-04-29

## 2020-04-29 RX ORDER — OXYCODONE HYDROCHLORIDE 5 MG/1
5 TABLET ORAL ONCE
Refills: 0 | Status: DISCONTINUED | OUTPATIENT
Start: 2020-04-29 | End: 2020-05-01

## 2020-04-29 RX ORDER — INSULIN LISPRO 100/ML
VIAL (ML) SUBCUTANEOUS AT BEDTIME
Refills: 0 | Status: DISCONTINUED | OUTPATIENT
Start: 2020-04-29 | End: 2020-04-29

## 2020-04-29 RX ORDER — INSULIN LISPRO 100/ML
VIAL (ML) SUBCUTANEOUS
Refills: 0 | Status: DISCONTINUED | OUTPATIENT
Start: 2020-04-29 | End: 2020-04-29

## 2020-04-29 RX ORDER — FAMOTIDINE 10 MG/ML
20 INJECTION INTRAVENOUS
Refills: 0 | Status: DISCONTINUED | OUTPATIENT
Start: 2020-04-29 | End: 2020-05-01

## 2020-04-29 RX ORDER — DEXTROSE 50 % IN WATER 50 %
12.5 SYRINGE (ML) INTRAVENOUS ONCE
Refills: 0 | Status: DISCONTINUED | OUTPATIENT
Start: 2020-04-29 | End: 2020-05-01

## 2020-04-29 RX ORDER — MAGNESIUM HYDROXIDE 400 MG/1
30 TABLET, CHEWABLE ORAL
Refills: 0 | Status: DISCONTINUED | OUTPATIENT
Start: 2020-04-29 | End: 2020-05-01

## 2020-04-29 RX ORDER — DEXTROSE 50 % IN WATER 50 %
15 SYRINGE (ML) INTRAVENOUS ONCE
Refills: 0 | Status: DISCONTINUED | OUTPATIENT
Start: 2020-04-29 | End: 2020-05-01

## 2020-04-29 RX ORDER — LANOLIN
1 OINTMENT (GRAM) TOPICAL EVERY 6 HOURS
Refills: 0 | Status: DISCONTINUED | OUTPATIENT
Start: 2020-04-29 | End: 2020-05-01

## 2020-04-29 RX ORDER — MAGNESIUM SULFATE 500 MG/ML
1.5 VIAL (ML) INJECTION
Qty: 40 | Refills: 0 | Status: DISCONTINUED | OUTPATIENT
Start: 2020-04-29 | End: 2020-04-29

## 2020-04-29 RX ORDER — GLUCAGON INJECTION, SOLUTION 0.5 MG/.1ML
1 INJECTION, SOLUTION SUBCUTANEOUS ONCE
Refills: 0 | Status: DISCONTINUED | OUTPATIENT
Start: 2020-04-29 | End: 2020-05-01

## 2020-04-29 RX ORDER — CEFAZOLIN SODIUM 1 G
2000 VIAL (EA) INJECTION EVERY 8 HOURS
Refills: 0 | Status: DISCONTINUED | OUTPATIENT
Start: 2020-04-29 | End: 2020-05-01

## 2020-04-29 RX ORDER — HEPARIN SODIUM 5000 [USP'U]/ML
10000 INJECTION INTRAVENOUS; SUBCUTANEOUS EVERY 12 HOURS
Refills: 0 | Status: DISCONTINUED | OUTPATIENT
Start: 2020-04-29 | End: 2020-05-01

## 2020-04-29 RX ORDER — OXYTOCIN 10 UNIT/ML
62.5 VIAL (ML) INJECTION
Qty: 20 | Refills: 0 | Status: DISCONTINUED | OUTPATIENT
Start: 2020-04-29 | End: 2020-04-29

## 2020-04-29 RX ORDER — KETOROLAC TROMETHAMINE 30 MG/ML
30 SYRINGE (ML) INJECTION EVERY 6 HOURS
Refills: 0 | Status: DISCONTINUED | OUTPATIENT
Start: 2020-04-29 | End: 2020-04-30

## 2020-04-29 RX ORDER — OXYTOCIN 10 UNIT/ML
2 VIAL (ML) INJECTION
Qty: 30 | Refills: 0 | Status: DISCONTINUED | OUTPATIENT
Start: 2020-04-29 | End: 2020-04-29

## 2020-04-29 RX ORDER — LABETALOL HCL 100 MG
500 TABLET ORAL EVERY 12 HOURS
Refills: 0 | Status: DISCONTINUED | OUTPATIENT
Start: 2020-04-29 | End: 2020-05-01

## 2020-04-29 RX ORDER — METFORMIN HYDROCHLORIDE 850 MG/1
500 TABLET ORAL EVERY 12 HOURS
Refills: 0 | Status: DISCONTINUED | OUTPATIENT
Start: 2020-04-29 | End: 2020-05-01

## 2020-04-29 RX ORDER — IBUPROFEN 200 MG
600 TABLET ORAL EVERY 6 HOURS
Refills: 0 | Status: DISCONTINUED | OUTPATIENT
Start: 2020-04-29 | End: 2020-05-01

## 2020-04-29 RX ORDER — MAGNESIUM SULFATE 500 MG/ML
1.5 VIAL (ML) INJECTION
Qty: 40 | Refills: 0 | Status: DISCONTINUED | OUTPATIENT
Start: 2020-04-29 | End: 2020-04-30

## 2020-04-29 RX ORDER — OXYTOCIN 10 UNIT/ML
333.33 VIAL (ML) INJECTION
Qty: 20 | Refills: 0 | Status: DISCONTINUED | OUTPATIENT
Start: 2020-04-29 | End: 2020-04-29

## 2020-04-29 RX ORDER — OXYTOCIN 10 UNIT/ML
20.83 VIAL (ML) INJECTION
Qty: 20 | Refills: 0 | Status: DISCONTINUED | OUTPATIENT
Start: 2020-04-29 | End: 2020-05-01

## 2020-04-29 RX ORDER — SODIUM CHLORIDE 9 MG/ML
1000 INJECTION, SOLUTION INTRAVENOUS
Refills: 0 | Status: DISCONTINUED | OUTPATIENT
Start: 2020-04-29 | End: 2020-05-01

## 2020-04-29 RX ORDER — NIFEDIPINE 30 MG
30 TABLET, EXTENDED RELEASE 24 HR ORAL AT BEDTIME
Refills: 0 | Status: DISCONTINUED | OUTPATIENT
Start: 2020-04-29 | End: 2020-05-01

## 2020-04-29 RX ORDER — OXYCODONE HYDROCHLORIDE 5 MG/1
5 TABLET ORAL
Refills: 0 | Status: DISCONTINUED | OUTPATIENT
Start: 2020-04-29 | End: 2020-05-01

## 2020-04-29 RX ORDER — OXYCODONE HYDROCHLORIDE 5 MG/1
5 TABLET ORAL
Refills: 0 | Status: COMPLETED | OUTPATIENT
Start: 2020-04-29 | End: 2020-05-06

## 2020-04-29 RX ORDER — CALCIUM CARBONATE 500(1250)
1 TABLET ORAL THREE TIMES A DAY
Refills: 0 | Status: DISCONTINUED | OUTPATIENT
Start: 2020-04-29 | End: 2020-05-01

## 2020-04-29 RX ORDER — ALBUTEROL 90 UG/1
2 AEROSOL, METERED ORAL EVERY 6 HOURS
Refills: 0 | Status: DISCONTINUED | OUTPATIENT
Start: 2020-04-29 | End: 2020-05-01

## 2020-04-29 RX ORDER — SIMETHICONE 80 MG/1
80 TABLET, CHEWABLE ORAL EVERY 4 HOURS
Refills: 0 | Status: DISCONTINUED | OUTPATIENT
Start: 2020-04-29 | End: 2020-05-01

## 2020-04-29 RX ORDER — ACETAMINOPHEN 500 MG
975 TABLET ORAL
Refills: 0 | Status: DISCONTINUED | OUTPATIENT
Start: 2020-04-29 | End: 2020-05-01

## 2020-04-29 RX ORDER — DIPHENHYDRAMINE HCL 50 MG
25 CAPSULE ORAL EVERY 6 HOURS
Refills: 0 | Status: DISCONTINUED | OUTPATIENT
Start: 2020-04-29 | End: 2020-05-01

## 2020-04-29 RX ADMIN — Medication 100 MILLIGRAM(S): at 15:58

## 2020-04-29 RX ADMIN — OXYCODONE HYDROCHLORIDE 5 MILLIGRAM(S): 5 TABLET ORAL at 17:45

## 2020-04-29 RX ADMIN — SODIUM CHLORIDE 62.5 MILLILITER(S): 9 INJECTION, SOLUTION INTRAVENOUS at 03:24

## 2020-04-29 RX ADMIN — Medication 2 MILLIUNIT(S)/MIN: at 04:37

## 2020-04-29 RX ADMIN — FAMOTIDINE 20 MILLIGRAM(S): 10 INJECTION INTRAVENOUS at 20:09

## 2020-04-29 RX ADMIN — Medication 37.5 GM/HR: at 03:23

## 2020-04-29 RX ADMIN — Medication 500 MILLIGRAM(S): at 18:46

## 2020-04-29 RX ADMIN — Medication 500 MILLIGRAM(S): at 06:18

## 2020-04-29 RX ADMIN — Medication 30 MILLIGRAM(S): at 22:16

## 2020-04-29 RX ADMIN — HEPARIN SODIUM 10000 UNIT(S): 5000 INJECTION INTRAVENOUS; SUBCUTANEOUS at 18:47

## 2020-04-29 RX ADMIN — Medication 975 MILLIGRAM(S): at 16:53

## 2020-04-29 RX ADMIN — Medication 37.5 GM/HR: at 19:24

## 2020-04-29 RX ADMIN — Medication 62.5 MILLIUNIT(S)/MIN: at 11:39

## 2020-04-29 RX ADMIN — Medication 30 MILLIGRAM(S): at 16:53

## 2020-04-29 RX ADMIN — OXYCODONE HYDROCHLORIDE 5 MILLIGRAM(S): 5 TABLET ORAL at 20:09

## 2020-04-29 RX ADMIN — METFORMIN HYDROCHLORIDE 500 MILLIGRAM(S): 850 TABLET ORAL at 18:47

## 2020-04-29 RX ADMIN — Medication 100 MILLIGRAM(S): at 22:16

## 2020-04-29 RX ADMIN — SIMETHICONE 80 MILLIGRAM(S): 80 TABLET, CHEWABLE ORAL at 20:09

## 2020-04-29 RX ADMIN — Medication 37.5 GM/HR: at 14:40

## 2020-04-29 RX ADMIN — Medication 37.5 GM/HR: at 08:46

## 2020-04-29 RX ADMIN — SODIUM CHLORIDE 62.5 MILLILITER(S): 9 INJECTION, SOLUTION INTRAVENOUS at 19:24

## 2020-04-29 RX ADMIN — SODIUM CHLORIDE 62.5 MILLILITER(S): 9 INJECTION INTRAMUSCULAR; INTRAVENOUS; SUBCUTANEOUS at 03:24

## 2020-04-29 RX ADMIN — HYDROMORPHONE HYDROCHLORIDE 1 MILLIGRAM(S): 2 INJECTION INTRAMUSCULAR; INTRAVENOUS; SUBCUTANEOUS at 09:42

## 2020-04-29 NOTE — CHART NOTE - NSCHARTNOTEFT_GEN_A_CORE
Patient seen at bedside   Tracing category 1   [place]: 2-3/10   VE: unchanged   AROMEd clear   Pitocin at 4   Continue to titrate pit   Top as needed

## 2020-04-29 NOTE — DISCHARGE NOTE OB - CARE PROVIDER_API CALL
Andrew Ahumada)  Obstetrics and Gynecology Obstetrics and Gynocology  [address] [place] [address]  Phone: [phone]  Fax: [phone]  Follow Up Time:

## 2020-04-29 NOTE — OB PROVIDER DELIVERY SUMMARY - NSPROVIDERDELIVERYNOTE_OBGYN_ALL_OB_FT
Vacuum-assisted delivery of fetal head, Nuchal cord x 2 reduced at delivery. Apgars 6/8  Uterus, bilateral fallopian tubes, ovaries grossly wnl. ~2cm left lateral extension downward, reapproximated without incident.  Fibrillar placed on hysterotomy.     EBL 800cc  UOP 250cc  IVF 2500cc stat PCS for fetal bradycardia,   Vacuum-assisted delivery of fetal head, Nuchal cord x 2 reduced at delivery. Apgars 6/8  Uterus, bilateral fallopian tubes, ovaries grossly wnl. ~2cm left lateral extension downward, reapproximated without incidence   Fibrillar placed on hysterotomy.     EBL 800cc  UOP 250cc  IVF 2500cc

## 2020-04-29 NOTE — DISCHARGE NOTE OB - PATIENT PORTAL LINK FT
You can access the FollowMyHealth Patient Portal offered by [facility] by registering at the following website: [email or web address]. By joining [facility]’s FollowMyHealth portal, you will also be able to view your health information using other applications (apps) compatible with our system.

## 2020-04-29 NOTE — OB NEONATOLOGY/PEDIATRICIAN DELIVERY SUMMARY - NSPEDSNEONOTESA_OBGYN_ALL_OB_FT
Baby is a 37.4 week GA _male born to a 33 y/o  mother via crash  with vacuum for fetal iron. IOL for pre-eclampsia, polyhydramnios, unstable lie. Mother on mag. Maternal history of chronic HTN, DM type 2, PCOS, sickle cell trait, HSV (no active lesions), asthma, Chlamydia treated in  with test of cure. Pregnancy complicated by GDMA 2. Maternal blood type O+. Prenatal labs negative, nonreactive and immune. COVID +. GBS negative on . AROM <18hrs with clear fluid. Baby born with poor tone and respiratory effort (gasping), suctioned and started on CPAP 5/20 at 1 MOL and quickly improved respiratory status so CPAP discontinued after approx 1 min. Good oxygen saturations noted on pulse ox. Warmed, dried, stimulated. Apgars 6 / 8 (tone and resp). Nuchal x2 with true knot. EOS 0.11. Mother choosing to breastfeed. She consents to Hep B and circ.

## 2020-04-29 NOTE — DISCHARGE NOTE OB - CARE PLAN
Principal Discharge DX:	 delivery delivered  Goal:	recovery  Assessment and plan of treatment:	routine care

## 2020-04-29 NOTE — OB RN DELIVERY SUMMARY - NS_SEPSISRSKCALC_OBGYN_ALL_OB_FT
EOS calculated successfully. EOS Risk Factor: 0.5/1000 live births ([facility] national incidence); GA=37w4d; Temp=98.78; ROM=1.217; GBS='Negative'; Antibiotics='No antibiotics or any antibiotics < 2 hrs prior to birth'

## 2020-04-29 NOTE — CHART NOTE - NSCHARTNOTEFT_GEN_A_CORE
R2 Progress Note    Patient seen and examined at bedside for placement of vaginal cytotec. 25mcg placed in the posterior fornix without difficulty.    After placement of vaginal cytotec, patient expressed frustration re: length of induction. Discussed possibility of elective  section, patient was uncertain at this time.     NAD  Vital Signs Last 24 Hrs  T(C): 37.0 (2020 23:00), Max: 37.1 (2020 03:42)  T(F): 98.6 (2020 23:00), Max: 98.78 (2020 03:42)  HR: 71 (2020 00:32) (65 - 150)  BP: 125/58 (2020 00:24) (104/52 - 155/80)  BP(mean): --  RR: --  SpO2: 98% (2020 00:32) (90% - 100%)    SVE 4.5/50/-3, moderate tone  /mod variability/+accels/-decels  TOCO ctx q6m  BSS vtx    POCT Blood Glucose.: 96: RN Notified Readback mg/dL (20 @ 21:54)  POCT Blood Glucose.: 103: RN Notified Readback mg/dL (20 @ 17:36)      A/P 34y  at 37w3d IOL siPEC, T2DM     - labor: patient making slow change. However, prolonged induction is causing significant emotional distress at this time. Discussion of elective  section was broached--patient unsure at this time. Patient counseled that in 4h, at the time of next cytotec dosing, will discuss plan including possible pitocin if patient desires to continue induction. Patient agrees with plan.  - fetus: cat 1 FHT, cont toco/efm  - gbs: neg  - pain: no complaints  - covid: pos  - dm2: euglycemic. C/w rotating fluids and fsg per protocol  - siPEC: c/w Mg for seizure ppx. Mg level and repeat HELLP labs pending. Patient with noted blood in urine and uptrending Cr to 0.94 (baseline 0.61). UOP remains adequate. Will continue to monitor closely.     d/w L[place] PGY4  Marcela Rhodes PGY2  [id number]

## 2020-04-29 NOTE — PROVIDER CONTACT NOTE (CRITICAL VALUE NOTIFICATION) - SITUATION
@ 37.4 weeks, IOL for sPEC, on Magnesium Sulfate infusion for neuroprotection & blood pressure management

## 2020-04-29 NOTE — OB NEONATOLOGY/PEDIATRICIAN DELIVERY SUMMARY - NS_MATERNALCONCERNS_OBGYN_ALL_OB_FT
Patient is a 35yo G1 at 37w1d with h/o asthma, cHTN, T2DM and COVID infection presenting for scheduled induction due to super-imposed PEC with polyhydramnios and unstable fetal lie. Patient states on Thursday she had an ultrasound in which fetus was vertex and EMA was 26. Patient has cHTN for which she has been taking labetalol 500 BID (10AM/PM), labetalol 400 (4PM), procardia XL 30mg (10PM), however she was diagnosed with super-imposed PEC during pregnancy due to an elevated 24hr urine protein collection (322mg, 350mg on two occasions). Patient was diagnosed with T2DM 2 years ago and glucose levels were well-controlled with lifestyle changes. However, she has been taking metformin 500mg BID for PCOS. Patient had symptomatic COVID infection on 3/26 but has been asymptomatic for over 2 weeks.       Pgyn: +HSV (last outbreak 2019), chlamydia infection s/p treatment with test of cure 2019, denies h/o fibroids  PMH: T2DM, cHTN, asthma  PSH: gastric sleeve 4/2018, rotator cuff repair 6/2019  Meds: labetalol 500mg BID (10AM/PM) and 400mg once a day (4PM), procardia XL 30 (10PM), metformin 500mg BID, famotidine, zyrtec  All: NKDA  Social: denies alcohol, drug, tobacco use in pregnancy

## 2020-04-30 ENCOUNTER — APPOINTMENT (OUTPATIENT)
Dept: ANTEPARTUM | Facility: HOSPITAL | Age: 35
End: 2020-04-30

## 2020-04-30 ENCOUNTER — APPOINTMENT (OUTPATIENT)
Dept: ANTEPARTUM | Facility: CLINIC | Age: 35
End: 2020-04-30

## 2020-04-30 LAB
ALBUMIN SERPL ELPH-MCNC: 2.8 G/DL — LOW (ref 3.3–5)
ALP SERPL-CCNC: 131 U/L — HIGH (ref 40–120)
ALT FLD-CCNC: 14 U/L — SIGNIFICANT CHANGE UP (ref 4–33)
ANION GAP SERPL CALC-SCNC: 11 MMO/L — SIGNIFICANT CHANGE UP (ref 7–14)
APTT BLD: 24.1 SEC — LOW (ref 27.5–36.3)
AST SERPL-CCNC: 31 U/L — SIGNIFICANT CHANGE UP (ref 4–32)
BASOPHILS # BLD AUTO: 0.01 K/UL — SIGNIFICANT CHANGE UP (ref 0–0.2)
BASOPHILS NFR BLD AUTO: 0.1 % — SIGNIFICANT CHANGE UP (ref 0–2)
BILIRUB SERPL-MCNC: < 0.2 MG/DL — LOW (ref 0.2–1.2)
BUN SERPL-MCNC: 12 MG/DL — SIGNIFICANT CHANGE UP (ref 7–23)
CALCIUM SERPL-MCNC: 8.6 MG/DL — SIGNIFICANT CHANGE UP (ref 8.4–10.5)
CHLORIDE SERPL-SCNC: 102 MMOL/L — SIGNIFICANT CHANGE UP (ref 98–107)
CO2 SERPL-SCNC: 21 MMOL/L — LOW (ref 22–31)
CREAT SERPL-MCNC: 0.86 MG/DL — SIGNIFICANT CHANGE UP (ref 0.5–1.3)
EOSINOPHIL # BLD AUTO: 0.16 K/UL — SIGNIFICANT CHANGE UP (ref 0–0.5)
EOSINOPHIL NFR BLD AUTO: 1.7 % — SIGNIFICANT CHANGE UP (ref 0–6)
FIBRINOGEN PPP-MCNC: 679 MG/DL — HIGH (ref 300–520)
GLUCOSE BLDC GLUCOMTR-MCNC: 103 MG/DL — HIGH (ref 70–99)
GLUCOSE BLDC GLUCOMTR-MCNC: 92 MG/DL — SIGNIFICANT CHANGE UP (ref 70–99)
GLUCOSE BLDC GLUCOMTR-MCNC: 94 MG/DL — SIGNIFICANT CHANGE UP (ref 70–99)
GLUCOSE BLDC GLUCOMTR-MCNC: 97 MG/DL — SIGNIFICANT CHANGE UP (ref 70–99)
GLUCOSE SERPL-MCNC: 92 MG/DL — SIGNIFICANT CHANGE UP (ref 70–99)
HBA1C BLD-MCNC: 5.3 % — SIGNIFICANT CHANGE UP (ref 4–5.6)
HCT VFR BLD CALC: 24.5 % — LOW (ref 34.5–45)
HGB BLD-MCNC: 8.2 G/DL — LOW (ref 11.5–15.5)
IMM GRANULOCYTES NFR BLD AUTO: 0.5 % — SIGNIFICANT CHANGE UP (ref 0–1.5)
INR BLD: 0.92 — SIGNIFICANT CHANGE UP (ref 0.88–1.17)
LDH SERPL L TO P-CCNC: 215 U/L — SIGNIFICANT CHANGE UP (ref 135–225)
LYMPHOCYTES # BLD AUTO: 1.94 K/UL — SIGNIFICANT CHANGE UP (ref 1–3.3)
LYMPHOCYTES # BLD AUTO: 21.2 % — SIGNIFICANT CHANGE UP (ref 13–44)
MAGNESIUM SERPL-MCNC: 5.5 MG/DL — HIGH (ref 1.6–2.6)
MCHC RBC-ENTMCNC: 28.4 PG — SIGNIFICANT CHANGE UP (ref 27–34)
MCHC RBC-ENTMCNC: 33.5 % — SIGNIFICANT CHANGE UP (ref 32–36)
MCV RBC AUTO: 84.8 FL — SIGNIFICANT CHANGE UP (ref 80–100)
MONOCYTES # BLD AUTO: 1.14 K/UL — HIGH (ref 0–0.9)
MONOCYTES NFR BLD AUTO: 12.5 % — SIGNIFICANT CHANGE UP (ref 2–14)
NEUTROPHILS # BLD AUTO: 5.85 K/UL — SIGNIFICANT CHANGE UP (ref 1.8–7.4)
NEUTROPHILS NFR BLD AUTO: 64 % — SIGNIFICANT CHANGE UP (ref 43–77)
NRBC # FLD: 0 K/UL — SIGNIFICANT CHANGE UP (ref 0–0)
PLATELET # BLD AUTO: 207 K/UL — SIGNIFICANT CHANGE UP (ref 150–400)
PMV BLD: 11.7 FL — SIGNIFICANT CHANGE UP (ref 7–13)
POTASSIUM SERPL-MCNC: 4.1 MMOL/L — SIGNIFICANT CHANGE UP (ref 3.5–5.3)
POTASSIUM SERPL-SCNC: 4.1 MMOL/L — SIGNIFICANT CHANGE UP (ref 3.5–5.3)
PROT SERPL-MCNC: 6.1 G/DL — SIGNIFICANT CHANGE UP (ref 6–8.3)
PROTHROM AB SERPL-ACNC: 10.5 SEC — SIGNIFICANT CHANGE UP (ref 9.8–13.1)
RBC # BLD: 2.89 M/UL — LOW (ref 3.8–5.2)
RBC # FLD: 14.9 % — HIGH (ref 10.3–14.5)
SODIUM SERPL-SCNC: 134 MMOL/L — LOW (ref 135–145)
URATE SERPL-MCNC: 10.6 MG/DL — HIGH (ref 2.5–7)
WBC # BLD: 9.15 K/UL — SIGNIFICANT CHANGE UP (ref 3.8–10.5)
WBC # FLD AUTO: 9.15 K/UL — SIGNIFICANT CHANGE UP (ref 3.8–10.5)

## 2020-04-30 RX ADMIN — Medication 975 MILLIGRAM(S): at 06:33

## 2020-04-30 RX ADMIN — Medication 975 MILLIGRAM(S): at 00:07

## 2020-04-30 RX ADMIN — Medication 30 MILLIGRAM(S): at 00:08

## 2020-04-30 RX ADMIN — Medication 500 MILLIGRAM(S): at 17:07

## 2020-04-30 RX ADMIN — Medication 975 MILLIGRAM(S): at 17:06

## 2020-04-30 RX ADMIN — Medication 975 MILLIGRAM(S): at 23:11

## 2020-04-30 RX ADMIN — Medication 30 MILLIGRAM(S): at 21:22

## 2020-04-30 RX ADMIN — Medication 100 MILLIGRAM(S): at 06:33

## 2020-04-30 RX ADMIN — METFORMIN HYDROCHLORIDE 500 MILLIGRAM(S): 850 TABLET ORAL at 17:07

## 2020-04-30 RX ADMIN — HEPARIN SODIUM 10000 UNIT(S): 5000 INJECTION INTRAVENOUS; SUBCUTANEOUS at 17:05

## 2020-04-30 RX ADMIN — HEPARIN SODIUM 10000 UNIT(S): 5000 INJECTION INTRAVENOUS; SUBCUTANEOUS at 06:33

## 2020-04-30 RX ADMIN — OXYCODONE HYDROCHLORIDE 5 MILLIGRAM(S): 5 TABLET ORAL at 21:22

## 2020-04-30 RX ADMIN — SIMETHICONE 80 MILLIGRAM(S): 80 TABLET, CHEWABLE ORAL at 00:08

## 2020-04-30 RX ADMIN — METFORMIN HYDROCHLORIDE 500 MILLIGRAM(S): 850 TABLET ORAL at 06:33

## 2020-04-30 RX ADMIN — Medication 30 MILLIGRAM(S): at 06:33

## 2020-04-30 RX ADMIN — Medication 100 MILLIGRAM(S): at 21:22

## 2020-04-30 RX ADMIN — Medication 500 MILLIGRAM(S): at 06:33

## 2020-04-30 RX ADMIN — Medication 100 MILLIGRAM(S): at 14:44

## 2020-04-30 RX ADMIN — Medication 975 MILLIGRAM(S): at 11:38

## 2020-04-30 NOTE — PROGRESS NOTE ADULT - SUBJECTIVE AND OBJECTIVE BOX
Postop Day  __1_ s/p   C- Section    THERAPY:  [ x ] Spinal morphine   [  ] Epidural morphine   [  ] IV PCA Hydromorphone 1 mg/ml      Sedation Score:	  [  x] Alert	    [  ] Drowsy        [  ] Arousable	[  ] Asleep	[  ] Unresponsive    Side Effects:	  [ x ] None	     [  ] Nausea        [  ] Pruritus        [  ] Weakness   [  ] Numbness        ASSESSMENT/ PLAN   [ x  ] Discontinue         [  ] Continue  [ x ]Documentation and Verification of current medications     Comments: no HA, sitting upright

## 2020-04-30 NOTE — PROGRESS NOTE ADULT - ASSESSMENT
POD 1 s/p CS, covid pos, PEC with severe features s/p magnesium sulfate on labetalol, DM    c/w current management  routine postpartum/post op care   ambulation  will continue to monitor

## 2020-04-30 NOTE — PROGRESS NOTE ADULT - SUBJECTIVE AND OBJECTIVE BOX
INTERVAL HPI/OVERNIGHT EVENTS: Pt seen and examined at bedside.  Adequate PO pain control. +flatus. +voiding without difficulty, +ambulation, javier reg diet.    MEDICATIONS  (STANDING):  acetaminophen   Tablet .. 975 milliGRAM(s) Oral <User Schedule>  calcium carbonate    500 mG (Tums) Chewable 1 Tablet(s) Chew three times a day  ceFAZolin   IVPB 2000 milliGRAM(s) IV Intermittent every 8 hours  dextrose 5%. 1000 milliLiter(s) (50 mL/Hr) IV Continuous <Continuous>  dextrose 50% Injectable 12.5 Gram(s) IV Push once  dextrose 50% Injectable 25 Gram(s) IV Push once  dextrose 50% Injectable 25 Gram(s) IV Push once  diphtheria/tetanus/pertussis (acellular) Vaccine (ADAcel) 0.5 milliLiter(s) IntraMuscular once  famotidine    Tablet 20 milliGRAM(s) Oral two times a day  heparin   Injectable 08788 Unit(s) SubCutaneous every 12 hours  ibuprofen  Tablet. 600 milliGRAM(s) Oral every 6 hours  labetalol 500 milliGRAM(s) Oral every 12 hours  metFORMIN 500 milliGRAM(s) Oral every 12 hours  NIFEdipine XL 30 milliGRAM(s) Oral at bedtime  oxytocin Infusion 20.833 milliUNIT(s)/Min (62.5 mL/Hr) IV Continuous <Continuous>    MEDICATIONS  (PRN):  ALBUTerol    90 MICROgram(s) HFA Inhaler 2 Puff(s) Inhalation every 6 hours PRN Shortness of Breath and/or Wheezing  dextrose 40% Gel 15 Gram(s) Oral once PRN Blood Glucose LESS THAN 70 milliGRAM(s)/deciliter  diphenhydrAMINE 25 milliGRAM(s) Oral every 6 hours PRN Itching  glucagon  Injectable 1 milliGRAM(s) IntraMuscular once PRN Glucose LESS THAN 70 milligrams/deciliter  lanolin Ointment 1 Application(s) Topical every 6 hours PRN Sore Nipples  magnesium hydroxide Suspension 30 milliLiter(s) Oral two times a day PRN Constipation  oxyCODONE    IR 5 milliGRAM(s) Oral once PRN Moderate to Severe Pain (4-10)  oxyCODONE    IR 5 milliGRAM(s) Oral every 3 hours PRN Moderate to Severe Pain (4-10)  simethicone 80 milliGRAM(s) Chew every 4 hours PRN Gas      Vital Signs Last 24 Hrs  T(C): 37 (30 Apr 2020 10:52), Max: 37.9 (29 Apr 2020 14:49)  T(F): 98.6 (30 Apr 2020 10:52), Max: 100.2 (29 Apr 2020 14:49)  HR: 97 (30 Apr 2020 10:52) (79 - 97)  BP: 117/61 (30 Apr 2020 10:52) (117/61 - 154/70)  BP(mean): --  RR: 17 (30 Apr 2020 10:52) (17 - 18)  SpO2: 99% (30 Apr 2020 10:52) (97% - 100%)    PHYSICAL EXAM:    GA: NAD, A+0 x 3  Abd: ( + ) BS, soft, nontender, nondistended, no rebound or guarding,   Uterus: Fundus midline; firm  Incision: C/D/I    LABS:                        8.2    9.15  )-----------( 207      ( 30 Apr 2020 06:50 )             24.5     04-30    134<L>  |  102  |  12  ----------------------------<  92  4.1   |  21<L>  |  0.86    Ca    8.6      30 Apr 2020 06:50  Mg     5.5     04-30    TPro  6.1  /  Alb  2.8<L>  /  TBili  < 0.2<L>  /  DBili  x   /  AST  31  /  ALT  14  /  AlkPhos  131<H>  04-30    PT/INR - ( 30 Apr 2020 06:50 )   PT: 10.5 SEC;   INR: 0.92          PTT - ( 30 Apr 2020 06:50 )  PTT:24.1 SEC

## 2020-05-01 VITALS
TEMPERATURE: 99 F | SYSTOLIC BLOOD PRESSURE: 143 MMHG | OXYGEN SATURATION: 100 % | DIASTOLIC BLOOD PRESSURE: 81 MMHG | HEART RATE: 89 BPM | RESPIRATION RATE: 18 BRPM

## 2020-05-01 DIAGNOSIS — I10 ESSENTIAL (PRIMARY) HYPERTENSION: ICD-10-CM

## 2020-05-01 DIAGNOSIS — E11.9 TYPE 2 DIABETES MELLITUS WITHOUT COMPLICATIONS: ICD-10-CM

## 2020-05-01 DIAGNOSIS — E78.5 HYPERLIPIDEMIA, UNSPECIFIED: ICD-10-CM

## 2020-05-01 LAB
BASOPHILS # BLD AUTO: 0.02 K/UL — SIGNIFICANT CHANGE UP (ref 0–0.2)
BASOPHILS NFR BLD AUTO: 0.2 % — SIGNIFICANT CHANGE UP (ref 0–2)
EOSINOPHIL # BLD AUTO: 0.24 K/UL — SIGNIFICANT CHANGE UP (ref 0–0.5)
EOSINOPHIL NFR BLD AUTO: 2.7 % — SIGNIFICANT CHANGE UP (ref 0–6)
GLUCOSE BLDC GLUCOMTR-MCNC: 119 MG/DL — HIGH (ref 70–99)
GLUCOSE BLDC GLUCOMTR-MCNC: 90 MG/DL — SIGNIFICANT CHANGE UP (ref 70–99)
GLUCOSE BLDC GLUCOMTR-MCNC: 96 MG/DL — SIGNIFICANT CHANGE UP (ref 70–99)
HCT VFR BLD CALC: 24.4 % — LOW (ref 34.5–45)
HGB BLD-MCNC: 8.1 G/DL — LOW (ref 11.5–15.5)
IMM GRANULOCYTES NFR BLD AUTO: 0.7 % — SIGNIFICANT CHANGE UP (ref 0–1.5)
LYMPHOCYTES # BLD AUTO: 2.17 K/UL — SIGNIFICANT CHANGE UP (ref 1–3.3)
LYMPHOCYTES # BLD AUTO: 24.5 % — SIGNIFICANT CHANGE UP (ref 13–44)
MCHC RBC-ENTMCNC: 28.3 PG — SIGNIFICANT CHANGE UP (ref 27–34)
MCHC RBC-ENTMCNC: 33.2 % — SIGNIFICANT CHANGE UP (ref 32–36)
MCV RBC AUTO: 85.3 FL — SIGNIFICANT CHANGE UP (ref 80–100)
MONOCYTES # BLD AUTO: 1.17 K/UL — HIGH (ref 0–0.9)
MONOCYTES NFR BLD AUTO: 13.2 % — SIGNIFICANT CHANGE UP (ref 2–14)
NEUTROPHILS # BLD AUTO: 5.2 K/UL — SIGNIFICANT CHANGE UP (ref 1.8–7.4)
NEUTROPHILS NFR BLD AUTO: 58.7 % — SIGNIFICANT CHANGE UP (ref 43–77)
NRBC # FLD: 0 K/UL — SIGNIFICANT CHANGE UP (ref 0–0)
PLATELET # BLD AUTO: 212 K/UL — SIGNIFICANT CHANGE UP (ref 150–400)
PMV BLD: 10.9 FL — SIGNIFICANT CHANGE UP (ref 7–13)
RBC # BLD: 2.86 M/UL — LOW (ref 3.8–5.2)
RBC # FLD: 14.8 % — HIGH (ref 10.3–14.5)
WBC # BLD: 8.86 K/UL — SIGNIFICANT CHANGE UP (ref 3.8–10.5)
WBC # FLD AUTO: 8.86 K/UL — SIGNIFICANT CHANGE UP (ref 3.8–10.5)

## 2020-05-01 PROCEDURE — 99254 IP/OBS CNSLTJ NEW/EST MOD 60: CPT | Mod: GC

## 2020-05-01 RX ORDER — NIFEDIPINE 30 MG
1 TABLET, EXTENDED RELEASE 24 HR ORAL
Qty: 0 | Refills: 0 | DISCHARGE

## 2020-05-01 RX ORDER — LABETALOL HCL 100 MG
5 TABLET ORAL
Qty: 280 | Refills: 3
Start: 2020-05-01 | End: 2020-08-20

## 2020-05-01 RX ORDER — METFORMIN HYDROCHLORIDE 850 MG/1
1 TABLET ORAL
Qty: 0 | Refills: 0 | DISCHARGE

## 2020-05-01 RX ORDER — NIFEDIPINE 30 MG
1 TABLET, EXTENDED RELEASE 24 HR ORAL
Qty: 28 | Refills: 2
Start: 2020-05-01 | End: 2020-07-23

## 2020-05-01 RX ORDER — LABETALOL HCL 100 MG
400 TABLET ORAL
Qty: 0 | Refills: 0 | DISCHARGE

## 2020-05-01 RX ORDER — NIFEDIPINE 30 MG
1 TABLET, EXTENDED RELEASE 24 HR ORAL
Qty: 28 | Refills: 3
Start: 2020-05-01 | End: 2020-08-20

## 2020-05-01 RX ADMIN — SIMETHICONE 80 MILLIGRAM(S): 80 TABLET, CHEWABLE ORAL at 10:17

## 2020-05-01 RX ADMIN — Medication 100 MILLIGRAM(S): at 06:44

## 2020-05-01 RX ADMIN — OXYCODONE HYDROCHLORIDE 5 MILLIGRAM(S): 5 TABLET ORAL at 02:02

## 2020-05-01 RX ADMIN — SIMETHICONE 80 MILLIGRAM(S): 80 TABLET, CHEWABLE ORAL at 14:57

## 2020-05-01 RX ADMIN — HEPARIN SODIUM 10000 UNIT(S): 5000 INJECTION INTRAVENOUS; SUBCUTANEOUS at 17:03

## 2020-05-01 RX ADMIN — Medication 500 MILLIGRAM(S): at 17:02

## 2020-05-01 RX ADMIN — Medication 975 MILLIGRAM(S): at 12:20

## 2020-05-01 RX ADMIN — SIMETHICONE 80 MILLIGRAM(S): 80 TABLET, CHEWABLE ORAL at 02:02

## 2020-05-01 RX ADMIN — Medication 975 MILLIGRAM(S): at 06:43

## 2020-05-01 RX ADMIN — Medication 500 MILLIGRAM(S): at 06:43

## 2020-05-01 RX ADMIN — METFORMIN HYDROCHLORIDE 500 MILLIGRAM(S): 850 TABLET ORAL at 17:02

## 2020-05-01 RX ADMIN — OXYCODONE HYDROCHLORIDE 5 MILLIGRAM(S): 5 TABLET ORAL at 05:32

## 2020-05-01 RX ADMIN — HEPARIN SODIUM 10000 UNIT(S): 5000 INJECTION INTRAVENOUS; SUBCUTANEOUS at 06:43

## 2020-05-01 RX ADMIN — METFORMIN HYDROCHLORIDE 500 MILLIGRAM(S): 850 TABLET ORAL at 06:43

## 2020-05-01 NOTE — CONSULT NOTE ADULT - ASSESSMENT
37yo G1 at 37w1d with h/o asthma, cHTN, T2DM and COVID infection presenting for scheduled induction. Endocrine consulted for Type 2 DM management post-partum.

## 2020-05-01 NOTE — CONSULT NOTE ADULT - PROBLEM SELECTOR RECOMMENDATION 2
-On labetolol and nifedipine. BP borderline elevated. Goal <130/80. Monitor BP and management as per ob team

## 2020-05-01 NOTE — CONSULT NOTE ADULT - ATTENDING COMMENTS
36F DM2 (although she states was resolved prior to pregnancy so maybe GDM) now postpartum.  Glucose stable on metformin 500mg BID, can dc on same.  Outpatient follow up with pcp for management.

## 2020-05-01 NOTE — CONSULT NOTE ADULT - SUBJECTIVE AND OBJECTIVE BOX
HPI:  Patient is a 37yo G1 at 37w1d with h/o asthma, cHTN, T2DM and COVID infection presenting for scheduled induction due to super-imposed PEC with polyhydramnios and unstable fetal lie. Patient states on Thursday she had an ultrasound in which fetus was vertex and EMA was 26. Patient has cHTN for which she has been taking labetalol 500 BID (10AM/PM), labetalol 400 (4PM), procardia XL 30mg (10PM), however she was diagnosed with super-imposed PEC during pregnancy due to an elevated 24hr urine protein collection (322mg, 350mg on two occasions). Patient was diagnosed with T2DM 2 years ago and glucose levels were well-controlled with lifestyle changes. However, she has been taking metformin 500mg BID for PCOS. Patient had symptomatic COVID infection on 3/26 but has been asymptomatic for over 2 weeks. Patient reports good FM, denies VB, LOF or CTX.    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), chlamydia infection s/p treatment with test of cure 2019, denies h/o fibroids  PMH: T2DM, cHTN, asthma  PSH: gastric sleeve 4/2018, rotator cuff repair 6/2019  Meds: labetalol 500mg BID (10AM/PM) and 400mg once a day (4PM), procardia XL 30 (10PM), metformin 500mg BID, famotidine, zyrtec  All: NKDA  Social: denies alcohol, drug, tobacco use in pregnancy    Vitals: /100, HR 84, O2 99% RA  SSE: no herpes lesions, physiologic discharge  SVE: 0/0/-3  EFM: 140bpm/mod simone/-decels  TOCO: no ctx  Sono: vertex (26 Apr 2020 16:48)      PAST MEDICAL & SURGICAL HISTORY:  COVID-19 virus infection  Diabetes mellitus, type 2  PCOS (polycystic ovarian syndrome)  Asthma: only related to URI  Hypertension: controlled by medication  History of rotator cuff surgery: right shoulder 2019  H/O bariatric surgery: 2018 gastric sleeve 2018      FAMILY HISTORY:      Social History:    Outpatient Medications:    MEDICATIONS  (STANDING):  acetaminophen   Tablet .. 975 milliGRAM(s) Oral <User Schedule>  calcium carbonate    500 mG (Tums) Chewable 1 Tablet(s) Chew three times a day  dextrose 5%. 1000 milliLiter(s) (50 mL/Hr) IV Continuous <Continuous>  dextrose 50% Injectable 12.5 Gram(s) IV Push once  dextrose 50% Injectable 25 Gram(s) IV Push once  dextrose 50% Injectable 25 Gram(s) IV Push once  diphtheria/tetanus/pertussis (acellular) Vaccine (ADAcel) 0.5 milliLiter(s) IntraMuscular once  famotidine    Tablet 20 milliGRAM(s) Oral two times a day  heparin   Injectable 69896 Unit(s) SubCutaneous every 12 hours  ibuprofen  Tablet. 600 milliGRAM(s) Oral every 6 hours  labetalol 500 milliGRAM(s) Oral every 12 hours  metFORMIN 500 milliGRAM(s) Oral every 12 hours  NIFEdipine XL 30 milliGRAM(s) Oral at bedtime  oxytocin Infusion 20.833 milliUNIT(s)/Min (62.5 mL/Hr) IV Continuous <Continuous>    MEDICATIONS  (PRN):  ALBUTerol    90 MICROgram(s) HFA Inhaler 2 Puff(s) Inhalation every 6 hours PRN Shortness of Breath and/or Wheezing  dextrose 40% Gel 15 Gram(s) Oral once PRN Blood Glucose LESS THAN 70 milliGRAM(s)/deciliter  diphenhydrAMINE 25 milliGRAM(s) Oral every 6 hours PRN Itching  glucagon  Injectable 1 milliGRAM(s) IntraMuscular once PRN Glucose LESS THAN 70 milligrams/deciliter  lanolin Ointment 1 Application(s) Topical every 6 hours PRN Sore Nipples  magnesium hydroxide Suspension 30 milliLiter(s) Oral two times a day PRN Constipation  oxyCODONE    IR 5 milliGRAM(s) Oral once PRN Moderate to Severe Pain (4-10)  oxyCODONE    IR 5 milliGRAM(s) Oral every 3 hours PRN Moderate to Severe Pain (4-10)  simethicone 80 milliGRAM(s) Chew every 4 hours PRN Gas      Allergies    No Known Allergies    Intolerances      Review of Systems:  Constitutional: No fever  Eyes: No blurry vision  Neuro: No tremors  HEENT: No pain  Cardiovascular: No chest pain, palpitations  Respiratory: No SOB, no cough  GI: No nausea, vomiting, abdominal pain  : No dysuria  Skin: no rash  Psych: no depression  Endocrine: no polyuria, polydipsia  Hem/lymph: no swelling  Osteoporosis: no fractures    ALL OTHER SYSTEMS REVIEWED AND NEGATIVE    UNABLE TO OBTAIN    PHYSICAL EXAM:  VITALS: T(C): 37.1 (05-01-20 @ 14:05)  T(F): 98.7 (05-01-20 @ 14:05), Max: 100.2 (04-30-20 @ 17:11)  HR: 89 (05-01-20 @ 14:05) (78 - 100)  BP: 143/81 (05-01-20 @ 14:05) (124/65 - 152/80)  RR:  (17 - 18)  SpO2:  (96% - 100%)  Wt(kg): --  GENERAL: NAD, well-groomed, well-developed  EYES: No proptosis, no lid lag, anicteric  HEENT:  Atraumatic, Normocephalic, moist mucous membranes  THYROID: Normal size, no palpable nodules  RESPIRATORY: Clear to auscultation bilaterally; No rales, rhonchi, wheezing  CARDIOVASCULAR: Regular rate and rhythm; No murmurs; no peripheral edema  GI: Soft, nontender, non distended, normal bowel sounds  SKIN: Dry, intact, No rashes or lesions  MUSCULOSKELETAL: Full range of motion, normal strength  NEURO: sensation intact, extraocular movements intact, no tremor  PSYCH: Alert and oriented x 3, normal affect, normal mood  CUSHING'S SIGNS: no striae    POCT Blood Glucose.: 96 mg/dL (05-01-20 @ 11:36)  POCT Blood Glucose.: 90 mg/dL (05-01-20 @ 07:46)  POCT Blood Glucose.: 92 mg/dL (04-30-20 @ 22:25)  POCT Blood Glucose.: 94 mg/dL (04-30-20 @ 17:06)  POCT Blood Glucose.: 103 mg/dL (04-30-20 @ 11:40)  POCT Blood Glucose.: 97 mg/dL (04-30-20 @ 07:55)  POCT Blood Glucose.: 122 mg/dL (04-29-20 @ 22:22)  POCT Blood Glucose.: 105 mg/dL (04-29-20 @ 17:36)  POCT Blood Glucose.: 91 mg/dL (04-29-20 @ 06:02)  POCT Blood Glucose.: 107 mg/dL (04-29-20 @ 02:09)  POCT Blood Glucose.: 96 mg/dL (04-28-20 @ 21:54)  POCT Blood Glucose.: 103 mg/dL (04-28-20 @ 17:36)                            8.1    8.86  )-----------( 212      ( 01 May 2020 06:17 )             24.4       04-30    134<L>  |  102  |  12  ----------------------------<  92  4.1   |  21<L>  |  0.86    EGFR if : 101  EGFR if non : 88    Ca    8.6      04-30  Mg     5.5     04-30    TPro  6.1  /  Alb  2.8<L>  /  TBili  < 0.2<L>  /  DBili  x   /  AST  31  /  ALT  14  /  AlkPhos  131<H>  04-30      Thyroid Function Tests:                Radiology: **Due to [facility] policy during the evolving novel coronavirus outbreak, efforts are being made to limit unnecessary patient contacts to limit the spread of disease. Accordingly, patients without clear indication for physical exam or face to face interview from the Endocrine Team will be adjusted in conjunction with conversations with primary provider team. This is being done for the safety of all patients we care for.      HPI:  Patient is a 37yo G1 at 37w1d with h/o asthma, cHTN, T2DM and COVID infection presenting for scheduled induction due to super-imposed PEC with polyhydramnios and unstable fetal lie. Patient states on Thursday she had an ultrasound in which fetus was vertex and EMA was 26. Patient has cHTN for which she has been taking labetalol 500 BID (10AM/PM), labetalol 400 (4PM), procardia XL 30mg (10PM), however she was diagnosed with super-imposed PEC during pregnancy due to an elevated 24hr urine protein collection (322mg, 350mg on two occasions). Patient was diagnosed with T2DM 2 years ago and glucose levels were well-controlled with lifestyle changes. However, she has been taking metformin 500mg BID for PCOS. Patient had symptomatic COVID infection on 3/26 but has been asymptomatic for over 2 weeks. Patient reports good FM, denies VB, LOF or CTX.    Endocrine History:  Hx as per phone conversation with patient.    Patient reports was first diagnosed with Type 2 DM 2 years ago. Was started on metformin 500mg BID. Reports diabetes resolved and was just continued on the metformin for her PCOS. Has never been on insulin. This is pt's first pregnancy. Was continued on the metformin 500mg BID during the pregnancy. Reports was checking her BG 4X/day. In AM Bg 70s-90s, and 1 hr post-prandials <120. A1c 5.3 currently. Reports was following a low carb diet. Denies any hx of retinopathy or neuropathy.    PAST MEDICAL & SURGICAL HISTORY:  COVID-19 virus infection  Diabetes mellitus, type 2  PCOS (polycystic ovarian syndrome)  Asthma: only related to URI  Hypertension: controlled by medication  History of rotator cuff surgery: right shoulder 2019  H/O bariatric surgery: 2018 gastric sleeve 2018      FAMILY HISTORY:  Aunt with a hx of Type 2 DM    Social History: denies smoking, alcohol use    Outpatient Medications:  labetalol 500mg BID (10AM/PM) and 400mg once a day (4PM), procardia XL 30 (10PM), metformin 500mg BID, famotidine, zyrtec      MEDICATIONS  (STANDING):  acetaminophen   Tablet .. 975 milliGRAM(s) Oral <User Schedule>  calcium carbonate    500 mG (Tums) Chewable 1 Tablet(s) Chew three times a day  dextrose 5%. 1000 milliLiter(s) (50 mL/Hr) IV Continuous <Continuous>  dextrose 50% Injectable 12.5 Gram(s) IV Push once  dextrose 50% Injectable 25 Gram(s) IV Push once  dextrose 50% Injectable 25 Gram(s) IV Push once  diphtheria/tetanus/pertussis (acellular) Vaccine (ADAcel) 0.5 milliLiter(s) IntraMuscular once  famotidine    Tablet 20 milliGRAM(s) Oral two times a day  heparin   Injectable 45628 Unit(s) SubCutaneous every 12 hours  ibuprofen  Tablet. 600 milliGRAM(s) Oral every 6 hours  labetalol 500 milliGRAM(s) Oral every 12 hours  metFORMIN 500 milliGRAM(s) Oral every 12 hours  NIFEdipine XL 30 milliGRAM(s) Oral at bedtime  oxytocin Infusion 20.833 milliUNIT(s)/Min (62.5 mL/Hr) IV Continuous <Continuous>    MEDICATIONS  (PRN):  ALBUTerol    90 MICROgram(s) HFA Inhaler 2 Puff(s) Inhalation every 6 hours PRN Shortness of Breath and/or Wheezing  dextrose 40% Gel 15 Gram(s) Oral once PRN Blood Glucose LESS THAN 70 milliGRAM(s)/deciliter  diphenhydrAMINE 25 milliGRAM(s) Oral every 6 hours PRN Itching  glucagon  Injectable 1 milliGRAM(s) IntraMuscular once PRN Glucose LESS THAN 70 milligrams/deciliter  lanolin Ointment 1 Application(s) Topical every 6 hours PRN Sore Nipples  magnesium hydroxide Suspension 30 milliLiter(s) Oral two times a day PRN Constipation  oxyCODONE    IR 5 milliGRAM(s) Oral once PRN Moderate to Severe Pain (4-10)  oxyCODONE    IR 5 milliGRAM(s) Oral every 3 hours PRN Moderate to Severe Pain (4-10)  simethicone 80 milliGRAM(s) Chew every 4 hours PRN Gas      Allergies    No Known Allergies    Intolerances      Review of Systems:  Constitutional: No fever  Eyes: No blurry vision  Neuro: No tremors  HEENT: No pain  Cardiovascular: No chest pain, palpitations  Respiratory: No SOB, no cough  GI: No nausea, vomiting, abdominal pain  : No dysuria  Skin: no rash  Endocrine: no polyuria, polydipsia  Hem/lymph: no swelling  Osteoporosis: no fractures    ALL OTHER SYSTEMS REVIEWED AND NEGATIVE      PHYSICAL EXAM:  VITALS: T(C): 37.1 (05-01-20 @ 14:05)  T(F): 98.7 (05-01-20 @ 14:05), Max: 100.2 (04-30-20 @ 17:11)  HR: 89 (05-01-20 @ 14:05) (78 - 100)  BP: 143/81 (05-01-20 @ 14:05) (124/65 - 152/80)  RR:  (17 - 18)  SpO2:  (96% - 100%)  **Due to [facility] policy during the evolving novel coronavirus outbreak, efforts are being made to limit unnecessary patient contacts to limit the spread of disease. Accordingly, patients without clear indication for physical exam or face to face interview from the Endocrine Team will be adjusted in conjunction with conversations with primary provider team. This is being done for the safety of all patients we care for.      PSYCH: Alert and oriented x 3, normal affect, normal mood    Rest of physical exam as per OB note on 4/30            POCT Blood Glucose.: 96 mg/dL (05-01-20 @ 11:36)  POCT Blood Glucose.: 90 mg/dL (05-01-20 @ 07:46)  POCT Blood Glucose.: 92 mg/dL (04-30-20 @ 22:25)  POCT Blood Glucose.: 94 mg/dL (04-30-20 @ 17:06)  POCT Blood Glucose.: 103 mg/dL (04-30-20 @ 11:40)  POCT Blood Glucose.: 97 mg/dL (04-30-20 @ 07:55)  POCT Blood Glucose.: 122 mg/dL (04-29-20 @ 22:22)  POCT Blood Glucose.: 105 mg/dL (04-29-20 @ 17:36)  POCT Blood Glucose.: 91 mg/dL (04-29-20 @ 06:02)  POCT Blood Glucose.: 107 mg/dL (04-29-20 @ 02:09)  POCT Blood Glucose.: 96 mg/dL (04-28-20 @ 21:54)  POCT Blood Glucose.: 103 mg/dL (04-28-20 @ 17:36)                            8.1    8.86  )-----------( 212      ( 01 May 2020 06:17 )             24.4       04-30    134<L>  |  102  |  12  ----------------------------<  92  4.1   |  21<L>  |  0.86    EGFR if : 101  EGFR if non : 88    Ca    8.6      04-30  Mg     5.5     04-30    TPro  6.1  /  Alb  2.8<L>  /  TBili  < 0.2<L>  /  DBili  x   /  AST  31  /  ALT  14  /  AlkPhos  131<H>  04-30                Radiology: **Due to [facility] policy during the evolving novel coronavirus outbreak, efforts are being made to limit unnecessary patient contacts to limit the spread of disease. Accordingly, patients without clear indication for physical exam or face to face interview from the Endocrine Team will be adjusted in conjunction with conversations with primary provider team. This is being done for the safety of all patients we care for.      HPI:  Patient is a 37yo G1 at 37w1d with h/o asthma, cHTN, T2DM and COVID infection presenting for scheduled induction due to super-imposed PEC with polyhydramnios and unstable fetal lie. Patient states on Thursday she had an ultrasound in which fetus was vertex and EMA was 26. Patient has cHTN for which she has been taking labetalol 500 BID (10AM/PM), labetalol 400 (4PM), procardia XL 30mg (10PM), however she was diagnosed with super-imposed PEC during pregnancy due to an elevated 24hr urine protein collection (322mg, 350mg on two occasions). Patient was diagnosed with T2DM 2 years ago and glucose levels were well-controlled with lifestyle changes. However, she has been taking metformin 500mg BID for PCOS. Patient had symptomatic COVID infection on 3/26 but has been asymptomatic for over 2 weeks. Patient reports good FM, denies VB, LOF or CTX.    Endocrine History:  Hx as per phone conversation with patient.    Patient reports was first diagnosed with Type 2 DM 2 years ago. Was started on metformin 500mg BID. Reports diabetes resolved and was just continued on the metformin for her PCOS. Has never been on insulin. This is pt's first pregnancy. Was continued on the metformin 500mg BID during the pregnancy. Reports was checking her BG 4X/day. In AM Bg 70s-90s, and 1 hr post-prandials <120. A1c 5.3 currently. Reports was following a low carb diet. Denies any hx of retinopathy or neuropathy.    PAST MEDICAL & SURGICAL HISTORY:  COVID-19 virus infection  Diabetes mellitus, type 2  PCOS (polycystic ovarian syndrome)  Asthma: only related to URI  Hypertension: controlled by medication  History of rotator cuff surgery: right shoulder 2019  H/O bariatric surgery: 2018 gastric sleeve 2018      FAMILY HISTORY:  Aunt with a hx of Type 2 DM    Social History: denies smoking, alcohol use    Outpatient Medications:  labetalol 500mg BID (10AM/PM) and 400mg once a day (4PM), procardia XL 30 (10PM), metformin 500mg BID, famotidine, zyrtec      MEDICATIONS  (STANDING):  acetaminophen   Tablet .. 975 milliGRAM(s) Oral <User Schedule>  calcium carbonate    500 mG (Tums) Chewable 1 Tablet(s) Chew three times a day  dextrose 5%. 1000 milliLiter(s) (50 mL/Hr) IV Continuous <Continuous>  dextrose 50% Injectable 12.5 Gram(s) IV Push once  dextrose 50% Injectable 25 Gram(s) IV Push once  dextrose 50% Injectable 25 Gram(s) IV Push once  diphtheria/tetanus/pertussis (acellular) Vaccine (ADAcel) 0.5 milliLiter(s) IntraMuscular once  famotidine    Tablet 20 milliGRAM(s) Oral two times a day  heparin   Injectable 59262 Unit(s) SubCutaneous every 12 hours  ibuprofen  Tablet. 600 milliGRAM(s) Oral every 6 hours  labetalol 500 milliGRAM(s) Oral every 12 hours  metFORMIN 500 milliGRAM(s) Oral every 12 hours  NIFEdipine XL 30 milliGRAM(s) Oral at bedtime  oxytocin Infusion 20.833 milliUNIT(s)/Min (62.5 mL/Hr) IV Continuous <Continuous>    MEDICATIONS  (PRN):  ALBUTerol    90 MICROgram(s) HFA Inhaler 2 Puff(s) Inhalation every 6 hours PRN Shortness of Breath and/or Wheezing  dextrose 40% Gel 15 Gram(s) Oral once PRN Blood Glucose LESS THAN 70 milliGRAM(s)/deciliter  diphenhydrAMINE 25 milliGRAM(s) Oral every 6 hours PRN Itching  glucagon  Injectable 1 milliGRAM(s) IntraMuscular once PRN Glucose LESS THAN 70 milligrams/deciliter  lanolin Ointment 1 Application(s) Topical every 6 hours PRN Sore Nipples  magnesium hydroxide Suspension 30 milliLiter(s) Oral two times a day PRN Constipation  oxyCODONE    IR 5 milliGRAM(s) Oral once PRN Moderate to Severe Pain (4-10)  oxyCODONE    IR 5 milliGRAM(s) Oral every 3 hours PRN Moderate to Severe Pain (4-10)  simethicone 80 milliGRAM(s) Chew every 4 hours PRN Gas      Allergies    No Known Allergies    Intolerances      Review of Systems:  Constitutional: No fever  Eyes: No blurry vision  Neuro: No tremors  HEENT: No pain  Cardiovascular: No chest pain, palpitations  Respiratory: No SOB, no cough  GI: No nausea, vomiting, abdominal pain  : No dysuria  Skin: no rash  Endocrine: no polyuria, polydipsia  Hem/lymph: no swelling  Osteoporosis: no fractures    ALL OTHER SYSTEMS REVIEWED AND NEGATIVE      PHYSICAL EXAM:  VITALS: T(C): 37.1 (05-01-20 @ 14:05)  T(F): 98.7 (05-01-20 @ 14:05), Max: 100.2 (04-30-20 @ 17:11)  HR: 89 (05-01-20 @ 14:05) (78 - 100)  BP: 143/81 (05-01-20 @ 14:05) (124/65 - 152/80)  RR:  (17 - 18)  SpO2:  (96% - 100%)  **Due to [facility] policy during the evolving novel coronavirus outbreak, efforts are being made to limit unnecessary patient contacts to limit the spread of disease. Accordingly, patients without clear indication for physical exam or face to face interview from the Endocrine Team will be adjusted in conjunction with conversations with primary provider team. This is being done for the safety of all patients we care for.      PSYCH: Alert and oriented x 3, normal affect, normal mood    Rest of physical exam as per OB note on 4/30  GA: NAD, A+0 x 3  Abd: ( + ) BS, soft, nontender, nondistended, no rebound or guarding,   Uterus: Fundus midline; firm  Incision: C/D/I          POCT Blood Glucose.: 96 mg/dL (05-01-20 @ 11:36)  POCT Blood Glucose.: 90 mg/dL (05-01-20 @ 07:46)  POCT Blood Glucose.: 92 mg/dL (04-30-20 @ 22:25)  POCT Blood Glucose.: 94 mg/dL (04-30-20 @ 17:06)  POCT Blood Glucose.: 103 mg/dL (04-30-20 @ 11:40)  POCT Blood Glucose.: 97 mg/dL (04-30-20 @ 07:55)  POCT Blood Glucose.: 122 mg/dL (04-29-20 @ 22:22)  POCT Blood Glucose.: 105 mg/dL (04-29-20 @ 17:36)  POCT Blood Glucose.: 91 mg/dL (04-29-20 @ 06:02)  POCT Blood Glucose.: 107 mg/dL (04-29-20 @ 02:09)  POCT Blood Glucose.: 96 mg/dL (04-28-20 @ 21:54)  POCT Blood Glucose.: 103 mg/dL (04-28-20 @ 17:36)                            8.1    8.86  )-----------( 212      ( 01 May 2020 06:17 )             24.4       04-30    134<L>  |  102  |  12  ----------------------------<  92  4.1   |  21<L>  |  0.86    EGFR if : 101  EGFR if non : 88    Ca    8.6      04-30  Mg     5.5     04-30    TPro  6.1  /  Alb  2.8<L>  /  TBili  < 0.2<L>  /  DBili  x   /  AST  31  /  ALT  14  /  AlkPhos  131<H>  04-30                Radiology:

## 2020-05-01 NOTE — CONSULT NOTE ADULT - PROBLEM SELECTOR RECOMMENDATION 9
-Patient with a hx of Type 2 DM, currently post-partum. Last a1C 5.3.  -Also with a hx of PCOS, for which pt states is also taking the metformin for. Can c/w current home dose of metformin 500mg BID for discharge.   -If remains inpatient, can add humalog low dose correctional with meals and bedtime.  -Patient should get a OGTT and A1C 6-8 weeks post partum.   -patient can f/u with PMD as outpatient.

## 2020-05-02 ENCOUNTER — OUTPATIENT (OUTPATIENT)
Dept: INPATIENT UNIT | Facility: HOSPITAL | Age: 35
LOS: 1 days | End: 2020-05-02

## 2020-05-02 ENCOUNTER — EMERGENCY (EMERGENCY)
Facility: HOSPITAL | Age: 35
LOS: 1 days | Discharge: STILL ADMITTED OR ADMINISTRATIVE | End: 2020-05-02
Admitting: EMERGENCY MEDICINE

## 2020-05-02 VITALS
RESPIRATION RATE: 20 BRPM | HEART RATE: 112 BPM | DIASTOLIC BLOOD PRESSURE: 97 MMHG | SYSTOLIC BLOOD PRESSURE: 144 MMHG | TEMPERATURE: 99 F

## 2020-05-02 VITALS
DIASTOLIC BLOOD PRESSURE: 75 MMHG | RESPIRATION RATE: 18 BRPM | SYSTOLIC BLOOD PRESSURE: 157 MMHG | HEIGHT: 65 IN | TEMPERATURE: 98 F | HEART RATE: 118 BPM | OXYGEN SATURATION: 100 %

## 2020-05-02 VITALS
DIASTOLIC BLOOD PRESSURE: 88 MMHG | SYSTOLIC BLOOD PRESSURE: 141 MMHG | OXYGEN SATURATION: 100 % | HEART RATE: 72 BPM | RESPIRATION RATE: 16 BRPM

## 2020-05-02 DIAGNOSIS — I10 ESSENTIAL (PRIMARY) HYPERTENSION: ICD-10-CM

## 2020-05-02 DIAGNOSIS — Z98.890 OTHER SPECIFIED POSTPROCEDURAL STATES: Chronic | ICD-10-CM

## 2020-05-02 DIAGNOSIS — Z98.84 BARIATRIC SURGERY STATUS: Chronic | ICD-10-CM

## 2020-05-02 LAB
ALBUMIN SERPL ELPH-MCNC: 3.4 G/DL — SIGNIFICANT CHANGE UP (ref 3.3–5)
ALP SERPL-CCNC: 109 U/L — SIGNIFICANT CHANGE UP (ref 40–120)
ALT FLD-CCNC: 12 U/L — SIGNIFICANT CHANGE UP (ref 4–33)
ANION GAP SERPL CALC-SCNC: 13 MMO/L — SIGNIFICANT CHANGE UP (ref 7–14)
APPEARANCE UR: CLEAR — SIGNIFICANT CHANGE UP
APTT BLD: 28.7 SEC — SIGNIFICANT CHANGE UP (ref 27.5–36.3)
AST SERPL-CCNC: 26 U/L — SIGNIFICANT CHANGE UP (ref 4–32)
BASOPHILS # BLD AUTO: 0.03 K/UL — SIGNIFICANT CHANGE UP (ref 0–0.2)
BASOPHILS NFR BLD AUTO: 0.4 % — SIGNIFICANT CHANGE UP (ref 0–2)
BILIRUB SERPL-MCNC: < 0.2 MG/DL — LOW (ref 0.2–1.2)
BILIRUB UR-MCNC: NEGATIVE — SIGNIFICANT CHANGE UP
BLOOD UR QL VISUAL: SIGNIFICANT CHANGE UP
BUN SERPL-MCNC: 10 MG/DL — SIGNIFICANT CHANGE UP (ref 7–23)
CALCIUM SERPL-MCNC: 9.7 MG/DL — SIGNIFICANT CHANGE UP (ref 8.4–10.5)
CHLORIDE SERPL-SCNC: 106 MMOL/L — SIGNIFICANT CHANGE UP (ref 98–107)
CO2 SERPL-SCNC: 22 MMOL/L — SIGNIFICANT CHANGE UP (ref 22–31)
COLOR SPEC: COLORLESS — SIGNIFICANT CHANGE UP
CREAT ?TM UR-MCNC: 11.5 MG/DL — SIGNIFICANT CHANGE UP
CREAT SERPL-MCNC: 0.72 MG/DL — SIGNIFICANT CHANGE UP (ref 0.5–1.3)
EOSINOPHIL # BLD AUTO: 0.46 K/UL — SIGNIFICANT CHANGE UP (ref 0–0.5)
EOSINOPHIL NFR BLD AUTO: 5.5 % — SIGNIFICANT CHANGE UP (ref 0–6)
FIBRINOGEN PPP-MCNC: 825 MG/DL — HIGH (ref 300–520)
GLUCOSE SERPL-MCNC: 89 MG/DL — SIGNIFICANT CHANGE UP (ref 70–99)
GLUCOSE UR-MCNC: NEGATIVE — SIGNIFICANT CHANGE UP
HCT VFR BLD CALC: 26 % — LOW (ref 34.5–45)
HGB BLD-MCNC: 8.8 G/DL — LOW (ref 11.5–15.5)
IMM GRANULOCYTES NFR BLD AUTO: 1 % — SIGNIFICANT CHANGE UP (ref 0–1.5)
INR BLD: 0.92 — SIGNIFICANT CHANGE UP (ref 0.88–1.17)
KETONES UR-MCNC: NEGATIVE — SIGNIFICANT CHANGE UP
LDH SERPL L TO P-CCNC: 231 U/L — HIGH (ref 135–225)
LEUKOCYTE ESTERASE UR-ACNC: NEGATIVE — SIGNIFICANT CHANGE UP
LYMPHOCYTES # BLD AUTO: 1.97 K/UL — SIGNIFICANT CHANGE UP (ref 1–3.3)
LYMPHOCYTES # BLD AUTO: 23.7 % — SIGNIFICANT CHANGE UP (ref 13–44)
MCHC RBC-ENTMCNC: 28.8 PG — SIGNIFICANT CHANGE UP (ref 27–34)
MCHC RBC-ENTMCNC: 33.8 % — SIGNIFICANT CHANGE UP (ref 32–36)
MCV RBC AUTO: 85 FL — SIGNIFICANT CHANGE UP (ref 80–100)
MONOCYTES # BLD AUTO: 0.78 K/UL — SIGNIFICANT CHANGE UP (ref 0–0.9)
MONOCYTES NFR BLD AUTO: 9.4 % — SIGNIFICANT CHANGE UP (ref 2–14)
NEUTROPHILS # BLD AUTO: 4.99 K/UL — SIGNIFICANT CHANGE UP (ref 1.8–7.4)
NEUTROPHILS NFR BLD AUTO: 60 % — SIGNIFICANT CHANGE UP (ref 43–77)
NITRITE UR-MCNC: NEGATIVE — SIGNIFICANT CHANGE UP
NRBC # FLD: 0 K/UL — SIGNIFICANT CHANGE UP (ref 0–0)
PH UR: 7.5 — SIGNIFICANT CHANGE UP (ref 5–8)
PLATELET # BLD AUTO: 306 K/UL — SIGNIFICANT CHANGE UP (ref 150–400)
PMV BLD: 11.6 FL — SIGNIFICANT CHANGE UP (ref 7–13)
POTASSIUM SERPL-MCNC: 4.4 MMOL/L — SIGNIFICANT CHANGE UP (ref 3.5–5.3)
POTASSIUM SERPL-SCNC: 4.4 MMOL/L — SIGNIFICANT CHANGE UP (ref 3.5–5.3)
PROT SERPL-MCNC: 7.2 G/DL — SIGNIFICANT CHANGE UP (ref 6–8.3)
PROT UR-MCNC: < 4 MG/DL — SIGNIFICANT CHANGE UP
PROT UR-MCNC: NEGATIVE — SIGNIFICANT CHANGE UP
PROTHROM AB SERPL-ACNC: 10.5 SEC — SIGNIFICANT CHANGE UP (ref 9.8–13.1)
RBC # BLD: 3.06 M/UL — LOW (ref 3.8–5.2)
RBC # FLD: 14.6 % — HIGH (ref 10.3–14.5)
RBC CASTS # UR COMP ASSIST: SIGNIFICANT CHANGE UP (ref 0–?)
SODIUM SERPL-SCNC: 141 MMOL/L — SIGNIFICANT CHANGE UP (ref 135–145)
SP GR SPEC: 1.01 — SIGNIFICANT CHANGE UP (ref 1–1.04)
SQUAMOUS # UR AUTO: SIGNIFICANT CHANGE UP
URATE SERPL-MCNC: 7.8 MG/DL — HIGH (ref 2.5–7)
UROBILINOGEN FLD QL: NORMAL — SIGNIFICANT CHANGE UP
WBC # BLD: 8.31 K/UL — SIGNIFICANT CHANGE UP (ref 3.8–10.5)
WBC # FLD AUTO: 8.31 K/UL — SIGNIFICANT CHANGE UP (ref 3.8–10.5)

## 2020-05-02 RX ORDER — ACETAMINOPHEN 500 MG
975 TABLET ORAL ONCE
Refills: 0 | Status: COMPLETED | OUTPATIENT
Start: 2020-05-02 | End: 2020-05-02

## 2020-05-02 RX ORDER — SODIUM CHLORIDE 9 MG/ML
500 INJECTION, SOLUTION INTRAVENOUS
Refills: 0 | Status: DISCONTINUED | OUTPATIENT
Start: 2020-05-02 | End: 2020-05-02

## 2020-05-02 RX ORDER — DIPHENHYDRAMINE HCL 50 MG
25 CAPSULE ORAL ONCE
Refills: 0 | Status: COMPLETED | OUTPATIENT
Start: 2020-05-02 | End: 2020-05-02

## 2020-05-02 RX ORDER — METOCLOPRAMIDE HCL 10 MG
10 TABLET ORAL ONCE
Refills: 0 | Status: COMPLETED | OUTPATIENT
Start: 2020-05-02 | End: 2020-05-02

## 2020-05-02 RX ADMIN — Medication 25 MILLIGRAM(S): at 20:53

## 2020-05-02 RX ADMIN — Medication 975 MILLIGRAM(S): at 20:38

## 2020-05-02 RX ADMIN — Medication 10 MILLIGRAM(S): at 20:45

## 2020-05-02 NOTE — PROGRESS NOTE ADULT - SUBJECTIVE AND OBJECTIVE BOX
, PPD 3 presents with persistent headache since 2020 on PPD 1 that radiates from shoulder up to the back of the head and now running down the front of her face /10. Pain is worsen to 9/10 when lying down. Denies blurry vision, epigastric pain.  Patient was IOL @ 37 wks on  secondary to cHTN controlled on labetalol. Reports worsen headache today with elevated blood pressure of 170's/100's at home as per visiting Nurse. Patient was discharged on labetalol 1000 mg BID with Procardia 30 mg daily. Today patient reports taking a total of 1500 mg of labetalol.    AP:  cHTN, IOL @ 37 wks  IP: COVID Positive, Urgent primary  2nd to CAT 2 tracing, s/p Magnesium sulfate 2nd to elevated blood pressures  PP: Discharged , with persistent headache, labetalol 1000mg BID and Procardia 30 mg daily      Lungs: clear  DTr: wnl  VE: 144/97, 137/92, 143/93  , PPD 3 presents with persistent headache since 2020 on PPD 1 that radiates from shoulder up to the back of the head and now running down the front of her face /10. Pain is worsen to 9/10 when lying down. Denies blurry vision, epigastric pain.  Patient was IOL @ 37 wks on  secondary to cHTN controlled on labetalol. Reports worsen headache today with elevated blood pressure of 170's/100's at home as per visiting Nurse. Patient was discharged on labetalol 1000 mg BID with Procardia 30 mg daily. Today patient reports taking a total of 1500 mg of labetalol.    AP:  cHTN, IOL @ 37 wks  IP: COVID Positive, Urgent primary  2nd to CAT 2 tracing, s/p Magnesium sulfate 2nd to elevated blood pressures  PP: Discharged , with persistent headache, labetalol 1000mg BID and Procardia 30 mg daily      Lungs: clear  DTr: wnl  VE: 144/97, 137/92, 143/93  Incision site: clean and dry

## 2020-05-02 NOTE — PROGRESS NOTE ADULT - ASSESSMENT
, PPD 3 presents with persistent headache since 2020 on PPD 1 that radiates from shoulder up to the back of the head and now running down the front of her face 7/10. Pain is worsen to 9/10 when lying down. Denies blurry vision, epigastric pain.  Patient was IOL @ 37 wks on  secondary to cHTN controlled on labetalol. Reports worsen headache today with elevated blood pressure of 170's/100's at home as per visiting Nurse. Patient was discharged on labetalol 1000 mg BID with Procardia 30 mg daily. Today patient reports taking a total of 1500 mg of labetalol.    AP:  cHTN, IOL @ 37 wks  IP: COVID-19 Positive since 3/2020, Urgent primary  2nd to CAT 2 tracing, s/p Magnesium sulfate 2nd to elevated blood pressures  PP: Discharged , with persistent headache, labetalol 1000mg BID and Procardia 30 mg daily      Lungs: clear  DTr: wnl  VE: 144/97, 137/92, 143/93    Plan:   PEC labs  Tylenol  Reglan  Benedryl  Anesthesia consult r/o Epidural HA    @ 8:48 pm  Anesthesia present in room, symptoms not consistent with spinal HA, written note in chart  , PPD 3 presents with persistent headache since 2020 on PPD 1 that radiates from shoulder up to the back of the head and now running down the front of her face 7/10. Pain is worsen to 9/10 when lying down. Denies blurry vision, epigastric pain.  Patient was IOL @ 37 wks on  secondary to cHTN controlled on labetalol. Reports worsen headache today with elevated blood pressure of 170's/100's at home as per visiting Nurse. Patient was discharged on labetalol 1000 mg BID with Procardia 30 mg daily. Today patient reports taking a total of 1500 mg of labetalol.    AP:  cHTN, IOL @ 37 wks  IP: COVID-19 Positive since 3/2020, Urgent primary  2nd to CAT 2 tracing, s/p Magnesium sulfate 2nd to elevated blood pressures  PP: Discharged , with persistent headache, labetalol 1000mg BID and Procardia 30 mg daily      Lungs: clear  DTr: wnl  VE: 144/97, 137/92, 143/93    Plan:   PEC labs  Tylenol  Reglan  Benedryl  Anesthesia consult r/o Epidural HA    @ 8:48 pm  Anesthesia present in room, symptoms not consistent with spinal HA, written note in chart    @10:41 pm                          8.8    8.31  )-----------( 306      ( 02 May 2020 21:10 )             26.0     05-02    141  |  106  |  10  ----------------------------<  89  4.4   |  22  |  0.72    Ca    9.7      02 May 2020 21:10    TPro  7.2  /  Alb  3.4  /  TBili  < 0.2<L>  /  DBili  x   /  AST  26  /  ALT  12  /  AlkPhos  109  05-02          Urinalysis Basic - ( 02 May 2020 21:10 )    Color: COLORLESS / Appearance: CLEAR / S.008 / pH: 7.5  Gluc: NEGATIVE / Ketone: NEGATIVE  / Bili: NEGATIVE / Urobili: NORMAL   Blood: TRACE / Protein: NEGATIVE / Nitrite: NEGATIVE   Leuk Esterase: NEGATIVE / RBC: 0-2 / WBC x   Sq Epi: OCC / Non Sq Epi: x / Bacteria: x      PT/INR - ( 02 May 2020 21:10 )   PT: 10.5 SEC;   INR: 0.92          PTT - ( 02 May 2020 21:10 )  PTT:28.7 SEC    protein Protein, Random Urine: < 4.0 mg/dL (20 @ 21:10)    Vital Signs Last 24 Hrs  T(C): 37.3 (02 May 2020 20:24), Max: 37.3 (02 May 2020 20:24)  T(F): 99.1 (02 May 2020 20:24), Max: 99.1 (02 May 2020 20:24)  HR: 78 (02 May 2020 22:10) (78 - 118)  BP: 149/85 (02 May 2020 22:10) (137/92 - 157/75)  BP(mean): --  RR: 17 (02 May 2020 22:10) (16 - 20)  SpO2: 99% (02 May 2020 22:10) (97% - 100%)    Patient reports HA now 3/10 on pain scale  Discussed with Dr. Larsen  Patient to follow up in office within 2 days, continue Visiting Nurse and Labetalol 1000 mg BID, Procardia 30 mg daily

## 2020-05-02 NOTE — ED ADULT TRIAGE NOTE - CHIEF COMPLAINT QUOTE
pt had c section on the 29th of april. pt was d/catalina yesterday  pt had preeclampsia during pregnancy/  pt c/o headache and pain from back  of neck over shoulders and headache not going away   pt taking labetalol 500 bid and procardia 30 q day

## 2020-05-02 NOTE — PROVIDER CONTACT NOTE (OTHER) - ASSESSMENT
patient denies any dizziness, lightheadedness upon sitting and standing. Bleeding is light. Fundus is firm, at umbilicus, midline. Patient is afebrile (oral temperature 37.1)

## 2020-05-03 PROBLEM — E11.9 TYPE 2 DIABETES MELLITUS WITHOUT COMPLICATIONS: Chronic | Status: ACTIVE | Noted: 2020-04-26

## 2020-05-03 PROBLEM — U07.1 COVID-19: Chronic | Status: ACTIVE | Noted: 2020-04-26

## 2020-05-04 ENCOUNTER — EMERGENCY (EMERGENCY)
Facility: HOSPITAL | Age: 35
LOS: 1 days | Discharge: STILL ADMITTED OR ADMINISTRATIVE | End: 2020-05-04
Admitting: EMERGENCY MEDICINE

## 2020-05-04 ENCOUNTER — INPATIENT (INPATIENT)
Facility: HOSPITAL | Age: 35
LOS: 1 days | Discharge: HOME HEALTH | End: 2020-05-06
Attending: OBSTETRICS & GYNECOLOGY | Admitting: OBSTETRICS & GYNECOLOGY
Payer: COMMERCIAL

## 2020-05-04 VITALS
DIASTOLIC BLOOD PRESSURE: 78 MMHG | RESPIRATION RATE: 14 BRPM | OXYGEN SATURATION: 100 % | HEART RATE: 74 BPM | SYSTOLIC BLOOD PRESSURE: 143 MMHG

## 2020-05-04 VITALS
OXYGEN SATURATION: 97 % | RESPIRATION RATE: 18 BRPM | SYSTOLIC BLOOD PRESSURE: 133 MMHG | HEART RATE: 77 BPM | HEIGHT: 65 IN | TEMPERATURE: 99 F | DIASTOLIC BLOOD PRESSURE: 87 MMHG

## 2020-05-04 DIAGNOSIS — O10.013 PRE-EXISTING ESSENTIAL HYPERTENSION COMPLICATING PREGNANCY, THIRD TRIMESTER: ICD-10-CM

## 2020-05-04 DIAGNOSIS — Z98.84 BARIATRIC SURGERY STATUS: Chronic | ICD-10-CM

## 2020-05-04 DIAGNOSIS — Z98.890 OTHER SPECIFIED POSTPROCEDURAL STATES: Chronic | ICD-10-CM

## 2020-05-04 DIAGNOSIS — O09.523 SUPERVISION OF ELDERLY MULTIGRAVIDA, THIRD TRIMESTER: ICD-10-CM

## 2020-05-04 DIAGNOSIS — O14.10 SEVERE PRE-ECLAMPSIA, UNSPECIFIED TRIMESTER: ICD-10-CM

## 2020-05-04 DIAGNOSIS — O40.3XX0 POLYHYDRAMNIOS, THIRD TRIMESTER, NOT APPLICABLE OR UNSPECIFIED: ICD-10-CM

## 2020-05-04 DIAGNOSIS — O14.90 UNSPECIFIED PRE-ECLAMPSIA, UNSPECIFIED TRIMESTER: ICD-10-CM

## 2020-05-04 DIAGNOSIS — O24.113 PRE-EXISTING TYPE 2 DIABETES MELLITUS, IN PREGNANCY, THIRD TRIMESTER: ICD-10-CM

## 2020-05-04 LAB
ALBUMIN SERPL ELPH-MCNC: 3.5 G/DL — SIGNIFICANT CHANGE UP (ref 3.3–5)
ALP SERPL-CCNC: 102 U/L — SIGNIFICANT CHANGE UP (ref 40–120)
ALT FLD-CCNC: 12 U/L — SIGNIFICANT CHANGE UP (ref 4–33)
ANION GAP SERPL CALC-SCNC: 16 MMO/L — HIGH (ref 7–14)
APTT BLD: 27.7 SEC — SIGNIFICANT CHANGE UP (ref 27.5–36.3)
AST SERPL-CCNC: 20 U/L — SIGNIFICANT CHANGE UP (ref 4–32)
BASOPHILS # BLD AUTO: 0.03 K/UL — SIGNIFICANT CHANGE UP (ref 0–0.2)
BASOPHILS NFR BLD AUTO: 0.3 % — SIGNIFICANT CHANGE UP (ref 0–2)
BILIRUB SERPL-MCNC: < 0.2 MG/DL — LOW (ref 0.2–1.2)
BUN SERPL-MCNC: 8 MG/DL — SIGNIFICANT CHANGE UP (ref 7–23)
CALCIUM SERPL-MCNC: 9.3 MG/DL — SIGNIFICANT CHANGE UP (ref 8.4–10.5)
CHLORIDE SERPL-SCNC: 105 MMOL/L — SIGNIFICANT CHANGE UP (ref 98–107)
CO2 SERPL-SCNC: 18 MMOL/L — LOW (ref 22–31)
CREAT SERPL-MCNC: 0.69 MG/DL — SIGNIFICANT CHANGE UP (ref 0.5–1.3)
EOSINOPHIL # BLD AUTO: 0.31 K/UL — SIGNIFICANT CHANGE UP (ref 0–0.5)
EOSINOPHIL NFR BLD AUTO: 3.5 % — SIGNIFICANT CHANGE UP (ref 0–6)
FIBRINOGEN PPP-MCNC: 582 MG/DL — HIGH (ref 300–520)
GLUCOSE SERPL-MCNC: 80 MG/DL — SIGNIFICANT CHANGE UP (ref 70–99)
HCT VFR BLD CALC: 26.9 % — LOW (ref 34.5–45)
HGB BLD-MCNC: 9 G/DL — LOW (ref 11.5–15.5)
IMM GRANULOCYTES NFR BLD AUTO: 0.9 % — SIGNIFICANT CHANGE UP (ref 0–1.5)
INR BLD: 0.91 — SIGNIFICANT CHANGE UP (ref 0.88–1.17)
LDH SERPL L TO P-CCNC: 283 U/L — HIGH (ref 135–225)
LYMPHOCYTES # BLD AUTO: 2.35 K/UL — SIGNIFICANT CHANGE UP (ref 1–3.3)
LYMPHOCYTES # BLD AUTO: 26.5 % — SIGNIFICANT CHANGE UP (ref 13–44)
MCHC RBC-ENTMCNC: 28.6 PG — SIGNIFICANT CHANGE UP (ref 27–34)
MCHC RBC-ENTMCNC: 33.5 % — SIGNIFICANT CHANGE UP (ref 32–36)
MCV RBC AUTO: 85.4 FL — SIGNIFICANT CHANGE UP (ref 80–100)
MONOCYTES # BLD AUTO: 0.87 K/UL — SIGNIFICANT CHANGE UP (ref 0–0.9)
MONOCYTES NFR BLD AUTO: 9.8 % — SIGNIFICANT CHANGE UP (ref 2–14)
NEUTROPHILS # BLD AUTO: 5.23 K/UL — SIGNIFICANT CHANGE UP (ref 1.8–7.4)
NEUTROPHILS NFR BLD AUTO: 59 % — SIGNIFICANT CHANGE UP (ref 43–77)
NRBC # FLD: 0 K/UL — SIGNIFICANT CHANGE UP (ref 0–0)
PLATELET # BLD AUTO: 394 K/UL — SIGNIFICANT CHANGE UP (ref 150–400)
PMV BLD: 11 FL — SIGNIFICANT CHANGE UP (ref 7–13)
POTASSIUM SERPL-MCNC: 4.6 MMOL/L — SIGNIFICANT CHANGE UP (ref 3.5–5.3)
POTASSIUM SERPL-SCNC: 4.6 MMOL/L — SIGNIFICANT CHANGE UP (ref 3.5–5.3)
PROT SERPL-MCNC: 7.4 G/DL — SIGNIFICANT CHANGE UP (ref 6–8.3)
PROTHROM AB SERPL-ACNC: 10.4 SEC — SIGNIFICANT CHANGE UP (ref 9.8–13.1)
RBC # BLD: 3.15 M/UL — LOW (ref 3.8–5.2)
RBC # FLD: 14.6 % — HIGH (ref 10.3–14.5)
SODIUM SERPL-SCNC: 139 MMOL/L — SIGNIFICANT CHANGE UP (ref 135–145)
URATE SERPL-MCNC: 7.5 MG/DL — HIGH (ref 2.5–7)
WBC # BLD: 8.87 K/UL — SIGNIFICANT CHANGE UP (ref 3.8–10.5)
WBC # FLD AUTO: 8.87 K/UL — SIGNIFICANT CHANGE UP (ref 3.8–10.5)

## 2020-05-04 PROCEDURE — 70450 CT HEAD/BRAIN W/O DYE: CPT | Mod: 26

## 2020-05-04 RX ORDER — SIMETHICONE 80 MG/1
80 TABLET, CHEWABLE ORAL EVERY 4 HOURS
Refills: 0 | Status: DISCONTINUED | OUTPATIENT
Start: 2020-05-04 | End: 2020-05-06

## 2020-05-04 RX ORDER — DIPHENHYDRAMINE HCL 50 MG
25 CAPSULE ORAL ONCE
Refills: 0 | Status: COMPLETED | OUTPATIENT
Start: 2020-05-04 | End: 2020-05-04

## 2020-05-04 RX ORDER — GABAPENTIN 400 MG/1
100 CAPSULE ORAL THREE TIMES A DAY
Refills: 0 | Status: DISCONTINUED | OUTPATIENT
Start: 2020-05-04 | End: 2020-05-06

## 2020-05-04 RX ORDER — SODIUM CHLORIDE 9 MG/ML
1000 INJECTION, SOLUTION INTRAVENOUS
Refills: 0 | Status: DISCONTINUED | OUTPATIENT
Start: 2020-05-04 | End: 2020-05-05

## 2020-05-04 RX ORDER — ACETAMINOPHEN 500 MG
975 TABLET ORAL ONCE
Refills: 0 | Status: DISCONTINUED | OUTPATIENT
Start: 2020-05-04 | End: 2020-05-05

## 2020-05-04 RX ORDER — MAGNESIUM HYDROXIDE 400 MG/1
30 TABLET, CHEWABLE ORAL
Refills: 0 | Status: DISCONTINUED | OUTPATIENT
Start: 2020-05-04 | End: 2020-05-06

## 2020-05-04 RX ORDER — METOCLOPRAMIDE HCL 10 MG
10 TABLET ORAL ONCE
Refills: 0 | Status: COMPLETED | OUTPATIENT
Start: 2020-05-04 | End: 2020-05-04

## 2020-05-04 RX ORDER — IBUPROFEN 200 MG
600 TABLET ORAL EVERY 6 HOURS
Refills: 0 | Status: DISCONTINUED | OUTPATIENT
Start: 2020-05-04 | End: 2020-05-06

## 2020-05-04 RX ORDER — OXYCODONE HYDROCHLORIDE 5 MG/1
5 TABLET ORAL
Refills: 0 | Status: DISCONTINUED | OUTPATIENT
Start: 2020-05-04 | End: 2020-05-06

## 2020-05-04 RX ORDER — MAGNESIUM SULFATE 500 MG/ML
2 VIAL (ML) INJECTION
Qty: 40 | Refills: 0 | Status: DISCONTINUED | OUTPATIENT
Start: 2020-05-04 | End: 2020-05-05

## 2020-05-04 RX ORDER — LABETALOL HCL 100 MG
300 TABLET ORAL EVERY 8 HOURS
Refills: 0 | Status: DISCONTINUED | OUTPATIENT
Start: 2020-05-04 | End: 2020-05-06

## 2020-05-04 RX ORDER — MAGNESIUM SULFATE 500 MG/ML
4 VIAL (ML) INJECTION ONCE
Refills: 0 | Status: COMPLETED | OUTPATIENT
Start: 2020-05-04 | End: 2020-05-04

## 2020-05-04 RX ORDER — OXYCODONE HYDROCHLORIDE 5 MG/1
5 TABLET ORAL ONCE
Refills: 0 | Status: DISCONTINUED | OUTPATIENT
Start: 2020-05-04 | End: 2020-05-06

## 2020-05-04 RX ORDER — METFORMIN HYDROCHLORIDE 850 MG/1
500 TABLET ORAL
Refills: 0 | Status: DISCONTINUED | OUTPATIENT
Start: 2020-05-04 | End: 2020-05-06

## 2020-05-04 RX ORDER — ALBUTEROL 90 UG/1
2 AEROSOL, METERED ORAL EVERY 6 HOURS
Refills: 0 | Status: DISCONTINUED | OUTPATIENT
Start: 2020-05-04 | End: 2020-05-06

## 2020-05-04 RX ORDER — NIFEDIPINE 30 MG
60 TABLET, EXTENDED RELEASE 24 HR ORAL AT BEDTIME
Refills: 0 | Status: DISCONTINUED | OUTPATIENT
Start: 2020-05-04 | End: 2020-05-06

## 2020-05-04 RX ORDER — ACETAMINOPHEN 500 MG
975 TABLET ORAL
Refills: 0 | Status: DISCONTINUED | OUTPATIENT
Start: 2020-05-04 | End: 2020-05-06

## 2020-05-04 RX ORDER — HEPARIN SODIUM 5000 [USP'U]/ML
10000 INJECTION INTRAVENOUS; SUBCUTANEOUS EVERY 12 HOURS
Refills: 0 | Status: DISCONTINUED | OUTPATIENT
Start: 2020-05-04 | End: 2020-05-06

## 2020-05-04 RX ADMIN — Medication 650 MILLIGRAM(S): at 23:30

## 2020-05-04 RX ADMIN — Medication 200 GRAM(S): at 17:20

## 2020-05-04 RX ADMIN — Medication 50 GM/HR: at 17:55

## 2020-05-04 RX ADMIN — Medication 60 MILLIGRAM(S): at 22:38

## 2020-05-04 RX ADMIN — Medication 300 MILLIGRAM(S): at 17:31

## 2020-05-04 RX ADMIN — METFORMIN HYDROCHLORIDE 500 MILLIGRAM(S): 850 TABLET ORAL at 23:24

## 2020-05-04 RX ADMIN — Medication 10 MILLIGRAM(S): at 17:15

## 2020-05-04 RX ADMIN — GABAPENTIN 100 MILLIGRAM(S): 400 CAPSULE ORAL at 23:24

## 2020-05-04 RX ADMIN — Medication 50 GM/HR: at 19:22

## 2020-05-04 RX ADMIN — Medication 25 MILLIGRAM(S): at 17:15

## 2020-05-04 RX ADMIN — HEPARIN SODIUM 10000 UNIT(S): 5000 INJECTION INTRAVENOUS; SUBCUTANEOUS at 18:00

## 2020-05-04 RX ADMIN — SODIUM CHLORIDE 50 MILLILITER(S): 9 INJECTION, SOLUTION INTRAVENOUS at 22:42

## 2020-05-04 RX ADMIN — SODIUM CHLORIDE 50 MILLILITER(S): 9 INJECTION, SOLUTION INTRAVENOUS at 17:28

## 2020-05-04 NOTE — H&P ADULT - NSHPPHYSICALEXAM_GEN_ALL_CORE
Gen: NAD  Neuro: cranial nerves grossly intact, motor/sensory function intact x4  Cards: RRR  Pulm: CTAB  Abd: soft, non-tender, non-distended  Incision: c/d/i with dermabond in place  Ext: warm, well perfused, no edema

## 2020-05-04 NOTE — PROGRESS NOTE ADULT - SUBJECTIVE AND OBJECTIVE BOX
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

## 2020-05-04 NOTE — H&P ADULT - ASSESSMENT
33 y/o  s/p pLTCS for NRFHT on 20, now POD#5, presenting with persistent HA and elevated BPs at home. Patient with intermittent severe range BPs.

## 2020-05-04 NOTE — H&P ADULT - PROBLEM SELECTOR PLAN 1
- admit  - Mag infusion  - Labetalol 300 TID, Procardia 60 QHS  - stat HELLP labs  - AM HELLP labs  - CT head  - HSQ 10k BID for BMI  - Regular diet  - LR@50 while on Mag  - anesthesia eval for postdural puncture headache  - albuterol PRN  - metformin 500 BID    DOREEN Christy PGY2  d/w Dr. Wilson

## 2020-05-04 NOTE — ED ADULT TRIAGE NOTE - CHIEF COMPLAINT QUOTE
A&OX3 s/p  delivery five days ago pt reports headache, dizziness, pressure in head, took 1300mg of Tylenol prior to arrival denies n/v/d cp A&OX3 s/p  delivery five days ago pt reports headache, dizziness, pressure in head, took 1300mg of Tylenol prior to arrival as per EMS pt BP was 210/110 spoke to triage pt sent to L&D tracieies n/v/d cp

## 2020-05-04 NOTE — H&P ADULT - HISTORY OF PRESENT ILLNESS
33 y/o  s/p pLTCS for NRFHT on 20, now POD#5, presenting with persistent HA and elevated BPs at home.     OBhx: 2020 IOL at 37w for cHTN, pLTCS @37w4d for NRFHT 35 y/o  s/p pLTCS for NRFHT on 20, now POD#5, presenting with persistent HA and elevated BPs at home. Patient reports HA that has been persistent since delivery. Occipital location with intermittent radiation to neck now frontal as well. Not responsive to PO Tylenol. Patient has not been taking Motrin or Oxy. Reports HA is worse with sitting and standing. Associated with blurry vision. Reports pain makes BPs worse. Patient read online about post dural puncture headache and reports improvement with caffeine intake. Patient denies chest pain, shortness of breath, increasing edema. Patient has cHTN was on Labetalol 500 BID and Procardia 30 QD during pregnancy and after delivery. She reports elevated BPs in the AM prior to Labetalol dose since delivery. Reports improvement to non-severe range after taking morning dose. Patient previous tested COVID positive. Denies fevers, chills, cough, URI symptoms currently. Additional ROS negative. Minimal lochia. No abdominal pain, drainage from incision.     OBhx:  pLTCS for NRFHT, c/b A1, siPEC/Mg  GYNHx: PCOS, HSV2, hx of chlamydia, denies fibroids, cysts, abnormal paps  PMH: cHTN, PCOS, asthma (well controlled, albuterol PRN only with URIs), DM2 (resolved per pt  PSH: pLTCS, rotator cuff surgery, gastric bypass   Meds: Labetalol 500 BID, Procardia 30 QD, Tylenol PRN  All: NKDA  Soc: no substance use, anxiety/depression    accepts blood

## 2020-05-05 ENCOUNTER — TRANSCRIPTION ENCOUNTER (OUTPATIENT)
Age: 35
End: 2020-05-05

## 2020-05-05 LAB
ALBUMIN SERPL ELPH-MCNC: 3.5 G/DL — SIGNIFICANT CHANGE UP (ref 3.3–5)
ALP SERPL-CCNC: 98 U/L — SIGNIFICANT CHANGE UP (ref 40–120)
ALT FLD-CCNC: 10 U/L — SIGNIFICANT CHANGE UP (ref 4–33)
ANION GAP SERPL CALC-SCNC: 14 MMO/L — SIGNIFICANT CHANGE UP (ref 7–14)
APTT BLD: 28.9 SEC — SIGNIFICANT CHANGE UP (ref 27.5–36.3)
AST SERPL-CCNC: 12 U/L — SIGNIFICANT CHANGE UP (ref 4–32)
BASOPHILS # BLD AUTO: 0.03 K/UL — SIGNIFICANT CHANGE UP (ref 0–0.2)
BASOPHILS NFR BLD AUTO: 0.4 % — SIGNIFICANT CHANGE UP (ref 0–2)
BILIRUB SERPL-MCNC: < 0.2 MG/DL — LOW (ref 0.2–1.2)
BUN SERPL-MCNC: 7 MG/DL — SIGNIFICANT CHANGE UP (ref 7–23)
CALCIUM SERPL-MCNC: 8.3 MG/DL — LOW (ref 8.4–10.5)
CHLORIDE SERPL-SCNC: 102 MMOL/L — SIGNIFICANT CHANGE UP (ref 98–107)
CO2 SERPL-SCNC: 23 MMOL/L — SIGNIFICANT CHANGE UP (ref 22–31)
CREAT SERPL-MCNC: 0.66 MG/DL — SIGNIFICANT CHANGE UP (ref 0.5–1.3)
EOSINOPHIL # BLD AUTO: 0.33 K/UL — SIGNIFICANT CHANGE UP (ref 0–0.5)
EOSINOPHIL NFR BLD AUTO: 4.4 % — SIGNIFICANT CHANGE UP (ref 0–6)
FIBRINOGEN PPP-MCNC: 606 MG/DL — HIGH (ref 300–520)
GLUCOSE SERPL-MCNC: 99 MG/DL — SIGNIFICANT CHANGE UP (ref 70–99)
HCT VFR BLD CALC: 27.4 % — LOW (ref 34.5–45)
HGB BLD-MCNC: 9.1 G/DL — LOW (ref 11.5–15.5)
IMM GRANULOCYTES NFR BLD AUTO: 0.9 % — SIGNIFICANT CHANGE UP (ref 0–1.5)
INR BLD: 0.93 — SIGNIFICANT CHANGE UP (ref 0.88–1.17)
LDH SERPL L TO P-CCNC: 191 U/L — SIGNIFICANT CHANGE UP (ref 135–225)
LYMPHOCYTES # BLD AUTO: 2.13 K/UL — SIGNIFICANT CHANGE UP (ref 1–3.3)
LYMPHOCYTES # BLD AUTO: 28.2 % — SIGNIFICANT CHANGE UP (ref 13–44)
MAGNESIUM SERPL-MCNC: 4.2 MG/DL — HIGH (ref 1.6–2.6)
MCHC RBC-ENTMCNC: 28.5 PG — SIGNIFICANT CHANGE UP (ref 27–34)
MCHC RBC-ENTMCNC: 33.2 % — SIGNIFICANT CHANGE UP (ref 32–36)
MCV RBC AUTO: 85.9 FL — SIGNIFICANT CHANGE UP (ref 80–100)
MONOCYTES # BLD AUTO: 0.74 K/UL — SIGNIFICANT CHANGE UP (ref 0–0.9)
MONOCYTES NFR BLD AUTO: 9.8 % — SIGNIFICANT CHANGE UP (ref 2–14)
NEUTROPHILS # BLD AUTO: 4.25 K/UL — SIGNIFICANT CHANGE UP (ref 1.8–7.4)
NEUTROPHILS NFR BLD AUTO: 56.3 % — SIGNIFICANT CHANGE UP (ref 43–77)
NRBC # FLD: 0 K/UL — SIGNIFICANT CHANGE UP (ref 0–0)
PLATELET # BLD AUTO: 413 K/UL — HIGH (ref 150–400)
PMV BLD: 9.9 FL — SIGNIFICANT CHANGE UP (ref 7–13)
POTASSIUM SERPL-MCNC: 4.3 MMOL/L — SIGNIFICANT CHANGE UP (ref 3.5–5.3)
POTASSIUM SERPL-SCNC: 4.3 MMOL/L — SIGNIFICANT CHANGE UP (ref 3.5–5.3)
PROT SERPL-MCNC: 7.1 G/DL — SIGNIFICANT CHANGE UP (ref 6–8.3)
PROTHROM AB SERPL-ACNC: 10.7 SEC — SIGNIFICANT CHANGE UP (ref 9.8–13.1)
RBC # BLD: 3.19 M/UL — LOW (ref 3.8–5.2)
RBC # FLD: 14.3 % — SIGNIFICANT CHANGE UP (ref 10.3–14.5)
SODIUM SERPL-SCNC: 139 MMOL/L — SIGNIFICANT CHANGE UP (ref 135–145)
URATE SERPL-MCNC: 7.2 MG/DL — HIGH (ref 2.5–7)
WBC # BLD: 7.55 K/UL — SIGNIFICANT CHANGE UP (ref 3.8–10.5)
WBC # FLD AUTO: 7.55 K/UL — SIGNIFICANT CHANGE UP (ref 3.8–10.5)

## 2020-05-05 RX ORDER — ACETAMINOPHEN 500 MG
650 TABLET ORAL ONCE
Refills: 0 | Status: COMPLETED | OUTPATIENT
Start: 2020-05-05 | End: 2020-05-04

## 2020-05-05 RX ORDER — ACETAMINOPHEN 500 MG
650 TABLET ORAL ONCE
Refills: 0 | Status: DISCONTINUED | OUTPATIENT
Start: 2020-05-05 | End: 2020-05-06

## 2020-05-05 RX ORDER — METFORMIN HYDROCHLORIDE 850 MG/1
1 TABLET ORAL
Qty: 0 | Refills: 0 | DISCHARGE
Start: 2020-05-05

## 2020-05-05 RX ORDER — LABETALOL HCL 100 MG
500 TABLET ORAL
Qty: 0 | Refills: 0 | DISCHARGE

## 2020-05-05 RX ORDER — LABETALOL HCL 100 MG
1 TABLET ORAL
Qty: 42 | Refills: 0
Start: 2020-05-05 | End: 2020-05-18

## 2020-05-05 RX ORDER — ALBUTEROL 90 UG/1
2 AEROSOL, METERED ORAL
Qty: 0 | Refills: 0 | DISCHARGE
Start: 2020-05-05

## 2020-05-05 RX ORDER — NIFEDIPINE 30 MG
1 TABLET, EXTENDED RELEASE 24 HR ORAL
Qty: 14 | Refills: 0
Start: 2020-05-05 | End: 2020-05-18

## 2020-05-05 RX ORDER — GABAPENTIN 400 MG/1
1 CAPSULE ORAL
Qty: 0 | Refills: 0 | DISCHARGE
Start: 2020-05-05

## 2020-05-05 RX ORDER — NIFEDIPINE 30 MG
1 TABLET, EXTENDED RELEASE 24 HR ORAL
Qty: 0 | Refills: 0 | DISCHARGE

## 2020-05-05 RX ADMIN — GABAPENTIN 100 MILLIGRAM(S): 400 CAPSULE ORAL at 14:07

## 2020-05-05 RX ADMIN — Medication 975 MILLIGRAM(S): at 10:25

## 2020-05-05 RX ADMIN — Medication 975 MILLIGRAM(S): at 18:13

## 2020-05-05 RX ADMIN — Medication 60 MILLIGRAM(S): at 22:08

## 2020-05-05 RX ADMIN — METFORMIN HYDROCHLORIDE 500 MILLIGRAM(S): 850 TABLET ORAL at 10:24

## 2020-05-05 RX ADMIN — METFORMIN HYDROCHLORIDE 500 MILLIGRAM(S): 850 TABLET ORAL at 22:08

## 2020-05-05 RX ADMIN — Medication 300 MILLIGRAM(S): at 01:40

## 2020-05-05 RX ADMIN — Medication 300 MILLIGRAM(S): at 18:13

## 2020-05-05 RX ADMIN — GABAPENTIN 100 MILLIGRAM(S): 400 CAPSULE ORAL at 22:08

## 2020-05-05 RX ADMIN — Medication 300 MILLIGRAM(S): at 10:24

## 2020-05-05 RX ADMIN — HEPARIN SODIUM 10000 UNIT(S): 5000 INJECTION INTRAVENOUS; SUBCUTANEOUS at 06:06

## 2020-05-05 RX ADMIN — GABAPENTIN 100 MILLIGRAM(S): 400 CAPSULE ORAL at 06:07

## 2020-05-05 NOTE — PROGRESS NOTE ADULT - PROBLEM SELECTOR PLAN 1
- d/c magnesium sulfate at 5a  - continue to monitor BPs and sxs  - per Anesthesia (Dr. Houston), pt likely has a spinal HA but is declining blood patch at this time  - continue magnesium sulfate and neurontin for HA per anesthesia  - AM HELLP labs pending  - f/u head CT final read    Sudha Torres MD PGY3

## 2020-05-05 NOTE — PROGRESS NOTE ADULT - ASSESSMENT
35 y/o  s/p pLTCS for NRFHT on 20, now POD#6, presenting with persistent HA and elevated BPs at home. Patient with intermittent severe range BPs.

## 2020-05-05 NOTE — DISCHARGE NOTE NURSING/CASE MANAGEMENT/SOCIAL WORK - PATIENT PORTAL LINK FT
You can access the FollowMyHealth Patient Portal offered by [facility] by registering at the following website: [email or web address]. By joining [facility]’s FollowMyHealth portal, you will also be able to view your health information using other applications (apps) compatible with our system.

## 2020-05-05 NOTE — DISCHARGE NOTE PROVIDER - NSDCFUADDAPPT_GEN_ALL_CORE_FT
- Continue BP meds as prescribed (hold is BP is under 110/60 or HR is under 60)  -Take blood pressure with at home cuff prior to taking medications; if BP is >150/90 call MD  - Return to hospital with headaches, visual changes, abdominal pain, nausea, vomiting, chest pain or shortness of breathe  - Follow up with OB in 2 days for BP check  - Follow up with [facility] (call [phone])

## 2020-05-05 NOTE — DISCHARGE NOTE PROVIDER - CARE PROVIDER_API CALL
Carlos Lambert)  Obstetrics and Gynecology  [address] [place] [address]  Phone: [phone]  Fax: [phone]  Follow Up Time:

## 2020-05-05 NOTE — DISCHARGE NOTE PROVIDER - NSDCFUADDINST_GEN_ALL_CORE_FT
You must self quarantine to complete a 14 day time period from the date of your diagnosis.  Monitor for fevers, shortness of breath and cough primarily.  Monitor your temperature daily to not any changes and increases.   It has been determined that you no longer need hospitalization and can recover while remaining in self-quarantine at home. You should follow the prevention steps below until a healthcare provider or local or state health department says you can return to your normal activities.  1. You should restrict activities outside your home, except for getting medical care.  2. Do not go to work, school, or public areas.  3. Avoid using public transportation, ride-sharing, or taxis.  4. Separate yourself from other people and animals in your home.  5. Call ahead before visiting your doctor.  6. Wear a facemask.  7. Cover your coughs and sneezes.  8. Clean your hands often.  9. Avoid sharing personal household items.    10. Clean all “high-touch” surfaces everyday.11. Monitor your symptoms.  If you have a medical emergency and need to call 911, notify the dispatch personnel that you have COVID-19 If possible, put on a facemask before emergency medical services arrive.  12. Stopping home isolation.  Patients with confirmed COVID-19 should remain under home isolation precautions for 14 days since the positive COVID-19 test and until the risk of secondary transmission to others is thought to be low. The decision to discontinue home isolation precautions should be made on a case-by-case basis, in consultation with healthcare providers and state and local health departments. Your [facility] Department of Health can be reached at [phone] for further information about COVID-19.

## 2020-05-05 NOTE — DISCHARGE NOTE PROVIDER - NSDCCPCAREPLAN_GEN_ALL_CORE_FT
PRINCIPAL DISCHARGE DIAGNOSIS  Diagnosis: Pre-eclampsia, postpartum  Assessment and Plan of Treatment: - Continue BP meds as prescribed (hold is BP is under 110/60 or HR is under 60)  -Take blood pressure with at home cuff prior to taking medications; if BP is >150/90 call MD  - Return to hospital with headaches, visual changes, abdominal pain, nausea, vomiting, chest pain or shortness of breathe  - Follow up with OB in 2 days for BP check  - Follow up with [facility] (call [phone]) PRINCIPAL DISCHARGE DIAGNOSIS  Diagnosis: Pre-eclampsia, postpartum  Assessment and Plan of Treatment: - Continue BP meds as prescribed (hold is BP is under 110/60 or HR is under 60)  - Continue Tylenol and Neurontin for headache (neurontin dosing: three times a day for first week, two times a day for second week, and daily for third week per anesethesia)  -Take blood pressure with at home cuff prior to taking medications; if BP is >150/90 call MD  - Return to hospital with headaches, visual changes, abdominal pain, nausea, vomiting, chest pain or shortness of breathe  - Follow up with OB in 2 days for BP check  - Follow up with [facility] (call [phone])

## 2020-05-05 NOTE — DISCHARGE NOTE NURSING/CASE MANAGEMENT/SOCIAL WORK - NSSCTYPOFSERV_GEN_ALL_CORE
Nurse will call patient to schedule virtual visit with patient. Nurse will call patient to schedule virtual visit  for the day of discharge.

## 2020-05-05 NOTE — DISCHARGE NOTE PROVIDER - HOSPITAL COURSE
33 y/o  s/p pLTCS for NRFHT on 20, now POD#6, presenting with persistent HA and elevated BPs at home. Patient with intermittent severe range BPs.     - continue to monitor BPs and sxs    - per Anesthesia (Dr. Houston), pt likely has a spinal HA but is declining blood patch at this time    - continue magnesium sulfate and neurontin for HA per anesthesia    - AM HELLP labs pending 35 y/o  s/p pLTCS for NRFHT on 20, now POD#6, presenting with persistent HA and elevated BPs at home. Patient with intermittent severe range BPs.     Per Anesthesia (Dr. Houston), pt likely has a spinal HA but is declining blood patch at this time. HELLP labs WNL. Patient given MgSo4 for 12 hour and medications titrated for optimum  BP control. Pt is now on labetalol 300 TID and procardia 60XL, BP now WNL. CT head: Normal noncontrast head CT scan. Pt now denies headache, visual changres, RUQ pain, N/V. Pt is stable and ready for discharge home with close outpatient follow up in two day in OB office, home BP monitoring and continue on new BP medication regimen. 33 y/o  s/p pLTCS for NRFHT on 20, now POD#6, presenting with persistent HA and elevated BPs at home. Patient with intermittent severe range BPs.     Per Anesthesia (Dr. Houston), pt likely has a spinal HA but is declining blood patch at this time. HELLP labs WNL. Patient given MgSo4 for 12 hour and medications titrated for optimum  BP control. Pt is now on labetalol 300 TID and procardia 60XL, BP now WNL. CT head: Normal noncontrast head CT scan. Pt now denies headache, visual changres, RUQ pain, N/V. Pt is stable and ready for discharge home with close outpatient follow up in two day in OB office, home BP monitoring and continue on new BP medication regimen. 35 y/o  s/p pLTCS for NRFHT on 20, now POD#6, presenting with persistent HA and elevated BPs at home. Patient with intermittent severe range BPs.     Per Anesthesia (Dr. Houston), pt likely has a spinal HA but is declining blood patch at this time.  Pt reassessed by Dr. Asia morgan; advised to continue tylenol and Neurontin for a few more days.  If symptoms return she should return to L&D. HELLP labs WNL. Patient given MgSo4 for 12 hour and medications titrated for optimum  BP control. Pt is now on labetalol 300 TID and procardia 60XL, BP now WNL. CT head: Normal noncontrast head CT scan. Pt now denies persistant headache, visual changres, RUQ pain, N/V. Pt is stable and ready for discharge home with close outpatient follow up in two day in OB office, home BP monitoring and continue on new BP medication regimen.

## 2020-05-05 NOTE — PROGRESS NOTE ADULT - SUBJECTIVE AND OBJECTIVE BOX
Patient seen and examined at bedside. Pt reports she feels much better and that her HA is currently resolved. No acute complaints, pain well controlled. Patient is ambulating, voiding spontaneously, passing flatus, and tolerating regular diet. No severe range BPs overnight. Pt denies changes in vision, HA at present, or upper abdominal pain.    Vital Signs Last 24 Hours  T(C): 36.8 (05-04-20 @ 23:30), Max: 37.1 (05-04-20 @ 14:02)  HR: 81 (05-05-20 @ 00:00) (69 - 97)  BP: 131/78 (05-05-20 @ 00:00) (108/62 - 162/94)  RR: 21 (05-05-20 @ 00:00) (14 - 21)  SpO2: 100% (05-05-20 @ 00:00) (97% - 100%)    Physical Exam:  General: NAD  Abdomen: soft, nontender, non-distended, fundus firm  Pelvic: lochia wnl    Labs:  Blood Type: O Positive  Antibody Screen: --  RPR: Negative               9.0    8.87  )-----------( 394      ( 05-04 @ 16:15 )             26.9                8.8    8.31  )-----------( 306      ( 05-02 @ 21:10 )             26.0                8.1    8.86  )-----------( 212      ( 05-01 @ 06:17 )             24.4         MEDICATIONS  (STANDING):  acetaminophen   Tablet .. 975 milliGRAM(s) Oral <User Schedule>  gabapentin 100 milliGRAM(s) Oral three times a day  heparin   Injectable 09423 Unit(s) SubCutaneous every 12 hours  ibuprofen  Tablet. 600 milliGRAM(s) Oral every 6 hours  labetalol 300 milliGRAM(s) Oral every 8 hours  lactated ringers. 1000 milliLiter(s) (50 mL/Hr) IV Continuous <Continuous>  magnesium sulfate Infusion 2 Gm/Hr (50 mL/Hr) IV Continuous <Continuous>  metFORMIN 500 milliGRAM(s) Oral two times a day  NIFEdipine XL 60 milliGRAM(s) Oral at bedtime    MEDICATIONS  (PRN):  ALBUTerol    90 MICROgram(s) HFA Inhaler 2 Puff(s) Inhalation every 6 hours PRN Shortness of Breath and/or Wheezing  magnesium hydroxide Suspension 30 milliLiter(s) Oral two times a day PRN Constipation  oxyCODONE    IR 5 milliGRAM(s) Oral every 3 hours PRN Moderate to Severe Pain (4-10)  oxyCODONE    IR 5 milliGRAM(s) Oral once PRN Moderate to Severe Pain (4-10)  simethicone 80 milliGRAM(s) Chew every 4 hours PRN Gas    < from: CT Head No Cont (05.04.20 @ 22:20) >  ******PRELIMINARY REPORT******    ******PRELIMINARY REPORT******            EXAM:  CT BRAIN        PROCEDURE DATE:  May  4 2020     ******PRELIMINARY REPORT******    ******PRELIMINARY REPORT******            INTERPRETATION:  no urgent or emergent findings  f.u final read            ******PRELIMINARY REPORT******    ******PRELIMINARY REPORT******          MIKI ALDANA M.D., RADIOLOGY RESIDENT              < end of copied text >

## 2020-05-05 NOTE — DISCHARGE NOTE NURSING/CASE MANAGEMENT/SOCIAL WORK - NSDCFUADDAPPT_GEN_ALL_CORE_FT
- Continue BP meds as prescribed (hold is BP is under 110/60 or HR is under 60)  -Take blood pressure with at home cuff prior to taking medications; if BP is >150/90 call MD  - Return to hospital with headaches, visual changes, abdominal pain, nausea, vomiting, chest pain or shortness of breathe  - Follow up with OB in 2 days for BP check  - Follow up with [facility] (call [phone])

## 2020-05-05 NOTE — DISCHARGE NOTE PROVIDER - NSDCMRMEDTOKEN_GEN_ALL_CORE_FT
labetalol 100 mg oral tablet: 5 tab(s) orally 2 times a day   Procardia XL 30 mg oral tablet, extended release: 1 tab(s) orally once a day albuterol 90 mcg/inh inhalation aerosol: 2 puff(s) inhaled every 6 hours, As needed, Shortness of Breath and/or Wheezing  gabapentin 100 mg oral capsule: 1 cap(s) orally 3 times a day  labetalol 300 mg oral tablet: 1 tab(s) orally every 8 hours  hold if BP &lt;110/60  metFORMIN 500 mg oral tablet: 1 tab(s) orally 2 times a day  NIFEdipine 60 mg oral tablet, extended release: 1 tab(s) orally once a day (at bedtime)  hold if BP &lt;110/60 albuterol 90 mcg/inh inhalation aerosol: 2 puff(s) inhaled every 6 hours, As needed, Shortness of Breath and/or Wheezing  gabapentin 100 mg oral capsule: 1 cap(s) orally 3 times a day  Three times per day for one week  two times per day for second week  daily for third week  labetalol 300 mg oral tablet: 1 tab(s) orally every 8 hours  hold if BP &lt;110/60  metFORMIN 500 mg oral tablet: 1 tab(s) orally 2 times a day  NIFEdipine 60 mg oral tablet, extended release: 1 tab(s) orally once a day (at bedtime)  hold if BP &lt;110/60

## 2020-05-05 NOTE — CHART NOTE - NSCHARTNOTEFT_GEN_A_CORE
< from: CT Head No Cont (05.04.20 @ 22:20) >      ******PRELIMINARY REPORT******    ******PRELIMINARY REPORT******            EXAM:  CT BRAIN        PROCEDURE DATE:  May  4 2020     ******PRELIMINARY REPORT******    ******PRELIMINARY REPORT******            INTERPRETATION:  no urgent or emergent findings  f.u final read            ******PRELIMINARY REPORT******    ******PRELIMINARY REPORT******          MIKI ALDANA M.D., RADIOLOGY RESIDENT    < end of copied text >

## 2020-05-06 ENCOUNTER — APPOINTMENT (OUTPATIENT)
Dept: ANTEPARTUM | Facility: CLINIC | Age: 35
End: 2020-05-06

## 2020-05-06 VITALS
DIASTOLIC BLOOD PRESSURE: 92 MMHG | OXYGEN SATURATION: 100 % | HEART RATE: 84 BPM | SYSTOLIC BLOOD PRESSURE: 140 MMHG | TEMPERATURE: 99 F | RESPIRATION RATE: 18 BRPM

## 2020-05-06 DIAGNOSIS — G97.1 OTHER REACTION TO SPINAL AND LUMBAR PUNCTURE: ICD-10-CM

## 2020-05-06 RX ORDER — GABAPENTIN 400 MG/1
1 CAPSULE ORAL
Qty: 60 | Refills: 0
Start: 2020-05-06 | End: 2020-05-25

## 2020-05-06 RX ADMIN — Medication 300 MILLIGRAM(S): at 10:40

## 2020-05-06 RX ADMIN — Medication 975 MILLIGRAM(S): at 11:30

## 2020-05-06 RX ADMIN — Medication 300 MILLIGRAM(S): at 01:38

## 2020-05-06 RX ADMIN — METFORMIN HYDROCHLORIDE 500 MILLIGRAM(S): 850 TABLET ORAL at 10:40

## 2020-05-06 RX ADMIN — Medication 975 MILLIGRAM(S): at 07:22

## 2020-05-06 RX ADMIN — Medication 975 MILLIGRAM(S): at 00:10

## 2020-05-06 RX ADMIN — GABAPENTIN 100 MILLIGRAM(S): 400 CAPSULE ORAL at 07:21

## 2020-05-06 NOTE — PROGRESS NOTE ADULT - SUBJECTIVE AND OBJECTIVE BOX
Asked to see patient for possible postdural puncture headache.  Pt is approximately one week S/P  under epidural anesthesia.  She was also diagnosed preeclampsia.  Has complained of postural HA since relieved with tylenol and Neurontin.      Pt is currently comfortable, sitting up without HA.  She is advised to continue tylenol and Neurontin for a few more days.  If symptoms return she should return to L&D.

## 2020-05-06 NOTE — PROGRESS NOTE ADULT - PROBLEM SELECTOR PLAN 1
- s/p magnesium sulfate  - continue to monitor BPs and sxs, BPs well-controlled on current regimen of Labetalol 300 TID and Procardia XL 60 qd.  Home BP monitoring reviewed with patient

## 2020-05-06 NOTE — PROGRESS NOTE ADULT - SUBJECTIVE AND OBJECTIVE BOX
R4 OB Postpartum Note - HD#3    Patient seen and examined at bedside.  No acute events overnight.  Patient has no complaints and pain is well-controlled.  Patient reports postural headache overnight.  This morning she has no headache.  Patient reports intermittent difficulty focusing eyes after neurontin dose but denies visual scotoma and RUQ/epigastric tenderness.  Patient is tolerating regular diet, passing flatus, ambulating, and is voiding spontaneously.    Physical exam:    Vital Signs Last 24 Hrs  T(C): 37.4 (06 May 2020 01:36), Max: 37.8 (05 May 2020 18:00)  T(F): 99.4 (06 May 2020 01:36), Max: 100 (05 May 2020 18:00)  HR: 73 (06 May 2020 01:36) (73 - 88)  BP: 133/79 (06 May 2020 01:36) (121/69 - 141/93)  BP(mean): --  RR: 16 (06 May 2020 01:36) (16 - 17)  SpO2: 99% (06 May 2020 01:36) (97% - 100%)    Gen: NAD  Abdomen: Soft, appropriate post-op tenderness, non-distended, firm uterine fundus at umbilicus.  Ext: No calf tenderness    LABS:                        9.1    7.55  )-----------( 413      ( 05 May 2020 06:13 )             27.4                         9.0    8.87  )-----------( 394      ( 04 May 2020 16:15 )             26.9       20 @ 06:13      139  |  102  |  7   ----------------------------<  99  4.3   |  23  |  0.66    20 @ 16:15      139  |  105  |  8   ----------------------------<  80  4.6   |  18<L>  |  0.69        Ca    8.3<L>      05 May 2020 06:13  Ca    9.3      04 May 2020 16:15  Mg     4.2<H>         TPro  7.1  /  Alb  3.5  /  TBili  < 0.2<L>  /  DBili  x   /  AST  12  /  ALT  10  /  AlkPhos  98  20 @ 06:13  TPro  7.4  /  Alb  3.5  /  TBili  < 0.2<L>  /  DBili  x   /  AST  20  /  ALT  12  /  AlkPhos  102  20 @ 16:15      q      Assessment and Plan  34y POD  s/p  section meeting all appropriate post-op milestones  -Encourage Ambulation  -Continue with regular diet  -Heparin, SCDs, and ambulation for DVT ppx  -Analgesia as needed    CAYDEN Vora PGY4 R4 OB Postpartum Note - HD#3    Patient seen and examined at bedside.  No acute events overnight.  Patient has no complaints and pain is well-controlled.  Patient reports postural headache overnight.  This morning she has no headache.  Patient reports intermittent difficulty focusing eyes after neurontin dose but denies visual scotoma and RUQ/epigastric tenderness.  Patient is tolerating regular diet, passing flatus, ambulating, and is voiding spontaneously.    Physical exam:    Vital Signs Last 24 Hrs  T(C): 37.4 (06 May 2020 01:36), Max: 37.8 (05 May 2020 18:00)  T(F): 99.4 (06 May 2020 01:36), Max: 100 (05 May 2020 18:00)  HR: 73 (06 May 2020 01:36) (73 - 88)  BP: 133/79 (06 May 2020 01:36) (121/69 - 141/93)  BP(mean): --  RR: 16 (06 May 2020 01:36) (16 - 17)  SpO2: 99% (06 May 2020 01:36) (97% - 100%)    Gen: NAD  Abdomen: Soft, appropriate post-op tenderness, non-distended, firm uterine fundus at umbilicus.  Ext: No calf tenderness    LABS:                        9.1    7.55  )-----------( 413      ( 05 May 2020 06:13 )             27.4                         9.0    8.87  )-----------( 394      ( 04 May 2020 16:15 )             26.9       05-05-20 @ 06:13      139  |  102  |  7   ----------------------------<  99  4.3   |  23  |  0.66    05-04-20 @ 16:15      139  |  105  |  8   ----------------------------<  80  4.6   |  18<L>  |  0.69        Ca    8.3<L>      05 May 2020 06:13  Ca    9.3      04 May 2020 16:15  Mg     4.2<H>     05-04    TPro  7.1  /  Alb  3.5  /  TBili  < 0.2<L>  /  DBili  x   /  AST  12  /  ALT  10  /  AlkPhos  98  05-05-20 @ 06:13  TPro  7.4  /  Alb  3.5  /  TBili  < 0.2<L>  /  DBili  x   /  AST  20  /  ALT  12  /  AlkPhos  102  05-04-20 @ 16:15

## 2020-05-06 NOTE — PROGRESS NOTE ADULT - ATTENDING COMMENTS
pt seen and examined this am and reports marked improvement in headache. feels some pain when "tylenol" wears off but overall tolerable. seen by anesthesia and instructed to wean neurontin (PBTg5dx, QLUc2lj, qDx1wk). patient declined epidural blood patch since improving with current regimen. 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 Ok for DC home today

## 2020-05-06 NOTE — PROGRESS NOTE ADULT - ASSESSMENT
33 y/o  s/p pLTCS for NRFHT on 20, now POD#7, presenting with persistent HA and elevated BPs at home. Patient with intermittent severe range BPs. Patient now s/p magnesium for siPEC.  Continued headaches likely post-punctural, slightly improved with tylenol and neurontin.

## 2020-05-06 NOTE — PROGRESS NOTE ADULT - PROBLEM SELECTOR PLAN 2
-Anesthesia to re-evaluate this AM  -continue tylenol, neurontin, abdominal binder      Discharge home likely today    CAYDEN Vora MD PGY4

## 2020-05-13 ENCOUNTER — APPOINTMENT (OUTPATIENT)
Dept: ANTEPARTUM | Facility: CLINIC | Age: 35
End: 2020-05-13

## 2020-05-14 DIAGNOSIS — Z3A.00 WEEKS OF GESTATION OF PREGNANCY NOT SPECIFIED: ICD-10-CM

## 2020-05-14 DIAGNOSIS — O26.899 OTHER SPECIFIED PREGNANCY RELATED CONDITIONS, UNSPECIFIED TRIMESTER: ICD-10-CM

## 2021-06-09 NOTE — OB PROVIDER IHI INDUCTION/AUGMENTATION NOTE - NS_IHIMETHOD_OBGYN_ALL_OB
"Chief Complaint  BIPOLAR AFFECTIVE DISORDER (Caregiver wants to revisit patient medications with diagnosis), Anxiety, Depression, and Autistic Spectrum    Subjective    History of Present Illness      Patient presents to [facility] for   Caregiver states the patient is exhibiting some behavioral issues and wanting to run off if they are in a store.  She is having crying spells for no reason.  Her repetitiveness of conversations has gotten worse.  She states her agitation and aggressiveness is worse as well.    She is requesting to revisit patient's anxiety and other mental health conditions as she has been on her meds for many years with no changes.       Review of Systems   Constitutional: Negative.    HENT: Negative.    Eyes: Negative.    Respiratory: Negative.    Cardiovascular: Negative.    Gastrointestinal: Negative.    Endocrine: Negative.    Genitourinary: Negative.    Musculoskeletal: Negative.    Skin: Negative.    Allergic/Immunologic: Negative.    Neurological: Negative.    Hematological: Negative.    Psychiatric/Behavioral: Positive for agitation and behavioral problems. The patient is nervous/anxious.        I have reviewed and agree with the HPI and ROS information as above.  HALI Urena     Objective   Vital Signs:   /74   Pulse 115   Temp 98.1 °F (36.7 °C)   Ht 165.1 cm (65\")   SpO2 97%   BMI 29.35 kg/m²       Physical Exam  Constitutional:       Appearance: She is well-developed. She is obese.   HENT:      Head: Normocephalic and atraumatic.      Right Ear: Tympanic membrane, ear canal and external ear normal.      Left Ear: Tympanic membrane, ear canal and external ear normal.      Nose: Nose normal. No septal deviation, nasal tenderness or congestion.      Mouth/Throat:      Lips: Pink. No lesions.      Mouth: Mucous membranes are moist. No oral lesions.      Dentition: Normal dentition.      Pharynx: Oropharynx is clear. No pharyngeal swelling, " oropharyngeal exudate or posterior oropharyngeal erythema.   Eyes:      General: Lids are normal. Vision grossly intact. No scleral icterus.        Right eye: No discharge.         Left eye: No discharge.      Extraocular Movements: Extraocular movements intact.      Conjunctiva/sclera: Conjunctivae normal.      Right eye: Right conjunctiva is not injected.      Left eye: Left conjunctiva is not injected.      Pupils: Pupils are equal, round, and reactive to light.   Neck:      Thyroid: No thyroid mass.      Trachea: Trachea normal.   Cardiovascular:      Rate and Rhythm: Normal rate and regular rhythm.      Heart sounds: Normal heart sounds. No murmur heard.   No gallop.    Pulmonary:      Effort: Pulmonary effort is normal.      Breath sounds: Normal breath sounds and air entry. No wheezing, rhonchi or rales.   Abdominal:      General: There is no distension.      Palpations: Abdomen is soft. There is no mass.      Tenderness: There is no abdominal tenderness. There is no right CVA tenderness, left CVA tenderness, guarding or rebound.   Musculoskeletal:         General: No tenderness or deformity. Normal range of motion.      Cervical back: Full passive range of motion without pain, normal range of motion and neck supple.      Thoracic back: Normal.      Right lower leg: No edema.      Left lower leg: No edema.   Skin:     General: Skin is warm and dry.      Coloration: Skin is not jaundiced.      Findings: No rash.   Neurological:      Mental Status: She is alert. Mental status is at baseline.      Cranial Nerves: Cranial nerves are intact.      Sensory: Sensation is intact.      Motor: Motor function is intact.      Coordination: Coordination is intact.      Gait: Gait is intact.   Psychiatric:      Comments: Autistic, MR, rocking back and forth, only verbal communication is by repeating certain phrases. Group home caregiver present.           Result Review  Data Reviewed:                   Assessment and Plan       Problem List Items Addressed This Visit        Cardiac and Vasculature    Hypercholesterolemia    Relevant Medications    atorvastatin (LIPITOR) 10 MG tablet       Endocrine and Metabolic    Overweight    Relevant Medications    topiramate (TOPAMAX) 50 MG tablet       Gastrointestinal Abdominal     Irritable bowel syndrome    Relevant Medications    dicyclomine (BENTYL) 20 MG tablet       Mental Health    Bipolar affective disorder, current episode mixed (CMS/HCC) - Primary    Relevant Medications    divalproex (DEPAKOTE) 500 MG DR tablet    traZODone (DESYREL) 150 MG tablet    QUEtiapine (SEROquel) 200 MG tablet    citalopram (CeleXA) 20 MG tablet    chlorproMAZINE (THORAZINE) 25 MG tablet    ARIPiprazole (Abilify) 15 MG tablet    Other Relevant Orders    Ambulatory Referral to Psychiatry    Mixed anxiety and depressive disorder    Relevant Medications    traZODone (DESYREL) 150 MG tablet    QUEtiapine (SEROquel) 200 MG tablet    citalopram (CeleXA) 20 MG tablet    chlorproMAZINE (THORAZINE) 25 MG tablet    ARIPiprazole (Abilify) 15 MG tablet       Neuro    Autism spectrum disorder    Relevant Medications    traZODone (DESYREL) 150 MG tablet    QUEtiapine (SEROquel) 200 MG tablet    citalopram (CeleXA) 20 MG tablet    chlorproMAZINE (THORAZINE) 25 MG tablet    ARIPiprazole (Abilify) 15 MG tablet    Other Relevant Orders    Ambulatory Referral to Psychiatry       Sleep    Insomnia    Relevant Medications    traZODone (DESYREL) 150 MG tablet    melatonin 5 MG tablet tablet    cloNIDine (CATAPRES) 0.1 MG tablet      Other Visit Diagnoses     Intellectual disability        Relevant Medications    traZODone (DESYREL) 150 MG tablet    QUEtiapine (SEROquel) 200 MG tablet    citalopram (CeleXA) 20 MG tablet    chlorproMAZINE (THORAZINE) 25 MG tablet    ARIPiprazole (Abilify) 15 MG tablet    Acne, unspecified acne type          Patient presents with Group home caregiver today to re evaluate her conditions and  medications. Caregiver states she is more emotional and more angry than usual. She is running away from her which is a change. She is additionally repeating herself more than usual. She wishes to decrease the amount of medication she is on if possible as well. I have suggested an MRI brain but caregiver does not believe she would be able to tolerate this procedure even with an anxiolytic. Plan:   1. Increase Abilify to 15mg.   2. DC Remeron. Continue other insomnia medications.  3. Consult psych. For another opinion.            Follow Up   Return in about 1 month (around 7/9/2021).  Patient was given instructions and counseling regarding her condition or for health maintenance advice. Please see specific information pulled into the AVS if appropriate.        Pitocin

## 2023-01-19 NOTE — OB PROVIDER IHI INDUCTION/AUGMENTATION NOTE - NSCHECKLIST_OBGYN_ALL_OB_CAL
5
5
Clindamycin Pregnancy And Lactation Text: This medication can be used in pregnancy if certain situations. Clindamycin is also present in breast milk.

## 2023-02-13 NOTE — OB RN DELIVERY SUMMARY - NS_LABORCHARACTER_OBGYN_ALL_OB
Reinaldo Martin MD, [address] [facility] [place]            [facility] [address]      transcription may be present. Induction of labor-AROM/Augmentation of labor/Induction of labor-Medicinal/External electronic FM

## 2023-05-09 ENCOUNTER — EMERGENCY (EMERGENCY)
Facility: HOSPITAL | Age: 38
LOS: 1 days | Discharge: HOME | End: 2023-05-09
Attending: STUDENT IN AN ORGANIZED HEALTH CARE EDUCATION/TRAINING PROGRAM
Payer: COMMERCIAL

## 2023-05-09 VITALS
DIASTOLIC BLOOD PRESSURE: 79 MMHG | RESPIRATION RATE: 18 BRPM | SYSTOLIC BLOOD PRESSURE: 123 MMHG | TEMPERATURE: 99 F | OXYGEN SATURATION: 100 % | HEART RATE: 79 BPM | WEIGHT: 231.49 LBS | HEIGHT: 65 IN

## 2023-05-09 VITALS
RESPIRATION RATE: 18 BRPM | OXYGEN SATURATION: 97 % | DIASTOLIC BLOOD PRESSURE: 89 MMHG | SYSTOLIC BLOOD PRESSURE: 143 MMHG | TEMPERATURE: 98 F | HEART RATE: 71 BPM

## 2023-05-09 DIAGNOSIS — Z98.890 OTHER SPECIFIED POSTPROCEDURAL STATES: Chronic | ICD-10-CM

## 2023-05-09 DIAGNOSIS — Z98.84 BARIATRIC SURGERY STATUS: Chronic | ICD-10-CM

## 2023-05-09 LAB
ALBUMIN SERPL ELPH-MCNC: 3.5 G/DL — SIGNIFICANT CHANGE UP (ref 3.5–5)
ALP SERPL-CCNC: 78 U/L — SIGNIFICANT CHANGE UP (ref 40–120)
ALT FLD-CCNC: 15 U/L DA — SIGNIFICANT CHANGE UP (ref 10–60)
ANION GAP SERPL CALC-SCNC: 2 MMOL/L — LOW (ref 5–17)
APTT BLD: 33 SEC — SIGNIFICANT CHANGE UP (ref 27.5–35.5)
AST SERPL-CCNC: 9 U/L — LOW (ref 10–40)
BASOPHILS # BLD AUTO: 0.01 K/UL — SIGNIFICANT CHANGE UP (ref 0–0.2)
BASOPHILS NFR BLD AUTO: 0.2 % — SIGNIFICANT CHANGE UP (ref 0–2)
BILIRUB SERPL-MCNC: 0.2 MG/DL — SIGNIFICANT CHANGE UP (ref 0.2–1.2)
BLD GP AB SCN SERPL QL: SIGNIFICANT CHANGE UP
BUN SERPL-MCNC: 12 MG/DL — SIGNIFICANT CHANGE UP (ref 7–18)
CALCIUM SERPL-MCNC: 9.1 MG/DL — SIGNIFICANT CHANGE UP (ref 8.4–10.5)
CHLORIDE SERPL-SCNC: 112 MMOL/L — HIGH (ref 96–108)
CO2 SERPL-SCNC: 27 MMOL/L — SIGNIFICANT CHANGE UP (ref 22–31)
CREAT SERPL-MCNC: 1.04 MG/DL — SIGNIFICANT CHANGE UP (ref 0.5–1.3)
EGFR: 71 ML/MIN/1.73M2 — SIGNIFICANT CHANGE UP
EOSINOPHIL # BLD AUTO: 0.11 K/UL — SIGNIFICANT CHANGE UP (ref 0–0.5)
EOSINOPHIL NFR BLD AUTO: 1.9 % — SIGNIFICANT CHANGE UP (ref 0–6)
GLUCOSE SERPL-MCNC: 97 MG/DL — SIGNIFICANT CHANGE UP (ref 70–99)
HCG SERPL-ACNC: <1 MIU/ML — SIGNIFICANT CHANGE UP
HCT VFR BLD CALC: 32.1 % — LOW (ref 34.5–45)
HCT VFR BLD CALC: 32.4 % — LOW (ref 34.5–45)
HGB BLD-MCNC: 10.6 G/DL — LOW (ref 11.5–15.5)
HGB BLD-MCNC: 10.6 G/DL — LOW (ref 11.5–15.5)
IMM GRANULOCYTES NFR BLD AUTO: 0.2 % — SIGNIFICANT CHANGE UP (ref 0–0.9)
INR BLD: 1.13 RATIO — SIGNIFICANT CHANGE UP (ref 0.88–1.16)
LACTATE SERPL-SCNC: 1 MMOL/L — SIGNIFICANT CHANGE UP (ref 0.7–2)
LIDOCAIN IGE QN: 105 U/L — SIGNIFICANT CHANGE UP (ref 73–393)
LYMPHOCYTES # BLD AUTO: 2.55 K/UL — SIGNIFICANT CHANGE UP (ref 1–3.3)
LYMPHOCYTES # BLD AUTO: 44.3 % — HIGH (ref 13–44)
MCHC RBC-ENTMCNC: 26 PG — LOW (ref 27–34)
MCHC RBC-ENTMCNC: 26.1 PG — LOW (ref 27–34)
MCHC RBC-ENTMCNC: 32.7 GM/DL — SIGNIFICANT CHANGE UP (ref 32–36)
MCHC RBC-ENTMCNC: 33 GM/DL — SIGNIFICANT CHANGE UP (ref 32–36)
MCV RBC AUTO: 79.1 FL — LOW (ref 80–100)
MCV RBC AUTO: 79.4 FL — LOW (ref 80–100)
MONOCYTES # BLD AUTO: 0.5 K/UL — SIGNIFICANT CHANGE UP (ref 0–0.9)
MONOCYTES NFR BLD AUTO: 8.7 % — SIGNIFICANT CHANGE UP (ref 2–14)
NEUTROPHILS # BLD AUTO: 2.58 K/UL — SIGNIFICANT CHANGE UP (ref 1.8–7.4)
NEUTROPHILS NFR BLD AUTO: 44.7 % — SIGNIFICANT CHANGE UP (ref 43–77)
NRBC # BLD: 0 /100 WBCS — SIGNIFICANT CHANGE UP (ref 0–0)
NRBC # BLD: 0 /100 WBCS — SIGNIFICANT CHANGE UP (ref 0–0)
PLATELET # BLD AUTO: 321 K/UL — SIGNIFICANT CHANGE UP (ref 150–400)
PLATELET # BLD AUTO: 334 K/UL — SIGNIFICANT CHANGE UP (ref 150–400)
POTASSIUM SERPL-MCNC: 3.8 MMOL/L — SIGNIFICANT CHANGE UP (ref 3.5–5.3)
POTASSIUM SERPL-SCNC: 3.8 MMOL/L — SIGNIFICANT CHANGE UP (ref 3.5–5.3)
PROT SERPL-MCNC: 7.4 G/DL — SIGNIFICANT CHANGE UP (ref 6–8.3)
PROTHROM AB SERPL-ACNC: 13.5 SEC — HIGH (ref 10.5–13.4)
RBC # BLD: 4.06 M/UL — SIGNIFICANT CHANGE UP (ref 3.8–5.2)
RBC # BLD: 4.08 M/UL — SIGNIFICANT CHANGE UP (ref 3.8–5.2)
RBC # FLD: 15 % — HIGH (ref 10.3–14.5)
RBC # FLD: 15.1 % — HIGH (ref 10.3–14.5)
SODIUM SERPL-SCNC: 141 MMOL/L — SIGNIFICANT CHANGE UP (ref 135–145)
TROPONIN I, HIGH SENSITIVITY RESULT: 9.4 NG/L — SIGNIFICANT CHANGE UP
WBC # BLD: 5.76 K/UL — SIGNIFICANT CHANGE UP (ref 3.8–10.5)
WBC # BLD: 6.33 K/UL — SIGNIFICANT CHANGE UP (ref 3.8–10.5)
WBC # FLD AUTO: 5.76 K/UL — SIGNIFICANT CHANGE UP (ref 3.8–10.5)
WBC # FLD AUTO: 6.33 K/UL — SIGNIFICANT CHANGE UP (ref 3.8–10.5)

## 2023-05-09 PROCEDURE — 85610 PROTHROMBIN TIME: CPT

## 2023-05-09 PROCEDURE — 93005 ELECTROCARDIOGRAM TRACING: CPT

## 2023-05-09 PROCEDURE — 86900 BLOOD TYPING SEROLOGIC ABO: CPT

## 2023-05-09 PROCEDURE — 85025 COMPLETE CBC W/AUTO DIFF WBC: CPT

## 2023-05-09 PROCEDURE — 80053 COMPREHEN METABOLIC PANEL: CPT

## 2023-05-09 PROCEDURE — 74174 CTA ABD&PLVS W/CONTRAST: CPT | Mod: 26,MA

## 2023-05-09 PROCEDURE — 36415 COLL VENOUS BLD VENIPUNCTURE: CPT

## 2023-05-09 PROCEDURE — 74174 CTA ABD&PLVS W/CONTRAST: CPT | Mod: MA

## 2023-05-09 PROCEDURE — 71046 X-RAY EXAM CHEST 2 VIEWS: CPT

## 2023-05-09 PROCEDURE — 86850 RBC ANTIBODY SCREEN: CPT

## 2023-05-09 PROCEDURE — 86901 BLOOD TYPING SEROLOGIC RH(D): CPT

## 2023-05-09 PROCEDURE — 85730 THROMBOPLASTIN TIME PARTIAL: CPT

## 2023-05-09 PROCEDURE — 84702 CHORIONIC GONADOTROPIN TEST: CPT

## 2023-05-09 PROCEDURE — 99284 EMERGENCY DEPT VISIT MOD MDM: CPT

## 2023-05-09 PROCEDURE — 99285 EMERGENCY DEPT VISIT HI MDM: CPT | Mod: 25

## 2023-05-09 PROCEDURE — 83690 ASSAY OF LIPASE: CPT

## 2023-05-09 PROCEDURE — 84484 ASSAY OF TROPONIN QUANT: CPT

## 2023-05-09 PROCEDURE — 83605 ASSAY OF LACTIC ACID: CPT

## 2023-05-09 PROCEDURE — 71046 X-RAY EXAM CHEST 2 VIEWS: CPT | Mod: 26

## 2023-05-09 PROCEDURE — 85027 COMPLETE CBC AUTOMATED: CPT

## 2023-05-09 NOTE — ED ADULT NURSE NOTE - OBJECTIVE STATEMENT
Patient presents to the ED A&OX3 c/o blood in emesis.  Patient states she has a history of surgery gastric sleeve and hernia repair one month ago. Patients denies pain and discomfort.

## 2023-05-09 NOTE — ED PROVIDER NOTE - PROGRESS NOTE DETAILS
Ariel-DO: pt seen and re-evaluated at bedside.  Pt states their symptoms have improved.  Pt comfortable in NAD.  Labs/imaging non-actionable, no further episodes of vomiting or hematemesis. Hgb stable. Will DC with GI/surgery follow up, return precautions discussed, pt understood and agreeable with plan.

## 2023-05-09 NOTE — ED PROVIDER NOTE - PHYSICAL EXAMINATION
CONSTITUTIONAL: non-toxic, well appearing  SKIN: no rash, no petechiae.  EYES: pink conjunctiva, anicteric  NECK: Supple; no meningismus, no JVD  CARD: RRR, no murmurs, equal radial pulses bilaterally 2+, no crepitus  RESP: CTAB, no respiratory distress  ABD: Soft, non-tender, non-distended, no peritoneal signs, no CVA tenderness  EXT: Normal ROM x4. No edema.  NEURO: Alert, oriented. Neuro exam nonfocal  PSYCH: Cooperative, appropriate.

## 2023-05-09 NOTE — ED PROVIDER NOTE - NSFOLLOWUPCLINICS_GEN_ALL_ED_FT
[facility]  Gastroenterology  [address] [place] [address]  Phone: [phone]  Fax: [phone]

## 2023-05-09 NOTE — ED PROVIDER NOTE - CLINICAL SUMMARY MEDICAL DECISION MAKING FREE TEXT BOX
Jeane: 37-year-old female with past medical history hypertension, recent gastric sleeve revision 2 months ago at [facility] presents with hematemesis today.  Patient states she woke up with dried blood on her mouth, states she had nausea, 1 episode of vomiting with blood clots, states she had 2 additional episodes of blood-streaked emesis.   Patient admits mild left-sided chest pain after vomiting, but denies any fevers, shortness of breath, abdominal pain, diarrhea, bloody stools, black tarry stools, dysuria, numbness, weakness, or rash.  LMP yesterday.  Denies any anticoagulation use, states she takes aspirin daily.  Patient also states she had small amount of blood from her nose this morning.  Patient admits to nausea and 3 episodes of vomiting last week, states nonbloody nonbilious. Abdomen nontender, will obtain labs r/o anemia r/o thrombocytopenia r/o coagulopathy, imaging, PO trial with dispo pending workup.

## 2023-05-09 NOTE — ED PROVIDER NOTE - PATIENT PORTAL LINK FT
You can access the FollowMyHealth Patient Portal offered by [facility] by registering at the following website: [email or web address]. By joining [facility]’s FollowMyHealth portal, you will also be able to view your health information using other applications (apps) compatible with our system.

## 2023-07-28 ENCOUNTER — APPOINTMENT (OUTPATIENT)
Dept: GASTROENTEROLOGY | Facility: CLINIC | Age: 38
End: 2023-07-28

## 2023-11-03 NOTE — OB PROVIDER H&P - LIVING CHILDREN, OB PROFILE
Berta Griffin is a 4 year old female presenting for 4 year well visit.    Child accompanied by mother  Lives with mom, dad and brother(s)  Child-care arrangements:  program    Diet:      Milk: lactose free      Frequency: 2 cups/day      Solids: eating variety of finger foods, fruits, vegetables, meats and good variety of foods      Appetite: good      Any reactions to food: Yes, lactose      Water - private well City: [place]  Stools: 0-1 / day    Sleeping      Naps - zero hour(s)/day      Night - 11-12 hour(s)    Difficulty hearing: No    School/:      School: [employer]      Childcare: none      Performance: normal    Development:       Dresses without supervision - YES       Draws man - YES       Recognizes colors - YES       Hops on 1 foot - YES       Sentences - YES    Varicella Status: immune by documented two doses of vaccine  Vaccine reactions: None  Medications: Patient is not currently taking any medication:  Concerns: none    Medications reviewed and updated.  Denies known Latex allergy or symptoms of Latex sensitivity.    Health Maintenance Due   Topic Date Due   • COVID-19 Vaccine (1) Never done   • Influenza Vaccine (1) 09/01/2023   • Annual Physical (ages 3-18)  10/25/2023       Patient is due for topics as listed above but is not proceeding with Immunization(s) COVID-19 at this time.    0

## 2024-03-23 ENCOUNTER — EMERGENCY (EMERGENCY)
Facility: HOSPITAL | Age: 39
LOS: 1 days | Discharge: HOME | End: 2024-03-23
Attending: EMERGENCY MEDICINE | Admitting: EMERGENCY MEDICINE
Payer: COMMERCIAL

## 2024-03-23 VITALS
SYSTOLIC BLOOD PRESSURE: 185 MMHG | RESPIRATION RATE: 18 BRPM | TEMPERATURE: 98 F | OXYGEN SATURATION: 100 % | HEART RATE: 83 BPM | DIASTOLIC BLOOD PRESSURE: 129 MMHG

## 2024-03-23 VITALS
HEART RATE: 80 BPM | SYSTOLIC BLOOD PRESSURE: 152 MMHG | RESPIRATION RATE: 18 BRPM | TEMPERATURE: 99 F | OXYGEN SATURATION: 99 % | DIASTOLIC BLOOD PRESSURE: 95 MMHG

## 2024-03-23 DIAGNOSIS — Z98.84 BARIATRIC SURGERY STATUS: Chronic | ICD-10-CM

## 2024-03-23 DIAGNOSIS — Z98.890 OTHER SPECIFIED POSTPROCEDURAL STATES: Chronic | ICD-10-CM

## 2024-03-23 PROBLEM — I10 ESSENTIAL (PRIMARY) HYPERTENSION: Chronic | Status: ACTIVE | Noted: 2017-11-10

## 2024-03-23 PROBLEM — E11.9 TYPE 2 DIABETES MELLITUS WITHOUT COMPLICATIONS: Chronic | Status: ACTIVE | Noted: 2017-11-10

## 2024-03-23 LAB
ALBUMIN SERPL ELPH-MCNC: 3.9 G/DL — SIGNIFICANT CHANGE UP (ref 3.3–5)
ALP SERPL-CCNC: 74 U/L — SIGNIFICANT CHANGE UP (ref 40–120)
ALT FLD-CCNC: 14 U/L — SIGNIFICANT CHANGE UP (ref 4–33)
ANION GAP SERPL CALC-SCNC: 9 MMOL/L — SIGNIFICANT CHANGE UP (ref 7–14)
APPEARANCE UR: CLEAR — SIGNIFICANT CHANGE UP
AST SERPL-CCNC: 15 U/L — SIGNIFICANT CHANGE UP (ref 4–32)
BASOPHILS # BLD AUTO: 0.02 K/UL — SIGNIFICANT CHANGE UP (ref 0–0.2)
BASOPHILS NFR BLD AUTO: 0.3 % — SIGNIFICANT CHANGE UP (ref 0–2)
BILIRUB SERPL-MCNC: <0.2 MG/DL — SIGNIFICANT CHANGE UP (ref 0.2–1.2)
BILIRUB UR-MCNC: NEGATIVE — SIGNIFICANT CHANGE UP
BUN SERPL-MCNC: 10 MG/DL — SIGNIFICANT CHANGE UP (ref 7–23)
CALCIUM SERPL-MCNC: 9 MG/DL — SIGNIFICANT CHANGE UP (ref 8.4–10.5)
CHLORIDE SERPL-SCNC: 107 MMOL/L — SIGNIFICANT CHANGE UP (ref 98–107)
CO2 SERPL-SCNC: 24 MMOL/L — SIGNIFICANT CHANGE UP (ref 22–31)
COLOR SPEC: YELLOW — SIGNIFICANT CHANGE UP
CREAT SERPL-MCNC: 0.93 MG/DL — SIGNIFICANT CHANGE UP (ref 0.5–1.3)
DIFF PNL FLD: NEGATIVE — SIGNIFICANT CHANGE UP
EGFR: 80 ML/MIN/1.73M2 — SIGNIFICANT CHANGE UP
EOSINOPHIL # BLD AUTO: 0.08 K/UL — SIGNIFICANT CHANGE UP (ref 0–0.5)
EOSINOPHIL NFR BLD AUTO: 1.2 % — SIGNIFICANT CHANGE UP (ref 0–6)
GLUCOSE SERPL-MCNC: 96 MG/DL — SIGNIFICANT CHANGE UP (ref 70–99)
GLUCOSE UR QL: NEGATIVE MG/DL — SIGNIFICANT CHANGE UP
HCG SERPL-ACNC: SIGNIFICANT CHANGE UP MIU/ML
HCT VFR BLD CALC: 30 % — LOW (ref 34.5–45)
HGB BLD-MCNC: 10.1 G/DL — LOW (ref 11.5–15.5)
IANC: 3.45 K/UL — SIGNIFICANT CHANGE UP (ref 1.8–7.4)
IMM GRANULOCYTES NFR BLD AUTO: 0.3 % — SIGNIFICANT CHANGE UP (ref 0–0.9)
KETONES UR-MCNC: NEGATIVE MG/DL — SIGNIFICANT CHANGE UP
LEUKOCYTE ESTERASE UR-ACNC: NEGATIVE — SIGNIFICANT CHANGE UP
LYMPHOCYTES # BLD AUTO: 2.39 K/UL — SIGNIFICANT CHANGE UP (ref 1–3.3)
LYMPHOCYTES # BLD AUTO: 36.2 % — SIGNIFICANT CHANGE UP (ref 13–44)
MCHC RBC-ENTMCNC: 27.6 PG — SIGNIFICANT CHANGE UP (ref 27–34)
MCHC RBC-ENTMCNC: 33.7 GM/DL — SIGNIFICANT CHANGE UP (ref 32–36)
MCV RBC AUTO: 82 FL — SIGNIFICANT CHANGE UP (ref 80–100)
MONOCYTES # BLD AUTO: 0.64 K/UL — SIGNIFICANT CHANGE UP (ref 0–0.9)
MONOCYTES NFR BLD AUTO: 9.7 % — SIGNIFICANT CHANGE UP (ref 2–14)
NEUTROPHILS # BLD AUTO: 3.45 K/UL — SIGNIFICANT CHANGE UP (ref 1.8–7.4)
NEUTROPHILS NFR BLD AUTO: 52.3 % — SIGNIFICANT CHANGE UP (ref 43–77)
NITRITE UR-MCNC: NEGATIVE — SIGNIFICANT CHANGE UP
NRBC # BLD: 0 /100 WBCS — SIGNIFICANT CHANGE UP (ref 0–0)
NRBC # FLD: 0 K/UL — SIGNIFICANT CHANGE UP (ref 0–0)
PH UR: 6.5 — SIGNIFICANT CHANGE UP (ref 5–8)
PLATELET # BLD AUTO: 342 K/UL — SIGNIFICANT CHANGE UP (ref 150–400)
POTASSIUM SERPL-MCNC: 4.1 MMOL/L — SIGNIFICANT CHANGE UP (ref 3.5–5.3)
POTASSIUM SERPL-SCNC: 4.1 MMOL/L — SIGNIFICANT CHANGE UP (ref 3.5–5.3)
PROT SERPL-MCNC: 6.9 G/DL — SIGNIFICANT CHANGE UP (ref 6–8.3)
PROT UR-MCNC: NEGATIVE MG/DL — SIGNIFICANT CHANGE UP
RBC # BLD: 3.66 M/UL — LOW (ref 3.8–5.2)
RBC # FLD: 14.8 % — HIGH (ref 10.3–14.5)
SODIUM SERPL-SCNC: 140 MMOL/L — SIGNIFICANT CHANGE UP (ref 135–145)
SP GR SPEC: 1.01 — SIGNIFICANT CHANGE UP (ref 1–1.03)
UROBILINOGEN FLD QL: 0.2 MG/DL — SIGNIFICANT CHANGE UP (ref 0.2–1)
WBC # BLD: 6.6 K/UL — SIGNIFICANT CHANGE UP (ref 3.8–10.5)
WBC # FLD AUTO: 6.6 K/UL — SIGNIFICANT CHANGE UP (ref 3.8–10.5)

## 2024-03-23 PROCEDURE — 93010 ELECTROCARDIOGRAM REPORT: CPT

## 2024-03-23 PROCEDURE — 99284 EMERGENCY DEPT VISIT MOD MDM: CPT

## 2024-03-23 PROCEDURE — 76817 TRANSVAGINAL US OBSTETRIC: CPT | Mod: 26

## 2024-03-23 NOTE — ED PROVIDER NOTE - NSICDXPASTMEDICALHX_GEN_ALL_CORE_FT
PAST MEDICAL HISTORY:  Asthma only related to URI    COVID-19 virus infection     Diabetes mellitus, type 2     DM (diabetes mellitus)     HTN (hypertension)     Hypertension controlled by medication    PCOS (polycystic ovarian syndrome)

## 2024-03-23 NOTE — ED ADULT NURSE NOTE - OBJECTIVE STATEMENT
Break RN: Pt is a 37 y/o Female, A&Ox4, ambulatory with a PHx of HTN and is currently 6 weeks pregnant (). Pt presents to the ED with c/o brown vaginal discharge since last night. Pt states she had her first ultrasound with OBGYN yesterday where they found a subchorionic hematoma. Pt does not offer any complaints or pain at this time. Neuro/sensory intact. Respirations even and unlabored, chest rise equal b/l. VS as noted in flow sheets. Pt denies chest pain, SOB, fever, cough, chills, abdominal pain, N/V/D, h/a, dizziness, numbness/tingling or any urinary symptoms at this time. No acute distress noted. Safety maintained throughout. Break RN: Pt is a 39 y/o Female, A&Ox4, ambulatory with a PHx of HTN and is currently 6 weeks pregnant (). Pt presents to the ED with c/o brown vaginal discharge since last night. Pt states she had her first ultrasound with OBGYN yesterday where they found a subchorionic hematoma. Pt does not offer any complaints or pain at this time. Neuro/sensory intact. Respirations even and unlabored, chest rise equal b/l. VS as noted in flow sheets. Pt denies chest pain, SOB, fever, cough, chills, abdominal pain, N/V/D, h/a, dizziness, numbness/tingling or any urinary symptoms at this time. 20g IVL placed in LAC. Labs collected and sent. No acute distress noted. Safety maintained throughout.

## 2024-03-23 NOTE — ED PROVIDER NOTE - ATTENDING CONTRIBUTION TO CARE
Agree with above, patient with known IUP and subchorionic hematoma presents with spotting.  Patient overall well-appearing, abdomen soft nontender, hemodynamically stable.  Patient was sent to the ED for evaluation by her OB, plan for labs, transvaginal ultrasound to evaluate fetus.  Suspect bleeding may be related to subchorionic hematoma.  If no concerning findings on ED evaluation patient to follow-up with her OB/GYN.

## 2024-03-23 NOTE — ED PROVIDER NOTE - OBJECTIVE STATEMENT
38-year-old female G2, P1 6 weeks pregnant last menstrual period 24th presenting due to vaginal bleeding started yesterday.  Patient had a confirmed IUP on transvaginal ultrasound yesterday, and after that she started having dark brown vaginal discharge with some clotting.  Patient states there is no flow, but she notices the discharge when wiping.  Patient was advised by her OB/GYN to come to the emergency department.  Patient denies any abdominal pain, cramping, fevers, chills, pain with urination.

## 2024-03-23 NOTE — ED PROVIDER NOTE - PROGRESS NOTE DETAILS
I spoke with the patient and informed her that the results showed a live intrauterine pregnancy.  Patient stated that she is not experiencing any pain at the moment and I informed her to follow-up with her OB/GYN within the next week for further evaluation.  I gave the patient return precautions as outlined in the discharge instructions.  Patient is safe for discharge.

## 2024-03-23 NOTE — ED ADULT NURSE NOTE - NSFALLUNIVINTERV_ED_ALL_ED
Bed/Stretcher in lowest position, wheels locked, appropriate side rails in place/Call bell, personal items and telephone in reach/Instruct patient to call for assistance before getting out of bed/chair/stretcher/Non-slip footwear applied when patient is off stretcher/[place] to call system/Physically safe environment - no spills, clutter or unnecessary equipment/Purposeful proactive rounding/Room/bathroom lighting operational, light cord in reach

## 2024-03-23 NOTE — ED PROVIDER NOTE - NSFOLLOWUPINSTRUCTIONS_ED_ALL_ED_FT
You are seen today due to first trimester vaginal bleeding.  In the emergency department we performed labs and ultrasound which showed a single live intrauterine pregnancy with mild subchorionic hematoma.  You are safe for discharge.  Please follow-up with your OB/GYN within the next week for further management and evaluation.  Please return to the emergency department if experience worsening bleeding, painful uterine cramps, fevers, chills, body aches, profuse vaginal bleeding.

## 2024-03-23 NOTE — ED PROVIDER NOTE - PATIENT PORTAL LINK FT
You can access the FollowMyHealth Patient Portal offered by [facility] by registering at the following website: [email or web address]. By joining [employer]’s FollowMyHealth portal, you will also be able to view your health information using other applications (apps) compatible with our system.

## 2024-03-23 NOTE — ED PROVIDER NOTE - CLINICAL SUMMARY MEDICAL DECISION MAKING FREE TEXT BOX
38-year-old female  6 pregnant presented emergency department due to vaginal bleeding which started yesterday after having confirmed IUP via transvaginal ultrasound.  Patient denies any pain, fevers, chills.  On exam, patient had no tenderness to the abdomen with palpation.  Speculum exam showed brown discharge with a closed cervix.  No tenderness to the cervix or adnexal regions on bimanual exam.  Concern for miscarriage.  Will perform transvaginal ultrasound to evaluate for intrauterine pregnancy.  Will also order CBC, CMP, beta-hCG, urinalysis, urine culture to evaluate for any signs of UTI, anemia, and to track progress of pregnancy.

## 2024-03-23 NOTE — ED ADULT TRIAGE NOTE - CHIEF COMPLAINT QUOTE
6weeks reports vaginal bleeding since last night, told had a subchorionic hematoma by OBGYN and sent in. Denies chest pain, abdominal paim, SOB, dizziness, cramping. Phx HTN (currently in progress of changing medications)

## 2024-03-23 NOTE — ED PROVIDER NOTE - NSICDXPASTSURGICALHX_GEN_ALL_CORE_FT
PAST SURGICAL HISTORY:  H/O bariatric surgery 2018 gastric sleeve 2018    History of rotator cuff surgery right shoulder 2019    No significant past surgical history

## 2024-03-24 LAB
CULTURE RESULTS: SIGNIFICANT CHANGE UP
SPECIMEN SOURCE: SIGNIFICANT CHANGE UP

## 2024-10-02 NOTE — OB RN PATIENT PROFILE - EDUCATION PROVIDED ON BREASTFEEDING ASSESSMENT AND INSTRUCTION; INCLUDING POSITIONING, NEWBORN ATTACHMENT, AND COMFORT
Additional Notes: If issue doesn’t resolve advised patient to follow up in office to re evaluate. Render Risk Assessment In Note?: no Detail Level: Simple Statement Selected

## 2025-03-13 NOTE — PROVIDER CONTACT NOTE (OTHER) - NAME OF MD/NP/PA/DO NOTIFIED:
MD Blank 89-year-old male with past medical history of hypertension, hyperlipidemia, CHF, dementia sent in from nursing facility for weakness.  Patient was seen in ED earlier this morning after unwitnessed fall at facility with an unremarkable workup. Facility sent patient back to ED due to weakness and constricted pupils.  A&O x 1 at baseline. denies any other symptoms including fevers, chill, headache, recent illness/travel, cough, abdominal pain, chest pain, or SOB.

## 2025-05-01 DIAGNOSIS — Z3A.11 11 WEEKS GESTATION OF PREGNANCY: ICD-10-CM

## 2025-05-01 RX ORDER — NIFEDIPINE 60 MG/1
60 TABLET, FILM COATED, EXTENDED RELEASE ORAL
Refills: 0 | Status: ACTIVE | COMMUNITY

## 2025-05-01 RX ORDER — PANTOPRAZOLE 40 MG/1
40 TABLET, DELAYED RELEASE ORAL
Refills: 0 | Status: ACTIVE | COMMUNITY

## 2025-05-04 ENCOUNTER — EMERGENCY (EMERGENCY)
Facility: HOSPITAL | Age: 40
LOS: 1 days | End: 2025-05-04
Attending: EMERGENCY MEDICINE | Admitting: EMERGENCY MEDICINE
Payer: COMMERCIAL

## 2025-05-04 VITALS
TEMPERATURE: 98 F | WEIGHT: 251.99 LBS | RESPIRATION RATE: 16 BRPM | DIASTOLIC BLOOD PRESSURE: 84 MMHG | SYSTOLIC BLOOD PRESSURE: 124 MMHG | HEART RATE: 104 BPM | HEIGHT: 65 IN | OXYGEN SATURATION: 100 %

## 2025-05-04 VITALS
DIASTOLIC BLOOD PRESSURE: 83 MMHG | OXYGEN SATURATION: 96 % | RESPIRATION RATE: 16 BRPM | HEART RATE: 91 BPM | SYSTOLIC BLOOD PRESSURE: 142 MMHG | TEMPERATURE: 99 F

## 2025-05-04 DIAGNOSIS — Z98.84 BARIATRIC SURGERY STATUS: Chronic | ICD-10-CM

## 2025-05-04 DIAGNOSIS — Z98.890 OTHER SPECIFIED POSTPROCEDURAL STATES: Chronic | ICD-10-CM

## 2025-05-04 LAB
ADD ON TEST-SPECIMEN IN LAB: SIGNIFICANT CHANGE UP
ALBUMIN SERPL ELPH-MCNC: 4 G/DL — SIGNIFICANT CHANGE UP (ref 3.3–5)
ALP SERPL-CCNC: 63 U/L — SIGNIFICANT CHANGE UP (ref 40–120)
ALT FLD-CCNC: 18 U/L — SIGNIFICANT CHANGE UP (ref 4–33)
ANION GAP SERPL CALC-SCNC: 12 MMOL/L — SIGNIFICANT CHANGE UP (ref 7–14)
AST SERPL-CCNC: 19 U/L — SIGNIFICANT CHANGE UP (ref 4–32)
BASOPHILS # BLD AUTO: 0.01 K/UL — SIGNIFICANT CHANGE UP (ref 0–0.2)
BASOPHILS NFR BLD AUTO: 0.2 % — SIGNIFICANT CHANGE UP (ref 0–2)
BILIRUB SERPL-MCNC: <0.2 MG/DL — SIGNIFICANT CHANGE UP (ref 0.2–1.2)
BUN SERPL-MCNC: 7 MG/DL — SIGNIFICANT CHANGE UP (ref 7–23)
CALCIUM SERPL-MCNC: 8.7 MG/DL — SIGNIFICANT CHANGE UP (ref 8.4–10.5)
CHLORIDE SERPL-SCNC: 104 MMOL/L — SIGNIFICANT CHANGE UP (ref 98–107)
CO2 SERPL-SCNC: 21 MMOL/L — LOW (ref 22–31)
CREAT SERPL-MCNC: 0.7 MG/DL — SIGNIFICANT CHANGE UP (ref 0.5–1.3)
EGFR: 112 ML/MIN/1.73M2 — SIGNIFICANT CHANGE UP
EGFR: 112 ML/MIN/1.73M2 — SIGNIFICANT CHANGE UP
EOSINOPHIL # BLD AUTO: 0.01 K/UL — SIGNIFICANT CHANGE UP (ref 0–0.5)
EOSINOPHIL NFR BLD AUTO: 0.2 % — SIGNIFICANT CHANGE UP (ref 0–6)
GLUCOSE SERPL-MCNC: 108 MG/DL — HIGH (ref 70–99)
HCG SERPL-ACNC: SIGNIFICANT CHANGE UP MIU/ML
HCT VFR BLD CALC: 35.7 % — SIGNIFICANT CHANGE UP (ref 34.5–45)
HGB BLD-MCNC: 12.3 G/DL — SIGNIFICANT CHANGE UP (ref 11.5–15.5)
IANC: 5.24 K/UL — SIGNIFICANT CHANGE UP (ref 1.8–7.4)
IMM GRANULOCYTES NFR BLD AUTO: 0.5 % — SIGNIFICANT CHANGE UP (ref 0–0.9)
LYMPHOCYTES # BLD AUTO: 0.55 K/UL — LOW (ref 1–3.3)
LYMPHOCYTES # BLD AUTO: 8.6 % — LOW (ref 13–44)
MAGNESIUM SERPL-MCNC: 2.3 MG/DL — SIGNIFICANT CHANGE UP (ref 1.6–2.6)
MCHC RBC-ENTMCNC: 29 PG — SIGNIFICANT CHANGE UP (ref 27–34)
MCHC RBC-ENTMCNC: 34.5 G/DL — SIGNIFICANT CHANGE UP (ref 32–36)
MCV RBC AUTO: 84.2 FL — SIGNIFICANT CHANGE UP (ref 80–100)
MONOCYTES # BLD AUTO: 0.59 K/UL — SIGNIFICANT CHANGE UP (ref 0–0.9)
MONOCYTES NFR BLD AUTO: 9.2 % — SIGNIFICANT CHANGE UP (ref 2–14)
NEUTROPHILS # BLD AUTO: 5.24 K/UL — SIGNIFICANT CHANGE UP (ref 1.8–7.4)
NEUTROPHILS NFR BLD AUTO: 81.3 % — HIGH (ref 43–77)
NRBC # BLD AUTO: 0 K/UL — SIGNIFICANT CHANGE UP (ref 0–0)
NRBC # FLD: 0 K/UL — SIGNIFICANT CHANGE UP (ref 0–0)
NRBC BLD AUTO-RTO: 0 /100 WBCS — SIGNIFICANT CHANGE UP (ref 0–0)
PHOSPHATE SERPL-MCNC: 2.9 MG/DL — SIGNIFICANT CHANGE UP (ref 2.5–4.5)
PLATELET # BLD AUTO: 317 K/UL — SIGNIFICANT CHANGE UP (ref 150–400)
POTASSIUM SERPL-MCNC: 4.1 MMOL/L — SIGNIFICANT CHANGE UP (ref 3.5–5.3)
POTASSIUM SERPL-SCNC: 4.1 MMOL/L — SIGNIFICANT CHANGE UP (ref 3.5–5.3)
PROT SERPL-MCNC: 7.6 G/DL — SIGNIFICANT CHANGE UP (ref 6–8.3)
RBC # BLD: 4.24 M/UL — SIGNIFICANT CHANGE UP (ref 3.8–5.2)
RBC # FLD: 13.2 % — SIGNIFICANT CHANGE UP (ref 10.3–14.5)
SODIUM SERPL-SCNC: 137 MMOL/L — SIGNIFICANT CHANGE UP (ref 135–145)
WBC # BLD: 6.43 K/UL — SIGNIFICANT CHANGE UP (ref 3.8–10.5)
WBC # FLD AUTO: 6.43 K/UL — SIGNIFICANT CHANGE UP (ref 3.8–10.5)

## 2025-05-04 PROCEDURE — 99284 EMERGENCY DEPT VISIT MOD MDM: CPT

## 2025-05-04 RX ORDER — SODIUM CHLORIDE 9 G/1000ML
1000 INJECTION, SOLUTION INTRAVENOUS
Refills: 0 | Status: COMPLETED | OUTPATIENT
Start: 2025-05-04 | End: 2025-05-04

## 2025-05-04 RX ORDER — ONDANSETRON HCL/PF 4 MG/2 ML
4 VIAL (ML) INJECTION ONCE
Refills: 0 | Status: COMPLETED | OUTPATIENT
Start: 2025-05-04 | End: 2025-05-04

## 2025-05-04 RX ADMIN — Medication 4 MILLIGRAM(S): at 15:20

## 2025-05-04 RX ADMIN — Medication 1000 MILLILITER(S): at 15:20

## 2025-05-04 RX ADMIN — SODIUM CHLORIDE 250 MILLILITER(S): 9 INJECTION, SOLUTION INTRAVENOUS at 15:20

## 2025-05-04 NOTE — ED PROVIDER NOTE - CLINICAL SUMMARY MEDICAL DECISION MAKING FREE TEXT BOX
A 39-year-old female with a past medical history of hypertension and polycystic ovary syndrome presents with nausea, vomiting, and mucousy diarrhea since Friday. She notes a decreased appetite and an inability to tolerate food. She also reports being unable to take her labetalol and nifedipine since Friday. Her last menstrual period was on February 14th, and she is under the care of an OB/GYN at [facility]. An intrauterine pregnancy was confirmed six weeks ago, and fetal heart rate was positive at her OB appointment two weeks ago. The patient reports taking progesterone injections and oil due to her PCOS. She denies any sick contacts, recent travel, shortness of breath, chest pain, vaginal bleeding or spotting, abdominal pain, or urinary symptoms.    Hyperemesis Gravidarum:  The patient is pregnant and experiencing nausea, vomiting, and an inability to tolerate food. These are classic symptoms of hyperemesis gravidarum, a condition characterized by severe nausea and vomiting in pregnancy.    Gastroenteritis:  Although the patient denies sick contacts and recent travel, viral or bacterial gastroenteritis cannot be completely ruled out based on her symptoms of nausea, vomiting, and mucousy diarrhea.    Initiate supportive care with IV fluids to rehydration  and correct any electrolyte imbalances if any.  Antiemetic medications  PO challenge

## 2025-05-04 NOTE — ED ADULT TRIAGE NOTE - CHIEF COMPLAINT QUOTE
pt 11 weeks pregnant has been vomiting for couple days with diarrhea all day  pt has not been able to take her meds for bp

## 2025-05-04 NOTE — ED PROVIDER NOTE - OBJECTIVE STATEMENT
A 39-year-old female with a past medical history of hypertension and polycystic ovary syndrome presents with nausea, vomiting, and mucousy diarrhea since Friday. She notes a decreased appetite and an inability to tolerate food. She also reports being unable to take her labetalol and nifedipine since Friday. Her last menstrual period was on February 14th, and she is under the care of an OB/GYN at [facility]. An intrauterine pregnancy was confirmed six weeks ago, and fetal heart rate was positive at her OB appointment two weeks ago. The patient reports taking progesterone injections and oil due to her PCOS. She denies any sick contacts, recent travel, shortness of breath, chest pain, vaginal bleeding or spotting, abdominal pain, or urinary symptoms.

## 2025-05-04 NOTE — ED ADULT NURSE NOTE - OBJECTIVE STATEMENT
pt 11 weeks pregnant has been vomiting for couple days with diarrhea all day  pt has not been able to take her meds for bp  +preg vomiting. Patient vomited all day every time she got up to go to bathroom. Patient has right AC 20G IVG placed labs sent, Zofran 4mg IVP given for Nausea, NS bolus X 1 liter infusing also D5 @ 250/hr infusing. pt 11 weeks pregnant has been vomiting for couple days with diarrhea all day  pt has not been able to take her meds for bp  +preg vomiting. Patient vomited all day every time she got up to go to bathroom. Patient has right AC 20G IVG placed labs sent, Zofran 4mg IVP given for Nausea, NS bolus X 1 liter infusing also D5 NS @ 250/hr infusing. pt 11 weeks pregnant has been vomiting for couple days with diarrhea all day  pt has not been able to take her meds for bp  +preg vomiting. Patient vomited all day every time she got up to go to bathroom. Patient has right AC 20G IVG placed labs sent, Zofran 4mg IVP given for Nausea, NS bolus X 1 liter infusing also D5 NS @ 250/hr infusing. Patient had Stool GI PCR and stool culture sent. pt 11 weeks pregnant has been vomiting for couple days with diarrhea all day  pt has not been able to take her meds for bp  +preg vomiting. Patient vomited all day every time she got up to go to bathroom. Patient has right AC 20G IVG placed labs sent, Zofran 4mg IVP given for Nausea, NS bolus X 1 liter infusing also D5 NS @ 250/hr infusing. Patient had Stool GI PCR and stool culture sent.. Patient had po challenge saltines and ginger ale given.

## 2025-05-04 NOTE — ED PROVIDER NOTE - ATTENDING APP SHARED VISIT CONTRIBUTION OF CARE
Brief HPI:  39 year old F pmh htn and pcos, 11 weeks gestation, patient reports confirmed IUP via ultrasound for this pregnancy presents for vomiting, diarrhea for three days.  Denies recent travel or sick contacts.  Denies recent abx use.  Denies fever, chills, abdominal pain, bloody stool, flank pain, vaginal bleeding.      Vitals:   Reviewed    Exam:    GEN:  Non-toxic appearing, non-distressed, speaking full sentences, non-diaphoretic, AAOx3  HEENT:  NCAT, neck supple, EOMI, PERRLA, sclera anicteric, no conjunctival pallor or injection, no stridor, normal voice, no tonsillar exudate, uvula midline  CV:  regular rhythm and rate, s1/s2 audible, no murmurs, rubs or gallops, peripheral pulses 2+ and symmetric  PULM:  non-labored respirations, lungs clear to auscultation bilaterally, no wheezes, crackles or rales  ABD:  non distended, non-tender, no rebound, no guarding, negative Xie's sign, bowel sounds normal, no cvat  MSK:  no gross deformity, non-tender extremities and joints, range of motion grossly normal appearing, no extremity edema, extremities warm and well perfused   NEURO:  AAOx3, CN II-XII intact, motor 5/5 in upper and lower extremities bilaterally, sensation grossly intact in extremities and trunk,  no gait deficit  SKIN:  warm, dry, no rash or vesicles     A/P:  39 year old F pmh htn and pcos, 11 weeks gestation, patient reports confirmed IUP via ultrasound for this pregnancy presents for vomiting, diarrhea for three days.  VSS AF.  Exam non-toxic appearing, abdomen non-tender.  Low c/f acute surgical pathology.  Possible viral syndrome.   Will obtain labs, stool studies, supportive care.  Disposition pending.

## 2025-05-04 NOTE — ED PROVIDER NOTE - PATIENT PORTAL LINK FT
You can access the FollowMyHealth Patient Portal offered by [facility] by registering at the following website: [email or web address]. By joining [employer]’s FollowMyHealth portal, you will also be able to view your health information using other applications (apps) compatible with our system.

## 2025-05-04 NOTE — ED ADULT NURSE NOTE - NSFALLRISKFACTORS_ED_ALL_ED
Pt now rescheduled  ----- Message from Soheila Ch MA sent at 2/26/2020  9:51 AM CST -----  Contact: Aashish [phone]  Pt is returning your call. Please call.     No indicators present

## 2025-05-04 NOTE — ED PROVIDER NOTE - NSFOLLOWUPINSTRUCTIONS_ED_ALL_ED_FT
Hyperemesis Gravidarum  Hyperemesis gravidarum is a severe form of nausea and vomiting that happens during pregnancy. Hyperemesis is worse than morning sickness. It may cause you to have nausea or vomiting all day for many days. It may keep you from eating and drinking enough food and liquids, which can lead to dehydration, malnutrition, and weight loss. Hyperemesis usually occurs during the first half (the first 20 weeks) of pregnancy. It often goes away once a woman is in her second half of pregnancy. However, sometimes hyperemesis continues through an entire pregnancy.    What are the causes?  The cause of this condition is not known. It may be related to changes in chemicals (hormones) in the body during pregnancy, such as the high level of pregnancy hormone (human chorionic gonadotropin) or the increase in the female sex hormone (estrogen).    What are the signs or symptoms?  Symptoms of this condition include:  Nausea that does not go away.  Vomiting that does not allow you to keep any food down.  Weight loss.  Body fluid loss (dehydration).  Having no desire to eat, or not liking food that you have previously enjoyed.  How is this diagnosed?  This condition may be diagnosed based on:  A physical exam.  Your medical history.  Your symptoms.  Blood tests.  Urine tests.  How is this treated?  This condition is managed by controlling symptoms. This may include:  Following an eating plan. This can help lessen nausea and vomiting.  Taking prescription medicines.  An eating plan and medicines are often used together to help control symptoms. If medicines do not help relieve nausea and vomiting, you may need to receive fluids through an IV at the hospital.    Follow these instructions at home:  Eating and drinking     Avoid the following:  Drinking fluids with meals. Try not to drink anything during the 30 minutes before and after your meals.  Drinking more than 1 cup of fluid at a time.  Eating foods that trigger your symptoms. These may include spicy foods, coffee, high-fat foods, very sweet foods, and acidic foods.  Skipping meals. Nausea can be more intense on an empty stomach. If you cannot tolerate food, do not force it. Try sucking on ice chips or other frozen items and make up for missed calories later.  Lying down within 2 hours after eating.  Being exposed to environmental triggers. These may include food smells, smoky rooms, closed spaces, rooms with strong smells, warm or humid places, overly loud and noisy rooms, and rooms with motion or flickering lights. Try eating meals in a well-ventilated area that is free of strong smells.  Quick and sudden changes in your movement.  Taking iron pills and multivitamins that contain iron. If you take prescription iron pills, do not stop taking them unless your health care provider approves.  Preparing food. The smell of food can spoil your appetite or trigger nausea.  To help relieve your symptoms:  Listen to your body. Everyone is different and has different preferences. Find what works best for you.  Eat and drink slowly.  Eat 5–6 small meals daily instead of 3 large meals. Eating small meals and snacks can help you avoid an empty stomach.  In the morning, before getting out of bed, eat a couple of crackers to avoid moving around on an empty stomach.  Try eating starchy foods as these are usually tolerated well. Examples include cereal, toast, bread, potatoes, pasta, rice, and pretzels.  Include at least 1 serving of protein with your meals and snacks. Protein options include lean meats, poultry, seafood, beans, nuts, nut butters, eggs, cheese, and yogurt.  Try eating a protein-rich snack before bed. Examples of a protein-paual snack include cheese and crackers or a peanut butter sandwich made with 1 slice of whole-wheat bread and 1 tsp (5 g) of peanut butter.  Eat or suck on things that have ginger in them. It may help relieve nausea. Add ¼ tsp ground ginger to hot tea or choose ginger tea.  Try drinking 100% fruit juice or an electrolyte drink. An electrolyte drink contains sodium, potassium, and chloride.  Drink fluids that are cold, clear, and carbonated or sour. Examples include lemonade, ginger ale, lemon–lime soda, ice water, and sparkling water.  Brush your teeth or use a mouth rinse after meals.  Talk with your health care provider about starting a supplement of vitamin B6.  General instructions     Take over-the-counter and prescription medicines only as told by your health care provider.  Follow instructions from your health care provider about eating or drinking restrictions.  Continue to take your prenatal vitamins as told by your health care provider. If you are having trouble taking your prenatal vitamins, talk with your health care provider about different options.  Keep all follow-up and pre-birth (prenatal) visits as told by your health care provider. This is important.  Contact a health care provider if:  You have pain in your abdomen.  You have a severe headache.  You have vision problems.  You are losing weight.  You feel weak or dizzy.  Get help right away if:  You cannot drink fluids without vomiting.  You vomit blood.  You have constant nausea and vomiting.  You are very weak.  You faint.  You have a fever and your symptoms suddenly get worse.  Summary  Hyperemesis gravidarum is a severe form of nausea and vomiting that happens during pregnancy.  Making some changes to your eating habits may help relieve nausea and vomiting.  This condition may be managed with medicine.  If medicines do not help relieve nausea and vomiting, you may need to receive fluids through an IV at the hospital.  This information is not intended to replace advice given to you by your health care provider. Make sure you discuss any questions you have with your health care provider.    Please follow up with your obgyn.

## 2025-05-05 LAB
GI PCR PANEL: DETECTED
RV RNA STL NAA+PROBE: DETECTED

## 2025-05-05 NOTE — ED POST DISCHARGE NOTE - REASON FOR FOLLOW-UP
Rotavirus detected. Spoke with pt and discussed with pt self limiting and supportive care. Discussed with patient to follow up with GI MD. Discussed with patient need to return to ED if symptoms don't continue to improve or recur or develops any new or worsening symptoms that are of concern. Culture Follow-up/Other

## 2025-05-11 ENCOUNTER — EMERGENCY (EMERGENCY)
Facility: HOSPITAL | Age: 40
LOS: 1 days | End: 2025-05-11
Attending: STUDENT IN AN ORGANIZED HEALTH CARE EDUCATION/TRAINING PROGRAM | Admitting: STUDENT IN AN ORGANIZED HEALTH CARE EDUCATION/TRAINING PROGRAM
Payer: COMMERCIAL

## 2025-05-11 VITALS
DIASTOLIC BLOOD PRESSURE: 79 MMHG | WEIGHT: 251.99 LBS | TEMPERATURE: 98 F | SYSTOLIC BLOOD PRESSURE: 158 MMHG | HEART RATE: 78 BPM | OXYGEN SATURATION: 98 % | HEIGHT: 65 IN | RESPIRATION RATE: 18 BRPM

## 2025-05-11 DIAGNOSIS — Z98.890 OTHER SPECIFIED POSTPROCEDURAL STATES: Chronic | ICD-10-CM

## 2025-05-11 DIAGNOSIS — Z98.84 BARIATRIC SURGERY STATUS: Chronic | ICD-10-CM

## 2025-05-11 LAB
ADD ON TEST-SPECIMEN IN LAB: SIGNIFICANT CHANGE UP
ALBUMIN SERPL ELPH-MCNC: 3.7 G/DL — SIGNIFICANT CHANGE UP (ref 3.3–5)
ALP SERPL-CCNC: 67 U/L — SIGNIFICANT CHANGE UP (ref 40–120)
ALT FLD-CCNC: 19 U/L — SIGNIFICANT CHANGE UP (ref 4–33)
ANION GAP SERPL CALC-SCNC: 13 MMOL/L — SIGNIFICANT CHANGE UP (ref 7–14)
APPEARANCE UR: CLEAR — SIGNIFICANT CHANGE UP
AST SERPL-CCNC: 16 U/L — SIGNIFICANT CHANGE UP (ref 4–32)
BASOPHILS # BLD AUTO: 0.01 K/UL — SIGNIFICANT CHANGE UP (ref 0–0.2)
BASOPHILS NFR BLD AUTO: 0.1 % — SIGNIFICANT CHANGE UP (ref 0–2)
BILIRUB SERPL-MCNC: <0.2 MG/DL — SIGNIFICANT CHANGE UP (ref 0.2–1.2)
BILIRUB UR-MCNC: NEGATIVE — SIGNIFICANT CHANGE UP
BLD GP AB SCN SERPL QL: NEGATIVE — SIGNIFICANT CHANGE UP
BUN SERPL-MCNC: 9 MG/DL — SIGNIFICANT CHANGE UP (ref 7–23)
CALCIUM SERPL-MCNC: 9.5 MG/DL — SIGNIFICANT CHANGE UP (ref 8.4–10.5)
CHLORIDE SERPL-SCNC: 105 MMOL/L — SIGNIFICANT CHANGE UP (ref 98–107)
CO2 SERPL-SCNC: 19 MMOL/L — LOW (ref 22–31)
COLOR SPEC: YELLOW — SIGNIFICANT CHANGE UP
CREAT SERPL-MCNC: 0.89 MG/DL — SIGNIFICANT CHANGE UP (ref 0.5–1.3)
DIFF PNL FLD: NEGATIVE — SIGNIFICANT CHANGE UP
EGFR: 84 ML/MIN/1.73M2 — SIGNIFICANT CHANGE UP
EGFR: 84 ML/MIN/1.73M2 — SIGNIFICANT CHANGE UP
EOSINOPHIL # BLD AUTO: 0.07 K/UL — SIGNIFICANT CHANGE UP (ref 0–0.5)
EOSINOPHIL NFR BLD AUTO: 0.8 % — SIGNIFICANT CHANGE UP (ref 0–6)
GLUCOSE SERPL-MCNC: 119 MG/DL — HIGH (ref 70–99)
GLUCOSE UR QL: NEGATIVE MG/DL — SIGNIFICANT CHANGE UP
HCT VFR BLD CALC: 31.3 % — LOW (ref 34.5–45)
HGB BLD-MCNC: 10.9 G/DL — LOW (ref 11.5–15.5)
IANC: 5.07 K/UL — SIGNIFICANT CHANGE UP (ref 1.8–7.4)
IMM GRANULOCYTES NFR BLD AUTO: 0.2 % — SIGNIFICANT CHANGE UP (ref 0–0.9)
KETONES UR-MCNC: ABNORMAL MG/DL
LEUKOCYTE ESTERASE UR-ACNC: NEGATIVE — SIGNIFICANT CHANGE UP
LYMPHOCYTES # BLD AUTO: 2.71 K/UL — SIGNIFICANT CHANGE UP (ref 1–3.3)
LYMPHOCYTES # BLD AUTO: 32.5 % — SIGNIFICANT CHANGE UP (ref 13–44)
MCHC RBC-ENTMCNC: 29.7 PG — SIGNIFICANT CHANGE UP (ref 27–34)
MCHC RBC-ENTMCNC: 34.8 G/DL — SIGNIFICANT CHANGE UP (ref 32–36)
MCV RBC AUTO: 85.3 FL — SIGNIFICANT CHANGE UP (ref 80–100)
MONOCYTES # BLD AUTO: 0.46 K/UL — SIGNIFICANT CHANGE UP (ref 0–0.9)
MONOCYTES NFR BLD AUTO: 5.5 % — SIGNIFICANT CHANGE UP (ref 2–14)
NEUTROPHILS # BLD AUTO: 5.07 K/UL — SIGNIFICANT CHANGE UP (ref 1.8–7.4)
NEUTROPHILS NFR BLD AUTO: 60.9 % — SIGNIFICANT CHANGE UP (ref 43–77)
NITRITE UR-MCNC: NEGATIVE — SIGNIFICANT CHANGE UP
NRBC # BLD AUTO: 0 K/UL — SIGNIFICANT CHANGE UP (ref 0–0)
NRBC # FLD: 0 K/UL — SIGNIFICANT CHANGE UP (ref 0–0)
NRBC BLD AUTO-RTO: 0 /100 WBCS — SIGNIFICANT CHANGE UP (ref 0–0)
PH UR: 6 — SIGNIFICANT CHANGE UP (ref 5–8)
PLATELET # BLD AUTO: 326 K/UL — SIGNIFICANT CHANGE UP (ref 150–400)
POTASSIUM SERPL-MCNC: 4.2 MMOL/L — SIGNIFICANT CHANGE UP (ref 3.5–5.3)
POTASSIUM SERPL-SCNC: 4.2 MMOL/L — SIGNIFICANT CHANGE UP (ref 3.5–5.3)
PROT SERPL-MCNC: 6.9 G/DL — SIGNIFICANT CHANGE UP (ref 6–8.3)
PROT UR-MCNC: SIGNIFICANT CHANGE UP MG/DL
RBC # BLD: 3.67 M/UL — LOW (ref 3.8–5.2)
RBC # FLD: 13 % — SIGNIFICANT CHANGE UP (ref 10.3–14.5)
RH IG SCN BLD-IMP: POSITIVE — SIGNIFICANT CHANGE UP
SODIUM SERPL-SCNC: 137 MMOL/L — SIGNIFICANT CHANGE UP (ref 135–145)
SP GR SPEC: 1.02 — SIGNIFICANT CHANGE UP (ref 1–1.03)
UROBILINOGEN FLD QL: 1 MG/DL — SIGNIFICANT CHANGE UP (ref 0.2–1)
WBC # BLD: 8.34 K/UL — SIGNIFICANT CHANGE UP (ref 3.8–10.5)
WBC # FLD AUTO: 8.34 K/UL — SIGNIFICANT CHANGE UP (ref 3.8–10.5)

## 2025-05-11 PROCEDURE — 99285 EMERGENCY DEPT VISIT HI MDM: CPT

## 2025-05-11 PROCEDURE — 76817 TRANSVAGINAL US OBSTETRIC: CPT | Mod: 26

## 2025-05-11 NOTE — ED PROVIDER NOTE - PHYSICAL EXAMINATION
Vital signs reviewed.  CONSTITUTIONAL: NAD   HEAD: Normocephalic; atraumatic  EYES: EOMI, PERRL   MOUTH/THROAT:  MMM  NECK: Trachea midline, no JVD  CV: Normal S1, S2; no audible murmurs  RESP: normal work of breathing; CTAB   ABD: soft, non-distended; non-tender to palpation   : Vaginal exam chaperoned by CARLEY Hines   -Inspection: No external lesions on labia.  -Speculum exam: Dark blood in vaginal vault, no clots   -Bimanual exam: No adnexal tenderness. No CMT   MSK/EXT: no LE edema, no limited ROM  SKIN: No rashes on exposed skin surfaces  NEURO: Moves all extremities spontaneously with no focal deficits, speech is appropriate  PSYCH: calm interactive Vital signs reviewed.  CONSTITUTIONAL: NAD   HEAD: Normocephalic; atraumatic  EYES: EOMI, PERRL   MOUTH/THROAT:  MMM  NECK: Trachea midline, no JVD  CV: Normal S1, S2; no audible murmurs  RESP: normal work of breathing; CTAB   ABD: soft, non-distended; non-tender to palpation   : Vaginal exam chaperoned by CARLEY Hines   -Inspection: No external lesions on labia.  -Speculum exam: Dark blood in vaginal vault, no clots; clit like closed OS    -Bimanual exam: No adnexal tenderness. No CMT   MSK/EXT: no LE edema, no limited ROM  SKIN: No rashes on exposed skin surfaces  NEURO: Moves all extremities spontaneously with no focal deficits, speech is appropriate  PSYCH: calm interactive

## 2025-05-11 NOTE — ED PROVIDER NOTE - PATIENT PORTAL LINK FT
You can access the FollowMyHealth Patient Portal offered by [facility] by registering at the following website: [email or web address]. By joining [employer]’s FollowMyHealth portal, you will also be able to view your health information using other applications (apps) compatible with our system.

## 2025-05-11 NOTE — ED ADULT NURSE NOTE - OBJECTIVE STATEMENT
40 yo F AAOx4 received to rm 13, approx 12 weeks pregnant, c/o intermittent vag bleeding x 1 week, today having a "gush of blood which prompted ED visit, denies abd pain/cramping, #20 placed to LAC, pending US

## 2025-05-11 NOTE — ED PROVIDER NOTE - NSFOLLOWUPCLINICS_GEN_ALL_ED_FT
[facility]  OB/GYN & Surg  [phone] [place] [address]  Phone: [phone]  Fax:   Follow Up Time: 4-6 Days

## 2025-05-11 NOTE — ED PROVIDER NOTE - CLINICAL SUMMARY MEDICAL DECISION MAKING FREE TEXT BOX
39-year-old female G4, P1 currently 12-week gestation, past medical history of hypertension, prediabetes on metformin, PCOS, GERD presented to the emergency room for vaginal bleeding.  Report to first trimester miscarriages at week 5 and 7 within the last year, told by OB that was from progesterone deficiency.  Therefore current gestation has been on progesterone suppository and ointment for the first 10 weeks.  Last week had GI symptoms from rotavirus and did not take progesterone for a few days.  Since Monday noticed vaginal spotting.  Went to OB physician at [facility] who confirmed  life fetus via ultrasound.  Patient was restarted on progesterone.  Vaginal spotting has been persistent, today noticed more bleeding saturating liner prompting ED visit.  Denies nausea, vomiting, abdominal pain, chest pain, shortness of breath, urinary frequency/urgency, dysuria, fever, chills.  Vitals on presentation nonactionable.  Exam as above.  Presentation consider  spontaneous  versus threatened  versus subchorionic hematoma.  Will obtain labs including type and screen, UA. Transvaginal ultrasound.  Dispo pending workup.

## 2025-05-11 NOTE — ED PROVIDER NOTE - NSFOLLOWUPINSTRUCTIONS_ED_ALL_ED_FT
You were seen today with vaginal bleeding in the first trimester of pregnancy.    Imaging today shows (one live intrauterine pregnancy confirmed with transvaginal ultrasound at 13 week gestation).  It also demonstrates (one subchorionic hematoma of small size  3 x 1 x 1.4 cm).  You should follow up with your Obstetrician about this finding, but there is no acute action needed.    Please follow up with your ObGyn in 3-5 days or as able.  Call their office for an appointment.  If you do not have an OBGyn, you may contact the [facility] OB office for an appointment.    Please return to the ED for any worsening or uncontrolled vaginal bleeding, fever, abdominal pain, nausea, or vomiting.  Thank you for choosing us for your care.

## 2025-05-11 NOTE — ED PROVIDER NOTE - ATTENDING CONTRIBUTION TO CARE
39-year-old female  approximately 12 weeks gestation, diabetes, PCOS, GERD, chronic hypertension presents for vaginal bleeding.  Started having small amount last week, saw GYN several days ago who suggested etiology was secondary to progesterone withdrawal (patient had been on progesterone suppositories and oil due to low levels with prior miscarriages).  Patient noted increase relative increase in vaginal spotting today so came to the ED for evaluation.  Patient denies abdominal pain, nausea, vomiting, dysuria, hematuria.  Patient denies fevers, chills, chest pain, short of breath, palpitations.  Exam as above (please note pelvic exam performed by resident).    Differential diagnosis includes, but not limited to, subchorionic hematoma, spontaneous , progesterone withdrawal.  Plan: Labs, ultrasound, reassess.  Patient signed out in my shift.

## 2025-05-12 ENCOUNTER — NON-APPOINTMENT (OUTPATIENT)
Age: 40
End: 2025-05-12

## 2025-05-12 ENCOUNTER — APPOINTMENT (OUTPATIENT)
Dept: CARDIOLOGY | Facility: CLINIC | Age: 40
End: 2025-05-12

## 2025-05-12 VITALS
BODY MASS INDEX: 41.99 KG/M2 | WEIGHT: 252 LBS | DIASTOLIC BLOOD PRESSURE: 76 MMHG | HEART RATE: 79 BPM | TEMPERATURE: 98.6 F | OXYGEN SATURATION: 99 % | HEIGHT: 65 IN | RESPIRATION RATE: 17 BRPM | SYSTOLIC BLOOD PRESSURE: 118 MMHG

## 2025-05-12 DIAGNOSIS — Z3A.11 11 WEEKS GESTATION OF PREGNANCY: ICD-10-CM

## 2025-05-12 DIAGNOSIS — D57.3 SICKLE-CELL TRAIT: ICD-10-CM

## 2025-05-12 DIAGNOSIS — O10.919 UNSPECIFIED PRE-EXISTING HYPERTENSION COMPLICATING PREGNANCY, UNSPECIFIED TRIMESTER: ICD-10-CM

## 2025-05-12 DIAGNOSIS — O09.299 SUPERVISION OF PREGNANCY WITH OTHER POOR REPRODUCTIVE OR OBSTETRIC HISTORY, UNSPECIFIED TRIMESTER: ICD-10-CM

## 2025-05-12 DIAGNOSIS — Z91.89 OTHER SPECIFIED PERSONAL RISK FACTORS, NOT ELSEWHERE CLASSIFIED: ICD-10-CM

## 2025-05-12 DIAGNOSIS — O24.819: ICD-10-CM

## 2025-05-12 PROCEDURE — 99203 OFFICE O/P NEW LOW 30 MIN: CPT

## 2025-05-12 PROCEDURE — 93000 ELECTROCARDIOGRAM COMPLETE: CPT

## 2025-05-12 PROCEDURE — G2211 COMPLEX E/M VISIT ADD ON: CPT | Mod: NC

## 2025-05-12 NOTE — ED ADULT NURSE NOTE - CAS EDN DISCHARGE INTERVENTIONS
Quality 226: Preventive Care And Screening: Tobacco Use: Screening And Cessation Intervention: Patient screened for tobacco use and is an ex/non-smoker
Detail Level: Detailed
Quality 130: Documentation Of Current Medications In The Medical Record: Current Medications Documented
IV discontinued, cath removed intact

## 2025-05-13 ENCOUNTER — ASOB RESULT (OUTPATIENT)
Age: 40
End: 2025-05-13

## 2025-05-13 ENCOUNTER — APPOINTMENT (OUTPATIENT)
Dept: ANTEPARTUM | Facility: CLINIC | Age: 40
End: 2025-05-13
Payer: COMMERCIAL

## 2025-05-13 PROCEDURE — 76813 OB US NUCHAL MEAS 1 GEST: CPT

## 2025-05-13 PROCEDURE — 76801 OB US < 14 WKS SINGLE FETUS: CPT

## 2025-07-10 ENCOUNTER — ASOB RESULT (OUTPATIENT)
Age: 40
End: 2025-07-10

## 2025-07-10 ENCOUNTER — APPOINTMENT (OUTPATIENT)
Dept: ANTEPARTUM | Facility: CLINIC | Age: 40
End: 2025-07-10
Payer: COMMERCIAL

## 2025-07-10 PROCEDURE — 76811 OB US DETAILED SNGL FETUS: CPT

## 2025-07-14 ENCOUNTER — APPOINTMENT (OUTPATIENT)
Dept: CARDIOLOGY | Facility: CLINIC | Age: 40
End: 2025-07-14
Payer: COMMERCIAL

## 2025-07-14 VITALS
OXYGEN SATURATION: 98 % | WEIGHT: 264.25 LBS | HEIGHT: 65 IN | DIASTOLIC BLOOD PRESSURE: 74 MMHG | BODY MASS INDEX: 44.03 KG/M2 | SYSTOLIC BLOOD PRESSURE: 113 MMHG | HEART RATE: 99 BPM | TEMPERATURE: 98.3 F | RESPIRATION RATE: 16 BRPM

## 2025-07-14 PROCEDURE — 93000 ELECTROCARDIOGRAM COMPLETE: CPT

## 2025-07-14 PROCEDURE — 99213 OFFICE O/P EST LOW 20 MIN: CPT

## 2025-07-14 PROCEDURE — G2211 COMPLEX E/M VISIT ADD ON: CPT | Mod: NC

## 2025-07-29 ENCOUNTER — RESULT REVIEW (OUTPATIENT)
Age: 40
End: 2025-07-29

## 2025-07-29 ENCOUNTER — APPOINTMENT (OUTPATIENT)
Dept: CV DIAGNOSITCS | Facility: HOSPITAL | Age: 40
End: 2025-07-29

## 2025-07-29 ENCOUNTER — OUTPATIENT (OUTPATIENT)
Dept: OUTPATIENT SERVICES | Facility: HOSPITAL | Age: 40
LOS: 1 days | End: 2025-07-29
Payer: COMMERCIAL

## 2025-07-29 DIAGNOSIS — O10.919 UNSPECIFIED PRE-EXISTING HYPERTENSION COMPLICATING PREGNANCY, UNSPECIFIED TRIMESTER: ICD-10-CM

## 2025-07-29 DIAGNOSIS — Z98.84 BARIATRIC SURGERY STATUS: Chronic | ICD-10-CM

## 2025-07-29 DIAGNOSIS — Z98.890 OTHER SPECIFIED POSTPROCEDURAL STATES: Chronic | ICD-10-CM

## 2025-07-29 PROCEDURE — 76376 3D RENDER W/INTRP POSTPROCES: CPT | Mod: 26

## 2025-07-29 PROCEDURE — 93306 TTE W/DOPPLER COMPLETE: CPT | Mod: 26

## 2025-08-26 ENCOUNTER — NON-APPOINTMENT (OUTPATIENT)
Age: 40
End: 2025-08-26

## 2025-09-04 ENCOUNTER — ASOB RESULT (OUTPATIENT)
Age: 40
End: 2025-09-04

## 2025-09-04 ENCOUNTER — APPOINTMENT (OUTPATIENT)
Dept: ANTEPARTUM | Facility: CLINIC | Age: 40
End: 2025-09-04
Payer: COMMERCIAL

## 2025-09-04 PROCEDURE — 76816 OB US FOLLOW-UP PER FETUS: CPT

## 2025-09-04 PROCEDURE — 76819 FETAL BIOPHYS PROFIL W/O NST: CPT

## 2025-09-16 ENCOUNTER — ASOB RESULT (OUTPATIENT)
Age: 40
End: 2025-09-16

## 2025-09-16 ENCOUNTER — APPOINTMENT (OUTPATIENT)
Dept: MATERNAL FETAL MEDICINE | Facility: CLINIC | Age: 40
End: 2025-09-16